# Patient Record
Sex: FEMALE | Race: WHITE | Employment: UNEMPLOYED
[De-identification: names, ages, dates, MRNs, and addresses within clinical notes are randomized per-mention and may not be internally consistent; named-entity substitution may affect disease eponyms.]

---

## 2017-01-11 ENCOUNTER — TELEPHONE (OUTPATIENT)
Dept: FAMILY MEDICINE CLINIC | Facility: CLINIC | Age: 57
End: 2017-01-11

## 2017-01-11 DIAGNOSIS — R74.01 ELEVATED TRANSAMINASE LEVEL: ICD-10-CM

## 2017-01-11 DIAGNOSIS — E11.22 CONTROLLED TYPE 2 DIABETES MELLITUS WITH STAGE 3 CHRONIC KIDNEY DISEASE, WITHOUT LONG-TERM CURRENT USE OF INSULIN (HCC): Primary | ICD-10-CM

## 2017-01-11 DIAGNOSIS — N18.30 CONTROLLED TYPE 2 DIABETES MELLITUS WITH STAGE 3 CHRONIC KIDNEY DISEASE, WITHOUT LONG-TERM CURRENT USE OF INSULIN (HCC): Primary | ICD-10-CM

## 2017-01-23 ENCOUNTER — OFFICE VISIT (OUTPATIENT)
Dept: FAMILY MEDICINE CLINIC | Facility: CLINIC | Age: 57
End: 2017-01-23

## 2017-01-23 VITALS
DIASTOLIC BLOOD PRESSURE: 82 MMHG | WEIGHT: 186 LBS | RESPIRATION RATE: 20 BRPM | BODY MASS INDEX: 32.96 KG/M2 | HEART RATE: 68 BPM | HEIGHT: 63 IN | SYSTOLIC BLOOD PRESSURE: 130 MMHG | TEMPERATURE: 97.6 F

## 2017-01-23 DIAGNOSIS — G62.9 PERIPHERAL POLYNEUROPATHY: ICD-10-CM

## 2017-01-23 DIAGNOSIS — I10 ESSENTIAL HYPERTENSION: ICD-10-CM

## 2017-01-23 DIAGNOSIS — E78.00 PURE HYPERCHOLESTEROLEMIA: ICD-10-CM

## 2017-01-23 DIAGNOSIS — E11.22 CONTROLLED TYPE 2 DIABETES MELLITUS WITH STAGE 3 CHRONIC KIDNEY DISEASE, WITHOUT LONG-TERM CURRENT USE OF INSULIN (HCC): Primary | ICD-10-CM

## 2017-01-23 DIAGNOSIS — F33.1 MODERATE EPISODE OF RECURRENT MAJOR DEPRESSIVE DISORDER (HCC): ICD-10-CM

## 2017-01-23 DIAGNOSIS — Z23 NEED FOR INFLUENZA VACCINATION: ICD-10-CM

## 2017-01-23 DIAGNOSIS — Z11.59 NEED FOR HEPATITIS C SCREENING TEST: ICD-10-CM

## 2017-01-23 DIAGNOSIS — N18.30 CKD (CHRONIC KIDNEY DISEASE) STAGE 3, GFR 30-59 ML/MIN (HCC): ICD-10-CM

## 2017-01-23 DIAGNOSIS — N18.30 CONTROLLED TYPE 2 DIABETES MELLITUS WITH STAGE 3 CHRONIC KIDNEY DISEASE, WITHOUT LONG-TERM CURRENT USE OF INSULIN (HCC): Primary | ICD-10-CM

## 2017-01-23 PROCEDURE — G8427 DOCREV CUR MEDS BY ELIG CLIN: HCPCS | Performed by: PEDIATRICS

## 2017-01-23 PROCEDURE — 3017F COLORECTAL CA SCREEN DOC REV: CPT | Performed by: PEDIATRICS

## 2017-01-23 PROCEDURE — 99214 OFFICE O/P EST MOD 30 MIN: CPT | Performed by: PEDIATRICS

## 2017-01-23 PROCEDURE — 90688 IIV4 VACCINE SPLT 0.5 ML IM: CPT | Performed by: PEDIATRICS

## 2017-01-23 PROCEDURE — 3014F SCREEN MAMMO DOC REV: CPT | Performed by: PEDIATRICS

## 2017-01-23 PROCEDURE — G8419 CALC BMI OUT NRM PARAM NOF/U: HCPCS | Performed by: PEDIATRICS

## 2017-01-23 PROCEDURE — 1036F TOBACCO NON-USER: CPT | Performed by: PEDIATRICS

## 2017-01-23 PROCEDURE — G8484 FLU IMMUNIZE NO ADMIN: HCPCS | Performed by: PEDIATRICS

## 2017-01-23 PROCEDURE — 90471 IMMUNIZATION ADMIN: CPT | Performed by: PEDIATRICS

## 2017-01-23 PROCEDURE — 3044F HG A1C LEVEL LT 7.0%: CPT | Performed by: PEDIATRICS

## 2017-01-23 ASSESSMENT — ENCOUNTER SYMPTOMS
EYES NEGATIVE: 1
BLURRED VISION: 0
VISUAL CHANGE: 0
CONSTIPATION: 0
BACK PAIN: 1
COUGH: 0
DIARRHEA: 0
WHEEZING: 0
SHORTNESS OF BREATH: 0
ORTHOPNEA: 0
BOWEL INCONTINENCE: 0

## 2017-03-08 ENCOUNTER — OFFICE VISIT (OUTPATIENT)
Dept: FAMILY MEDICINE CLINIC | Facility: CLINIC | Age: 57
End: 2017-03-08

## 2017-03-08 VITALS
TEMPERATURE: 100.9 F | HEART RATE: 74 BPM | DIASTOLIC BLOOD PRESSURE: 84 MMHG | RESPIRATION RATE: 17 BRPM | SYSTOLIC BLOOD PRESSURE: 110 MMHG | WEIGHT: 187.5 LBS | BODY MASS INDEX: 33.21 KG/M2

## 2017-03-08 DIAGNOSIS — R10.30 LOWER ABDOMINAL PAIN: Primary | ICD-10-CM

## 2017-03-08 DIAGNOSIS — R11.2 NON-INTRACTABLE VOMITING WITH NAUSEA, UNSPECIFIED VOMITING TYPE: ICD-10-CM

## 2017-03-08 DIAGNOSIS — R50.9 FEVER, UNSPECIFIED FEVER CAUSE: ICD-10-CM

## 2017-03-08 PROCEDURE — G8484 FLU IMMUNIZE NO ADMIN: HCPCS | Performed by: NURSE PRACTITIONER

## 2017-03-08 PROCEDURE — 3014F SCREEN MAMMO DOC REV: CPT | Performed by: NURSE PRACTITIONER

## 2017-03-08 PROCEDURE — 1036F TOBACCO NON-USER: CPT | Performed by: NURSE PRACTITIONER

## 2017-03-08 PROCEDURE — 99213 OFFICE O/P EST LOW 20 MIN: CPT | Performed by: NURSE PRACTITIONER

## 2017-03-08 PROCEDURE — 3017F COLORECTAL CA SCREEN DOC REV: CPT | Performed by: NURSE PRACTITIONER

## 2017-03-08 PROCEDURE — G8417 CALC BMI ABV UP PARAM F/U: HCPCS | Performed by: NURSE PRACTITIONER

## 2017-03-08 PROCEDURE — G8427 DOCREV CUR MEDS BY ELIG CLIN: HCPCS | Performed by: NURSE PRACTITIONER

## 2017-03-08 RX ORDER — ONDANSETRON 4 MG/1
4 TABLET, ORALLY DISINTEGRATING ORAL EVERY 8 HOURS PRN
Qty: 30 TABLET | Refills: 0 | Status: SHIPPED | OUTPATIENT
Start: 2017-03-08 | End: 2017-03-18

## 2017-03-08 ASSESSMENT — ENCOUNTER SYMPTOMS
SHORTNESS OF BREATH: 0
COUGH: 0
CONSTIPATION: 1
NAUSEA: 1
BELCHING: 0
DIARRHEA: 1
ABDOMINAL PAIN: 1
VOMITING: 1
RHINORRHEA: 0
SORE THROAT: 0

## 2017-03-09 ENCOUNTER — TELEPHONE (OUTPATIENT)
Dept: FAMILY MEDICINE CLINIC | Facility: CLINIC | Age: 57
End: 2017-03-09

## 2017-03-10 RX ORDER — CIPROFLOXACIN 500 MG/1
500 TABLET, FILM COATED ORAL 2 TIMES DAILY
Qty: 20 TABLET | Refills: 0 | Status: SHIPPED | OUTPATIENT
Start: 2017-03-10 | End: 2017-03-20

## 2017-03-14 RX ORDER — DULOXETIN HYDROCHLORIDE 30 MG/1
30 CAPSULE, DELAYED RELEASE ORAL DAILY
Qty: 90 CAPSULE | Refills: 0 | Status: SHIPPED | OUTPATIENT
Start: 2017-03-14 | End: 2017-06-14 | Stop reason: SDUPTHER

## 2017-04-13 RX ORDER — FENOFIBRATE 150 MG/1
150 CAPSULE ORAL DAILY
Qty: 90 CAPSULE | Refills: 1 | Status: SHIPPED | OUTPATIENT
Start: 2017-04-13 | End: 2017-09-25 | Stop reason: SDUPTHER

## 2017-04-25 ENCOUNTER — HOSPITAL ENCOUNTER (OUTPATIENT)
Age: 57
Setting detail: SPECIMEN
Discharge: HOME | End: 2017-04-25
Payer: COMMERCIAL

## 2017-04-25 DIAGNOSIS — Z11.59 NEED FOR HEPATITIS C SCREENING TEST: ICD-10-CM

## 2017-04-25 DIAGNOSIS — E11.22 CONTROLLED TYPE 2 DIABETES MELLITUS WITH STAGE 3 CHRONIC KIDNEY DISEASE, WITHOUT LONG-TERM CURRENT USE OF INSULIN (HCC): ICD-10-CM

## 2017-04-25 DIAGNOSIS — N18.30 CONTROLLED TYPE 2 DIABETES MELLITUS WITH STAGE 3 CHRONIC KIDNEY DISEASE, WITHOUT LONG-TERM CURRENT USE OF INSULIN (HCC): ICD-10-CM

## 2017-04-25 DIAGNOSIS — R74.01 ELEVATED TRANSAMINASE LEVEL: ICD-10-CM

## 2017-04-25 LAB
ALT SERPL-CCNC: 46 U/L (ref 5–33)
AST SERPL-CCNC: 23 U/L
ESTIMATED AVERAGE GLUCOSE: 137 MG/DL
HBA1C MFR BLD: 6.4 % (ref 4–6)
HEPATITIS C ANTIBODY: NONREACTIVE

## 2017-07-18 RX ORDER — LISINOPRIL 40 MG/1
40 TABLET ORAL DAILY
Qty: 90 TABLET | Refills: 0 | Status: SHIPPED | OUTPATIENT
Start: 2017-07-18 | End: 2017-10-17 | Stop reason: SDUPTHER

## 2017-07-24 ENCOUNTER — OFFICE VISIT (OUTPATIENT)
Dept: FAMILY MEDICINE CLINIC | Age: 57
End: 2017-07-24
Payer: COMMERCIAL

## 2017-07-24 ENCOUNTER — HOSPITAL ENCOUNTER (OUTPATIENT)
Age: 57
Setting detail: SPECIMEN
Discharge: HOME | End: 2017-07-24
Payer: COMMERCIAL

## 2017-07-24 VITALS
TEMPERATURE: 97.1 F | DIASTOLIC BLOOD PRESSURE: 86 MMHG | OXYGEN SATURATION: 95 % | HEART RATE: 70 BPM | SYSTOLIC BLOOD PRESSURE: 124 MMHG | RESPIRATION RATE: 20 BRPM | WEIGHT: 182 LBS | BODY MASS INDEX: 32.25 KG/M2 | HEIGHT: 63 IN

## 2017-07-24 DIAGNOSIS — E11.22 CONTROLLED TYPE 2 DIABETES MELLITUS WITH STAGE 3 CHRONIC KIDNEY DISEASE, WITHOUT LONG-TERM CURRENT USE OF INSULIN (HCC): Primary | ICD-10-CM

## 2017-07-24 DIAGNOSIS — Z13.29 SCREENING FOR THYROID DISORDER: ICD-10-CM

## 2017-07-24 DIAGNOSIS — M51.16 LUMBAR DISC HERNIATION WITH RADICULOPATHY: ICD-10-CM

## 2017-07-24 DIAGNOSIS — E78.00 PURE HYPERCHOLESTEROLEMIA: ICD-10-CM

## 2017-07-24 DIAGNOSIS — G47.33 OSA (OBSTRUCTIVE SLEEP APNEA): ICD-10-CM

## 2017-07-24 DIAGNOSIS — I10 ESSENTIAL HYPERTENSION: ICD-10-CM

## 2017-07-24 DIAGNOSIS — E11.22 CONTROLLED TYPE 2 DIABETES MELLITUS WITH STAGE 3 CHRONIC KIDNEY DISEASE, WITHOUT LONG-TERM CURRENT USE OF INSULIN (HCC): ICD-10-CM

## 2017-07-24 DIAGNOSIS — L23.9 ALLERGIC CONTACT DERMATITIS, UNSPECIFIED TRIGGER: ICD-10-CM

## 2017-07-24 DIAGNOSIS — F33.1 MODERATE EPISODE OF RECURRENT MAJOR DEPRESSIVE DISORDER (HCC): ICD-10-CM

## 2017-07-24 DIAGNOSIS — N18.30 CONTROLLED TYPE 2 DIABETES MELLITUS WITH STAGE 3 CHRONIC KIDNEY DISEASE, WITHOUT LONG-TERM CURRENT USE OF INSULIN (HCC): ICD-10-CM

## 2017-07-24 DIAGNOSIS — N18.30 CONTROLLED TYPE 2 DIABETES MELLITUS WITH STAGE 3 CHRONIC KIDNEY DISEASE, WITHOUT LONG-TERM CURRENT USE OF INSULIN (HCC): Primary | ICD-10-CM

## 2017-07-24 LAB
ABSOLUTE EOS #: 0.1 K/UL (ref 0–0.4)
ABSOLUTE LYMPH #: 2.5 K/UL (ref 1–4.8)
ABSOLUTE MONO #: 0.4 K/UL (ref 0.1–1.2)
ALBUMIN SERPL-MCNC: 4.6 G/DL (ref 3.5–5.2)
ALBUMIN/GLOBULIN RATIO: 2.1 (ref 1–2.5)
ALP BLD-CCNC: 94 U/L (ref 35–104)
ALT SERPL-CCNC: 35 U/L (ref 5–33)
ANION GAP SERPL CALCULATED.3IONS-SCNC: 19 MMOL/L (ref 9–17)
AST SERPL-CCNC: 23 U/L
BASOPHILS # BLD: 0 %
BASOPHILS ABSOLUTE: 0 K/UL (ref 0–0.2)
BILIRUB SERPL-MCNC: 0.52 MG/DL (ref 0.3–1.2)
BUN BLDV-MCNC: 22 MG/DL (ref 6–20)
BUN/CREAT BLD: ABNORMAL (ref 9–20)
CALCIUM SERPL-MCNC: 9.9 MG/DL (ref 8.6–10.4)
CHLORIDE BLD-SCNC: 104 MMOL/L (ref 98–107)
CHOLESTEROL/HDL RATIO: 4.3
CHOLESTEROL: 192 MG/DL
CO2: 26 MMOL/L (ref 20–31)
CREAT SERPL-MCNC: 0.89 MG/DL (ref 0.5–0.9)
DIFFERENTIAL TYPE: NORMAL
EOSINOPHILS RELATIVE PERCENT: 2 %
ESTIMATED AVERAGE GLUCOSE: 140 MG/DL
GFR AFRICAN AMERICAN: >60 ML/MIN
GFR NON-AFRICAN AMERICAN: >60 ML/MIN
GFR SERPL CREATININE-BSD FRML MDRD: ABNORMAL ML/MIN/{1.73_M2}
GFR SERPL CREATININE-BSD FRML MDRD: ABNORMAL ML/MIN/{1.73_M2}
GLUCOSE BLD-MCNC: 126 MG/DL (ref 70–99)
HBA1C MFR BLD: 6.5 % (ref 4–6)
HCT VFR BLD CALC: 41.2 % (ref 36–46)
HDLC SERPL-MCNC: 45 MG/DL
HEMOGLOBIN: 13.9 G/DL (ref 12–16)
LDL CHOLESTEROL: 106 MG/DL (ref 0–130)
LYMPHOCYTES # BLD: 31 %
MCH RBC QN AUTO: 29.1 PG (ref 26–34)
MCHC RBC AUTO-ENTMCNC: 33.8 G/DL (ref 31–37)
MCV RBC AUTO: 86.2 FL (ref 80–100)
MONOCYTES # BLD: 5 %
PDW BLD-RTO: 13.7 % (ref 12.5–15.4)
PLATELET # BLD: 284 K/UL (ref 140–450)
PLATELET ESTIMATE: NORMAL
PMV BLD AUTO: 9.6 FL (ref 6–12)
POTASSIUM SERPL-SCNC: 4.7 MMOL/L (ref 3.7–5.3)
RBC # BLD: 4.79 M/UL (ref 4–5.2)
RBC # BLD: NORMAL 10*6/UL
SEG NEUTROPHILS: 62 %
SEGMENTED NEUTROPHILS ABSOLUTE COUNT: 5 K/UL (ref 1.8–7.7)
SODIUM BLD-SCNC: 149 MMOL/L (ref 135–144)
TOTAL PROTEIN: 6.8 G/DL (ref 6.4–8.3)
TRIGL SERPL-MCNC: 203 MG/DL
TSH SERPL DL<=0.05 MIU/L-ACNC: 2.04 MIU/L (ref 0.3–5)
VLDLC SERPL CALC-MCNC: ABNORMAL MG/DL (ref 1–30)
WBC # BLD: 8.2 K/UL (ref 3.5–11)
WBC # BLD: NORMAL 10*3/UL

## 2017-07-24 PROCEDURE — 3046F HEMOGLOBIN A1C LEVEL >9.0%: CPT | Performed by: NURSE PRACTITIONER

## 2017-07-24 PROCEDURE — G8417 CALC BMI ABV UP PARAM F/U: HCPCS | Performed by: NURSE PRACTITIONER

## 2017-07-24 PROCEDURE — 99214 OFFICE O/P EST MOD 30 MIN: CPT | Performed by: NURSE PRACTITIONER

## 2017-07-24 PROCEDURE — 3017F COLORECTAL CA SCREEN DOC REV: CPT | Performed by: NURSE PRACTITIONER

## 2017-07-24 PROCEDURE — G8427 DOCREV CUR MEDS BY ELIG CLIN: HCPCS | Performed by: NURSE PRACTITIONER

## 2017-07-24 PROCEDURE — 1036F TOBACCO NON-USER: CPT | Performed by: NURSE PRACTITIONER

## 2017-07-24 PROCEDURE — 3014F SCREEN MAMMO DOC REV: CPT | Performed by: NURSE PRACTITIONER

## 2017-07-24 RX ORDER — LORATADINE 10 MG/1
10 CAPSULE, LIQUID FILLED ORAL DAILY
COMMUNITY
End: 2017-07-24

## 2017-07-24 RX ORDER — CETIRIZINE HYDROCHLORIDE 10 MG/1
10 TABLET ORAL DAILY
COMMUNITY
Start: 2017-07-24 | End: 2018-07-24

## 2017-07-24 ASSESSMENT — ENCOUNTER SYMPTOMS
SHORTNESS OF BREATH: 0
VOMITING: 0
NAUSEA: 1
ABDOMINAL PAIN: 0
RHINORRHEA: 0
BACK PAIN: 1
EYE ITCHING: 1
WHEEZING: 0
CONSTIPATION: 0
DIARRHEA: 0
EYE PAIN: 0
TROUBLE SWALLOWING: 0
COUGH: 0
SORE THROAT: 0
EYE DISCHARGE: 1

## 2017-07-26 ENCOUNTER — TELEPHONE (OUTPATIENT)
Dept: FAMILY MEDICINE CLINIC | Age: 57
End: 2017-07-26

## 2017-07-26 RX ORDER — PREDNISONE 20 MG/1
40 TABLET ORAL DAILY
Qty: 10 TABLET | Refills: 0 | Status: SHIPPED | OUTPATIENT
Start: 2017-07-26 | End: 2017-07-31

## 2017-09-13 RX ORDER — DULOXETIN HYDROCHLORIDE 30 MG/1
30 CAPSULE, DELAYED RELEASE ORAL DAILY
Qty: 90 CAPSULE | Refills: 1 | Status: SHIPPED | OUTPATIENT
Start: 2017-09-13 | End: 2018-03-12 | Stop reason: SDUPTHER

## 2017-09-25 RX ORDER — FENOFIBRATE 150 MG/1
150 CAPSULE ORAL DAILY
Qty: 90 CAPSULE | Refills: 1 | Status: SHIPPED | OUTPATIENT
Start: 2017-09-25 | End: 2018-04-26 | Stop reason: ALTCHOICE

## 2017-10-19 RX ORDER — LISINOPRIL 40 MG/1
40 TABLET ORAL DAILY
Qty: 90 TABLET | Refills: 1 | Status: SHIPPED | OUTPATIENT
Start: 2017-10-19 | End: 2018-04-19 | Stop reason: SDUPTHER

## 2017-11-07 RX ORDER — OMEPRAZOLE 20 MG/1
20 CAPSULE, DELAYED RELEASE ORAL DAILY
Qty: 90 CAPSULE | Refills: 2 | Status: SHIPPED | OUTPATIENT
Start: 2017-11-07 | End: 2018-08-06 | Stop reason: SDUPTHER

## 2018-01-08 DIAGNOSIS — E78.00 PURE HYPERCHOLESTEROLEMIA: ICD-10-CM

## 2018-01-09 RX ORDER — ATORVASTATIN CALCIUM 40 MG/1
40 TABLET, FILM COATED ORAL DAILY
Qty: 90 TABLET | Refills: 2 | Status: SHIPPED | OUTPATIENT
Start: 2018-01-09 | End: 2018-04-26 | Stop reason: SDUPTHER

## 2018-03-13 RX ORDER — DULOXETIN HYDROCHLORIDE 30 MG/1
30 CAPSULE, DELAYED RELEASE ORAL DAILY
Qty: 90 CAPSULE | Refills: 1 | Status: SHIPPED | OUTPATIENT
Start: 2018-03-13 | End: 2018-05-22 | Stop reason: DRUGHIGH

## 2018-04-20 RX ORDER — LISINOPRIL 40 MG/1
40 TABLET ORAL DAILY
Qty: 90 TABLET | Refills: 0 | Status: SHIPPED | OUTPATIENT
Start: 2018-04-20 | End: 2018-07-22 | Stop reason: SDUPTHER

## 2018-04-23 ENCOUNTER — OFFICE VISIT (OUTPATIENT)
Dept: FAMILY MEDICINE CLINIC | Age: 58
End: 2018-04-23
Payer: COMMERCIAL

## 2018-04-23 VITALS
HEART RATE: 68 BPM | BODY MASS INDEX: 32.85 KG/M2 | RESPIRATION RATE: 16 BRPM | SYSTOLIC BLOOD PRESSURE: 128 MMHG | OXYGEN SATURATION: 98 % | WEIGHT: 185.4 LBS | DIASTOLIC BLOOD PRESSURE: 78 MMHG | HEIGHT: 63 IN

## 2018-04-23 DIAGNOSIS — E11.22 CONTROLLED TYPE 2 DIABETES MELLITUS WITH STAGE 3 CHRONIC KIDNEY DISEASE, WITHOUT LONG-TERM CURRENT USE OF INSULIN (HCC): Primary | ICD-10-CM

## 2018-04-23 DIAGNOSIS — F33.1 MODERATE EPISODE OF RECURRENT MAJOR DEPRESSIVE DISORDER (HCC): ICD-10-CM

## 2018-04-23 DIAGNOSIS — I10 ESSENTIAL HYPERTENSION: ICD-10-CM

## 2018-04-23 DIAGNOSIS — N18.30 CONTROLLED TYPE 2 DIABETES MELLITUS WITH STAGE 3 CHRONIC KIDNEY DISEASE, WITHOUT LONG-TERM CURRENT USE OF INSULIN (HCC): Primary | ICD-10-CM

## 2018-04-23 DIAGNOSIS — Z13.31 POSITIVE DEPRESSION SCREENING: ICD-10-CM

## 2018-04-23 DIAGNOSIS — E78.00 PURE HYPERCHOLESTEROLEMIA: ICD-10-CM

## 2018-04-23 DIAGNOSIS — G62.9 PERIPHERAL POLYNEUROPATHY: ICD-10-CM

## 2018-04-23 DIAGNOSIS — M51.16 LUMBAR DISC HERNIATION WITH RADICULOPATHY: ICD-10-CM

## 2018-04-23 PROCEDURE — G8417 CALC BMI ABV UP PARAM F/U: HCPCS | Performed by: PEDIATRICS

## 2018-04-23 PROCEDURE — G0444 DEPRESSION SCREEN ANNUAL: HCPCS | Performed by: PEDIATRICS

## 2018-04-23 PROCEDURE — G8431 POS CLIN DEPRES SCRN F/U DOC: HCPCS | Performed by: PEDIATRICS

## 2018-04-23 PROCEDURE — 3017F COLORECTAL CA SCREEN DOC REV: CPT | Performed by: PEDIATRICS

## 2018-04-23 PROCEDURE — 3046F HEMOGLOBIN A1C LEVEL >9.0%: CPT | Performed by: PEDIATRICS

## 2018-04-23 PROCEDURE — 2022F DILAT RTA XM EVC RTNOPTHY: CPT | Performed by: PEDIATRICS

## 2018-04-23 PROCEDURE — 99214 OFFICE O/P EST MOD 30 MIN: CPT | Performed by: PEDIATRICS

## 2018-04-23 PROCEDURE — 3014F SCREEN MAMMO DOC REV: CPT | Performed by: PEDIATRICS

## 2018-04-23 PROCEDURE — G8427 DOCREV CUR MEDS BY ELIG CLIN: HCPCS | Performed by: PEDIATRICS

## 2018-04-23 PROCEDURE — 1036F TOBACCO NON-USER: CPT | Performed by: PEDIATRICS

## 2018-04-23 RX ORDER — NAPROXEN 500 MG/1
1 TABLET ORAL DAILY PRN
COMMUNITY

## 2018-04-23 ASSESSMENT — PATIENT HEALTH QUESTIONNAIRE - PHQ9
SUM OF ALL RESPONSES TO PHQ QUESTIONS 1-9: 12
7. TROUBLE CONCENTRATING ON THINGS, SUCH AS READING THE NEWSPAPER OR WATCHING TELEVISION: 0
1. LITTLE INTEREST OR PLEASURE IN DOING THINGS: 3
5. POOR APPETITE OR OVEREATING: 3
4. FEELING TIRED OR HAVING LITTLE ENERGY: 3
3. TROUBLE FALLING OR STAYING ASLEEP: 0
2. FEELING DOWN, DEPRESSED OR HOPELESS: 3
9. THOUGHTS THAT YOU WOULD BE BETTER OFF DEAD, OR OF HURTING YOURSELF: 0
10. IF YOU CHECKED OFF ANY PROBLEMS, HOW DIFFICULT HAVE THESE PROBLEMS MADE IT FOR YOU TO DO YOUR WORK, TAKE CARE OF THINGS AT HOME, OR GET ALONG WITH OTHER PEOPLE: 2
8. MOVING OR SPEAKING SO SLOWLY THAT OTHER PEOPLE COULD HAVE NOTICED. OR THE OPPOSITE, BEING SO FIGETY OR RESTLESS THAT YOU HAVE BEEN MOVING AROUND A LOT MORE THAN USUAL: 0
SUM OF ALL RESPONSES TO PHQ9 QUESTIONS 1 & 2: 6
6. FEELING BAD ABOUT YOURSELF - OR THAT YOU ARE A FAILURE OR HAVE LET YOURSELF OR YOUR FAMILY DOWN: 0

## 2018-04-23 ASSESSMENT — ENCOUNTER SYMPTOMS
GASTROINTESTINAL NEGATIVE: 1
BACK PAIN: 1
EYES NEGATIVE: 1
RESPIRATORY NEGATIVE: 1

## 2018-04-24 ENCOUNTER — HOSPITAL ENCOUNTER (OUTPATIENT)
Age: 58
Setting detail: SPECIMEN
Discharge: HOME | End: 2018-04-24
Payer: COMMERCIAL

## 2018-04-24 DIAGNOSIS — E78.00 PURE HYPERCHOLESTEROLEMIA: ICD-10-CM

## 2018-04-24 DIAGNOSIS — N18.30 CONTROLLED TYPE 2 DIABETES MELLITUS WITH STAGE 3 CHRONIC KIDNEY DISEASE, WITHOUT LONG-TERM CURRENT USE OF INSULIN (HCC): ICD-10-CM

## 2018-04-24 DIAGNOSIS — I10 ESSENTIAL HYPERTENSION: ICD-10-CM

## 2018-04-24 DIAGNOSIS — E11.22 CONTROLLED TYPE 2 DIABETES MELLITUS WITH STAGE 3 CHRONIC KIDNEY DISEASE, WITHOUT LONG-TERM CURRENT USE OF INSULIN (HCC): ICD-10-CM

## 2018-04-24 LAB
ALBUMIN SERPL-MCNC: 4.4 G/DL (ref 3.5–5.2)
ALBUMIN/GLOBULIN RATIO: 1.9 (ref 1–2.5)
ALP BLD-CCNC: 93 U/L (ref 35–104)
ALT SERPL-CCNC: 33 U/L (ref 5–33)
ANION GAP SERPL CALCULATED.3IONS-SCNC: 15 MMOL/L (ref 9–17)
AST SERPL-CCNC: 21 U/L
BILIRUB SERPL-MCNC: 0.5 MG/DL (ref 0.3–1.2)
BUN BLDV-MCNC: 14 MG/DL (ref 6–20)
BUN/CREAT BLD: ABNORMAL (ref 9–20)
CALCIUM SERPL-MCNC: 9.4 MG/DL (ref 8.6–10.4)
CHLORIDE BLD-SCNC: 103 MMOL/L (ref 98–107)
CHOLESTEROL/HDL RATIO: 3.5
CHOLESTEROL: 189 MG/DL
CO2: 24 MMOL/L (ref 20–31)
CREAT SERPL-MCNC: 0.89 MG/DL (ref 0.5–0.9)
CREATININE URINE: 276.7 MG/DL (ref 28–217)
GFR AFRICAN AMERICAN: >60 ML/MIN
GFR NON-AFRICAN AMERICAN: >60 ML/MIN
GFR SERPL CREATININE-BSD FRML MDRD: ABNORMAL ML/MIN/{1.73_M2}
GFR SERPL CREATININE-BSD FRML MDRD: ABNORMAL ML/MIN/{1.73_M2}
GLUCOSE BLD-MCNC: 127 MG/DL (ref 70–99)
HDLC SERPL-MCNC: 54 MG/DL
LDL CHOLESTEROL: 116 MG/DL (ref 0–130)
MICROALBUMIN/CREAT 24H UR: 15 MG/L
MICROALBUMIN/CREAT UR-RTO: 5 MCG/MG CREAT
POTASSIUM SERPL-SCNC: 4.1 MMOL/L (ref 3.7–5.3)
SODIUM BLD-SCNC: 142 MMOL/L (ref 135–144)
TOTAL PROTEIN: 6.7 G/DL (ref 6.4–8.3)
TRIGL SERPL-MCNC: 96 MG/DL
VLDLC SERPL CALC-MCNC: NORMAL MG/DL (ref 1–30)

## 2018-04-25 LAB
ESTIMATED AVERAGE GLUCOSE: 134 MG/DL
HBA1C MFR BLD: 6.3 % (ref 4–6)

## 2018-04-26 DIAGNOSIS — E78.00 PURE HYPERCHOLESTEROLEMIA: Primary | ICD-10-CM

## 2018-04-26 DIAGNOSIS — N18.30 CONTROLLED TYPE 2 DIABETES MELLITUS WITH STAGE 3 CHRONIC KIDNEY DISEASE, WITHOUT LONG-TERM CURRENT USE OF INSULIN (HCC): ICD-10-CM

## 2018-04-26 DIAGNOSIS — I10 ESSENTIAL HYPERTENSION: ICD-10-CM

## 2018-04-26 DIAGNOSIS — E78.00 PURE HYPERCHOLESTEROLEMIA: ICD-10-CM

## 2018-04-26 DIAGNOSIS — E11.22 CONTROLLED TYPE 2 DIABETES MELLITUS WITH STAGE 3 CHRONIC KIDNEY DISEASE, WITHOUT LONG-TERM CURRENT USE OF INSULIN (HCC): ICD-10-CM

## 2018-04-27 RX ORDER — ATORVASTATIN CALCIUM 80 MG/1
80 TABLET, FILM COATED ORAL DAILY
Qty: 90 TABLET | Refills: 1 | Status: SHIPPED | OUTPATIENT
Start: 2018-04-27 | End: 2020-01-08 | Stop reason: SDUPTHER

## 2018-05-03 ENCOUNTER — TELEPHONE (OUTPATIENT)
Dept: PSYCHOLOGY | Facility: CLINIC | Age: 58
End: 2018-05-03

## 2018-05-22 ENCOUNTER — TELEPHONE (OUTPATIENT)
Dept: FAMILY MEDICINE CLINIC | Age: 58
End: 2018-05-22

## 2018-05-22 RX ORDER — DULOXETIN HYDROCHLORIDE 30 MG/1
60 CAPSULE, DELAYED RELEASE ORAL DAILY
Qty: 180 CAPSULE | Refills: 1 | Status: CANCELLED | OUTPATIENT
Start: 2018-05-22 | End: 2019-05-22

## 2018-05-22 RX ORDER — DULOXETIN HYDROCHLORIDE 60 MG/1
60 CAPSULE, DELAYED RELEASE ORAL DAILY
Qty: 90 CAPSULE | Refills: 1 | Status: SHIPPED | OUTPATIENT
Start: 2018-05-22 | End: 2018-10-30 | Stop reason: SDUPTHER

## 2018-07-23 RX ORDER — LISINOPRIL 40 MG/1
40 TABLET ORAL DAILY
Qty: 90 TABLET | Refills: 1 | Status: SHIPPED | OUTPATIENT
Start: 2018-07-23 | End: 2019-01-19 | Stop reason: SDUPTHER

## 2018-07-23 NOTE — TELEPHONE ENCOUNTER
LR 4/20/18  LOV 4/23/18  RTO 6 months    Health Maintenance   Topic Date Due    HIV screen  06/25/1973    Shingles Vaccine (1 of 2 - 2 Dose Series) 06/25/2008    Flu vaccine (1) 09/01/2018    Diabetic retinal exam  04/09/2019    Diabetic foot exam  04/23/2019    A1C test (Diabetic or Prediabetic)  04/24/2019    Lipid screen  04/24/2019    Potassium monitoring  04/24/2019    Creatinine monitoring  04/24/2019    Breast cancer screen  07/10/2019    DTaP/Tdap/Td vaccine (2 - Td) 09/24/2020    Colon cancer screen colonoscopy  02/27/2023    Pneumococcal med risk  Completed    Hepatitis C screen  Completed             (applicable per patient's age: Cancer Screenings, Depression Screening, Fall Risk Screening, Immunizations)    Hemoglobin A1C (%)   Date Value   04/24/2018 6.3 (H)   07/24/2017 6.5 (H)   04/25/2017 6.4 (H)     Microalb/Crt.  Ratio (mcg/mg creat)   Date Value   04/24/2018 5     LDL Cholesterol (mg/dL)   Date Value   04/24/2018 116     AST (U/L)   Date Value   04/24/2018 21     ALT (U/L)   Date Value   04/24/2018 33     BUN (mg/dL)   Date Value   04/24/2018 14      (goal A1C is < 7)   (goal LDL is <100) need 30-50% reduction from baseline     BP Readings from Last 3 Encounters:   04/23/18 128/78   07/24/17 124/86   03/08/17 110/84    (goal /80)      All Future Testing planned in [facility]:  Lab Frequency Next Occurrence   Hemoglobin A1C Once 10/22/2018   Comprehensive Metabolic Panel Once 92/27/2635   Lipid Panel Once 10/22/2018       Next Visit Date:  Future Appointments  Date Time Provider Jazmin Chan   10/30/2018 8:40 AM [facility]            Patient Active Problem List:     Diabetes mellitus 2 with renal manifestations, controlled     Lumbar disc herniation with radiculopathy     Hyperlipidemia     Hypertension     Major depression     CKD (chronic kidney disease) stage 3, GFR 30-59 ml/min     Peripheral neuropathy (HCC)     ARNIE (obstructive sleep apnea)

## 2018-08-06 NOTE — TELEPHONE ENCOUNTER
LOV:4-23-18  HCV:77-96-00  YCD:70-3-18  Health Maintenance   Topic Date Due    HIV screen  06/25/1973    Shingles Vaccine (1 of 2 - 2 Dose Series) 06/25/2008    Flu vaccine (1) 09/01/2018    Diabetic retinal exam  04/09/2019    Diabetic foot exam  04/23/2019    A1C test (Diabetic or Prediabetic)  04/24/2019    Lipid screen  04/24/2019    Potassium monitoring  04/24/2019    Creatinine monitoring  04/24/2019    Breast cancer screen  07/10/2019    DTaP/Tdap/Td vaccine (2 - Td) 09/24/2020    Colon cancer screen colonoscopy  02/27/2023    Pneumococcal med risk  Completed    Hepatitis C screen  Completed             (applicable per patient's age: Cancer Screenings, Depression Screening, Fall Risk Screening, Immunizations)    Hemoglobin A1C (%)   Date Value   04/24/2018 6.3 (H)   07/24/2017 6.5 (H)   04/25/2017 6.4 (H)     Microalb/Crt.  Ratio (mcg/mg creat)   Date Value   04/24/2018 5     LDL Cholesterol (mg/dL)   Date Value   04/24/2018 116     AST (U/L)   Date Value   04/24/2018 21     ALT (U/L)   Date Value   04/24/2018 33     BUN (mg/dL)   Date Value   04/24/2018 14      (goal A1C is < 7)   (goal LDL is <100) need 30-50% reduction from baseline     BP Readings from Last 3 Encounters:   04/23/18 128/78   07/24/17 124/86   03/08/17 110/84    (goal /80)      All Future Testing planned in [facility]:  Lab Frequency Next Occurrence   Hemoglobin A1C Once 10/22/2018   Comprehensive Metabolic Panel Once 14/75/4202   Lipid Panel Once 10/22/2018       Next Visit Date:  Future Appointments  Date Time Provider Jazmin Chan   10/30/2018 8:40 AM MD Glenna Hamlin Vern [facility] [address] [facility]            Patient Active Problem List:     Diabetes mellitus 2 with renal manifestations, controlled     Lumbar disc herniation with radiculopathy     Hyperlipidemia     Hypertension     Major depression     CKD (chronic kidney disease) stage 3, GFR 30-59 ml/min     Peripheral neuropathy     ARNIE (obstructive sleep apnea)     TMJ

## 2018-08-07 RX ORDER — OMEPRAZOLE 20 MG/1
20 CAPSULE, DELAYED RELEASE ORAL DAILY
Qty: 90 CAPSULE | Refills: 1 | Status: SHIPPED | OUTPATIENT
Start: 2018-08-07 | End: 2019-02-03 | Stop reason: SDUPTHER

## 2018-08-13 LAB
BASOPHILS ABSOLUTE: 0.1 /ΜL
BASOPHILS RELATIVE PERCENT: 1 %
BILIRUBIN, URINE: NEGATIVE
BLOOD, URINE: NEGATIVE
CLARITY: CLEAR
COLOR: YELLOW
EOSINOPHILS ABSOLUTE: 0.1 /ΜL
EOSINOPHILS RELATIVE PERCENT: 1 %
GLUCOSE URINE: NEGATIVE
HCT VFR BLD CALC: 39.1 % (ref 36–46)
HEMOGLOBIN: 13.5 G/DL (ref 12–16)
KETONES, URINE: NEGATIVE
LEUKOCYTE ESTERASE, URINE: POSITIVE
LYMPHOCYTES ABSOLUTE: 3 /ΜL
LYMPHOCYTES RELATIVE PERCENT: 37 %
MCH RBC QN AUTO: 30 PG
MCHC RBC AUTO-ENTMCNC: 34.6 G/DL
MCV RBC AUTO: 87 FL
MONOCYTES ABSOLUTE: 0.5 /ΜL
MONOCYTES RELATIVE PERCENT: 6 %
NEUTROPHILS ABSOLUTE: 4.4 /ΜL
NEUTROPHILS RELATIVE PERCENT: 54 %
NITRITE, URINE: NEGATIVE
PH UA: 7.5 (ref 4.5–8)
PLATELET # BLD: 284 K/ΜL
PMV BLD AUTO: 8.8 FL
PROTEIN UA: POSITIVE
RBC # BLD: 4.51 10^6/ΜL
SPECIFIC GRAVITY, URINE: 1.01
UROBILINOGEN, URINE: NORMAL
WBC # BLD: 8.2 10^3/ML

## 2018-08-17 ENCOUNTER — OFFICE VISIT (OUTPATIENT)
Dept: FAMILY MEDICINE CLINIC | Age: 58
End: 2018-08-17
Payer: COMMERCIAL

## 2018-08-17 VITALS
BODY MASS INDEX: 32.5 KG/M2 | RESPIRATION RATE: 18 BRPM | HEART RATE: 72 BPM | DIASTOLIC BLOOD PRESSURE: 88 MMHG | WEIGHT: 183.4 LBS | TEMPERATURE: 96.2 F | SYSTOLIC BLOOD PRESSURE: 134 MMHG

## 2018-08-17 DIAGNOSIS — M54.42 CHRONIC LEFT-SIDED LOW BACK PAIN WITH LEFT-SIDED SCIATICA: ICD-10-CM

## 2018-08-17 DIAGNOSIS — N39.0 URINARY TRACT INFECTION WITHOUT HEMATURIA, SITE UNSPECIFIED: ICD-10-CM

## 2018-08-17 DIAGNOSIS — G89.29 CHRONIC LEFT-SIDED LOW BACK PAIN WITH LEFT-SIDED SCIATICA: ICD-10-CM

## 2018-08-17 DIAGNOSIS — M51.16 LUMBAR DISC HERNIATION WITH RADICULOPATHY: Primary | ICD-10-CM

## 2018-08-17 PROCEDURE — 3014F SCREEN MAMMO DOC REV: CPT | Performed by: NURSE PRACTITIONER

## 2018-08-17 PROCEDURE — 1036F TOBACCO NON-USER: CPT | Performed by: NURSE PRACTITIONER

## 2018-08-17 PROCEDURE — 99213 OFFICE O/P EST LOW 20 MIN: CPT | Performed by: NURSE PRACTITIONER

## 2018-08-17 PROCEDURE — 3017F COLORECTAL CA SCREEN DOC REV: CPT | Performed by: NURSE PRACTITIONER

## 2018-08-17 PROCEDURE — G8417 CALC BMI ABV UP PARAM F/U: HCPCS | Performed by: NURSE PRACTITIONER

## 2018-08-17 PROCEDURE — G8427 DOCREV CUR MEDS BY ELIG CLIN: HCPCS | Performed by: NURSE PRACTITIONER

## 2018-08-17 RX ORDER — METHYLPREDNISOLONE 4 MG/1
TABLET ORAL
COMMUNITY
Start: 2018-08-13 | End: 2018-08-24

## 2018-08-17 RX ORDER — CIPROFLOXACIN 500 MG/1
1 TABLET, FILM COATED ORAL 2 TIMES DAILY
COMMUNITY
Start: 2018-08-15 | End: 2018-08-24

## 2018-08-17 RX ORDER — OXYCODONE HYDROCHLORIDE AND ACETAMINOPHEN 5; 325 MG/1; MG/1
TABLET ORAL
COMMUNITY
Start: 2018-08-13 | End: 2018-08-31

## 2018-08-17 RX ORDER — CYCLOBENZAPRINE HCL 10 MG
1 TABLET ORAL DAILY
COMMUNITY
Start: 2018-08-13 | End: 2018-09-07

## 2018-08-17 ASSESSMENT — ENCOUNTER SYMPTOMS
DIARRHEA: 0
COUGH: 0
ABDOMINAL PAIN: 0
VOMITING: 0
SHORTNESS OF BREATH: 0
BACK PAIN: 1
NAUSEA: 1

## 2018-08-17 NOTE — PROGRESS NOTES
methylPREDNISolone (MEDROL DOSEPACK) 4 MG tablet       oxyCODONE-acetaminophen (PERCOCET) 5-325 MG per tablet       omeprazole (PRILOSEC) 20 MG delayed release capsule Take 1 capsule by mouth Daily 90 capsule 1    lisinopril (PRINIVIL;ZESTRIL) 40 MG tablet Take 1 tablet by mouth daily 90 tablet 1    metFORMIN (GLUCOPHAGE) 500 MG tablet Take 1 tablet by mouth daily (with breakfast) 90 tablet 1    DULoxetine (CYMBALTA) 60 MG extended release capsule Take 1 capsule by mouth daily 90 capsule 1    atorvastatin (LIPITOR) 80 MG tablet Take 1 tablet by mouth daily New dose as discussed 90 tablet 1    DOCUSATE CALCIUM PO Take 1 capsule by mouth daily as needed      naproxen (NAPROSYN) 500 MG tablet Take 1 tablet by mouth daily as needed      aspirin 81 MG EC tablet Take 81 mg by mouth daily. No current facility-administered medications for this visit. Patient ID: Saad Bush is a 59 y.o. female. Patient presents in office today for ER follow up. Went to [facility] ER on Monday due to extreme back pains. Tells me she has troubles with her back and usually on her left side. Can go down her left leg. Only thing that was helping her pain was laying down. Taking Aleve as needed for pain. Prescribed medrol dose pack and muscle relaxers to use as needed. Daughter finally convinced her to go to ER. Discharged home. Dr. Karyle Lin called her next day and started her on Cipro 500 mg twice daily x7 days. Review of Systems   Constitutional: Positive for appetite change. Negative for fever. Respiratory: Negative for cough and shortness of breath. Cardiovascular: Negative for chest pain. Gastrointestinal: Positive for nausea (Sunday and Monday). Negative for abdominal pain, diarrhea and vomiting. Genitourinary: Positive for decreased urine volume, difficulty urinating, dysuria and urgency. Negative for hematuria. Musculoskeletal: Positive for arthralgias (left leg), back pain and gait problem. Has follow up with Dr. Sean Owen 9/12   Skin: Negative for rash. Neurological: Positive for numbness (left leg only) and headaches. Psychiatric/Behavioral: Positive for sleep disturbance. Objective:     /88 (Site: Right Arm, Position: Sitting, Cuff Size: Large Adult)   Pulse 72   Temp 96.2 °F (35.7 °C) (Tympanic)   Resp 18   Wt 183 lb 6.4 oz (83.2 kg)   BMI 32.50 kg/m²      Physical Exam   Constitutional: She is oriented to person, place, and time. She appears well-developed and well-nourished. No distress. Cardiovascular: Normal rate and regular rhythm. Pulmonary/Chest: Effort normal and breath sounds normal. No respiratory distress. She has no wheezes. Abdominal: Soft. She exhibits no distension. There is no tenderness. There is no rebound. Musculoskeletal: She exhibits no edema. Lumbar back: She exhibits decreased range of motion, tenderness and pain. She exhibits no swelling and no edema. Neurological: She is alert and oriented to person, place, and time. She has normal strength. No sensory deficit. Coordination and gait normal.   Skin: Skin is warm and dry. No rash noted. Psychiatric: She has a normal mood and affect. Nursing note and vitals reviewed. Assessment:      Diagnosis Orders   1. Lumbar disc herniation with radiculopathy     2. Chronic left-sided low back pain with left-sided sciatica     3. Urinary tract infection without hematuria, site unspecified         Plan:     Obtain and review ER chart  Obtain and review labs completed. Obtained urine culture results after her appt which was normal. Called patient and advised her ok to stop Cipro as she does not have UTI.    Follow up with neurosurgeon as planned  Continue medications as prescribed  Monitor for worsening symptoms  Limit heavy lifting  Call office with concerns        Nubia received counseling on the following healthy behaviors: nutrition, exercise and medication adherence  Reviewed prior labs and health maintenance. Continue current medications, diet and exercise. Discussed use, benefit, and side effects of prescribed medications. Barriers to medication compliance addressed. Patient given educational materials - see patient instructions. All patient questions answered. Patient voiced understanding.            Electronically signed by JORDAN Tidwell CNP on 8/17/2018 at 5:20 PM

## 2018-10-25 ENCOUNTER — HOSPITAL ENCOUNTER (OUTPATIENT)
Age: 58
Setting detail: SPECIMEN
Discharge: HOME | End: 2018-10-25
Payer: COMMERCIAL

## 2018-10-25 DIAGNOSIS — E78.00 PURE HYPERCHOLESTEROLEMIA: ICD-10-CM

## 2018-10-25 DIAGNOSIS — N18.30 CONTROLLED TYPE 2 DIABETES MELLITUS WITH STAGE 3 CHRONIC KIDNEY DISEASE, WITHOUT LONG-TERM CURRENT USE OF INSULIN (HCC): ICD-10-CM

## 2018-10-25 DIAGNOSIS — I10 ESSENTIAL HYPERTENSION: ICD-10-CM

## 2018-10-25 DIAGNOSIS — E11.22 CONTROLLED TYPE 2 DIABETES MELLITUS WITH STAGE 3 CHRONIC KIDNEY DISEASE, WITHOUT LONG-TERM CURRENT USE OF INSULIN (HCC): ICD-10-CM

## 2018-10-25 LAB
ALBUMIN SERPL-MCNC: 4.3 G/DL (ref 3.5–5.2)
ALBUMIN/GLOBULIN RATIO: 2.2 (ref 1–2.5)
ALP BLD-CCNC: 82 U/L (ref 35–104)
ALT SERPL-CCNC: 36 U/L (ref 5–33)
ANION GAP SERPL CALCULATED.3IONS-SCNC: 15 MMOL/L (ref 9–17)
AST SERPL-CCNC: 26 U/L
BILIRUB SERPL-MCNC: 0.58 MG/DL (ref 0.3–1.2)
BUN BLDV-MCNC: 14 MG/DL (ref 8–23)
BUN/CREAT BLD: ABNORMAL (ref 9–20)
CALCIUM SERPL-MCNC: 9.8 MG/DL (ref 8.6–10.4)
CHLORIDE BLD-SCNC: 105 MMOL/L (ref 98–107)
CHOLESTEROL/HDL RATIO: 4.4
CHOLESTEROL: 201 MG/DL
CO2: 25 MMOL/L (ref 20–31)
CREAT SERPL-MCNC: 0.87 MG/DL (ref 0.5–0.9)
GFR AFRICAN AMERICAN: >60 ML/MIN
GFR NON-AFRICAN AMERICAN: >60 ML/MIN
GFR SERPL CREATININE-BSD FRML MDRD: ABNORMAL ML/MIN/{1.73_M2}
GFR SERPL CREATININE-BSD FRML MDRD: ABNORMAL ML/MIN/{1.73_M2}
GLUCOSE BLD-MCNC: 134 MG/DL (ref 70–99)
HDLC SERPL-MCNC: 46 MG/DL
LDL CHOLESTEROL: 133 MG/DL (ref 0–130)
POTASSIUM SERPL-SCNC: 4.1 MMOL/L (ref 3.7–5.3)
SODIUM BLD-SCNC: 145 MMOL/L (ref 135–144)
TOTAL PROTEIN: 6.3 G/DL (ref 6.4–8.3)
TRIGL SERPL-MCNC: 111 MG/DL
VLDLC SERPL CALC-MCNC: ABNORMAL MG/DL (ref 1–30)

## 2018-10-26 LAB
ESTIMATED AVERAGE GLUCOSE: 140 MG/DL
HBA1C MFR BLD: 6.5 % (ref 4–6)

## 2018-10-30 ENCOUNTER — OFFICE VISIT (OUTPATIENT)
Dept: FAMILY MEDICINE CLINIC | Age: 58
End: 2018-10-30
Payer: COMMERCIAL

## 2018-10-30 VITALS
RESPIRATION RATE: 14 BRPM | SYSTOLIC BLOOD PRESSURE: 134 MMHG | DIASTOLIC BLOOD PRESSURE: 88 MMHG | BODY MASS INDEX: 32.96 KG/M2 | HEART RATE: 64 BPM | TEMPERATURE: 97.3 F | WEIGHT: 186 LBS

## 2018-10-30 DIAGNOSIS — N18.30 CONTROLLED TYPE 2 DIABETES MELLITUS WITH STAGE 3 CHRONIC KIDNEY DISEASE, WITHOUT LONG-TERM CURRENT USE OF INSULIN (HCC): Primary | ICD-10-CM

## 2018-10-30 DIAGNOSIS — Z23 NEED FOR INFLUENZA VACCINATION: ICD-10-CM

## 2018-10-30 DIAGNOSIS — E78.00 PURE HYPERCHOLESTEROLEMIA: ICD-10-CM

## 2018-10-30 DIAGNOSIS — E11.22 CONTROLLED TYPE 2 DIABETES MELLITUS WITH STAGE 3 CHRONIC KIDNEY DISEASE, WITHOUT LONG-TERM CURRENT USE OF INSULIN (HCC): Primary | ICD-10-CM

## 2018-10-30 DIAGNOSIS — F33.1 MODERATE EPISODE OF RECURRENT MAJOR DEPRESSIVE DISORDER (HCC): ICD-10-CM

## 2018-10-30 DIAGNOSIS — M51.16 LUMBAR DISC HERNIATION WITH RADICULOPATHY: ICD-10-CM

## 2018-10-30 DIAGNOSIS — I10 ESSENTIAL HYPERTENSION: ICD-10-CM

## 2018-10-30 PROCEDURE — 3044F HG A1C LEVEL LT 7.0%: CPT | Performed by: PEDIATRICS

## 2018-10-30 PROCEDURE — 3017F COLORECTAL CA SCREEN DOC REV: CPT | Performed by: PEDIATRICS

## 2018-10-30 PROCEDURE — 90688 IIV4 VACCINE SPLT 0.5 ML IM: CPT | Performed by: PEDIATRICS

## 2018-10-30 PROCEDURE — G8427 DOCREV CUR MEDS BY ELIG CLIN: HCPCS | Performed by: PEDIATRICS

## 2018-10-30 PROCEDURE — 99214 OFFICE O/P EST MOD 30 MIN: CPT | Performed by: PEDIATRICS

## 2018-10-30 PROCEDURE — 90471 IMMUNIZATION ADMIN: CPT | Performed by: PEDIATRICS

## 2018-10-30 PROCEDURE — 2022F DILAT RTA XM EVC RTNOPTHY: CPT | Performed by: PEDIATRICS

## 2018-10-30 PROCEDURE — G8417 CALC BMI ABV UP PARAM F/U: HCPCS | Performed by: PEDIATRICS

## 2018-10-30 PROCEDURE — 1036F TOBACCO NON-USER: CPT | Performed by: PEDIATRICS

## 2018-10-30 PROCEDURE — G8482 FLU IMMUNIZE ORDER/ADMIN: HCPCS | Performed by: PEDIATRICS

## 2018-10-30 PROCEDURE — 3014F SCREEN MAMMO DOC REV: CPT | Performed by: PEDIATRICS

## 2018-10-30 RX ORDER — DULOXETIN HYDROCHLORIDE 60 MG/1
60 CAPSULE, DELAYED RELEASE ORAL DAILY
Qty: 90 CAPSULE | Refills: 1 | Status: SHIPPED | OUTPATIENT
Start: 2018-10-30 | End: 2019-04-25 | Stop reason: SDUPTHER

## 2018-10-30 RX ORDER — DULOXETIN HYDROCHLORIDE 60 MG/1
60 CAPSULE, DELAYED RELEASE ORAL DAILY
Qty: 14 CAPSULE | Refills: 0 | Status: SHIPPED | OUTPATIENT
Start: 2018-10-30 | End: 2019-05-02 | Stop reason: SDUPTHER

## 2018-10-30 ASSESSMENT — ENCOUNTER SYMPTOMS
BACK PAIN: 1
COUGH: 1
EYES NEGATIVE: 1
SHORTNESS OF BREATH: 0
WHEEZING: 0
CONSTIPATION: 0
DIARRHEA: 0
RHINORRHEA: 1

## 2018-10-30 NOTE — PROGRESS NOTES
Subjective:      Patient ID: Hailey Guzman is a 59 y.o. female. Visit Information    Have you changed or started any medications since your last visit including any over-the-counter medicines, vitamins, or herbal medicines? no   Have you stopped taking any of your medications? Is so, why? -  Cymbalta. Today is first day without, patient ran out. Are you having any side effects from any of your medications? - no    Have you seen any other physician or provider since your last visit? yes - Dr. Majo Garcia Neurology   Have you had any other diagnostic tests since your last visit? yes - MRI   Have you been seen in the emergency room and/or had an admission in a hospital since we last saw you?  yes - Emre Batter for back   Have you had your routine dental cleaning in the past 6 months?  yes -      Do you have an active MyChart account? If no, what is the barrier?   Yes    Patient Care Team:  Marielena Iverson MD as PCP - General (Internal Medicine/Pediatrics)  Amy Florez MD as Physician (Dermatology)  Darrin Aiken as Physician (Gynecology)  Sol Ferguson as Physician (Podiatry)    Medical History Review  Past Medical, Family, and Social History reviewed and does contribute to the patient presenting condition    Health Maintenance   Topic Date Due    HIV screen  06/25/1973    Shingles Vaccine (1 of 2 - 2 Dose Series) 06/25/2008    Flu vaccine (1) 09/01/2018    Diabetic retinal exam  04/09/2019    Diabetic foot exam  04/23/2019    Breast cancer screen  07/10/2019    A1C test (Diabetic or Prediabetic)  10/25/2019    Lipid screen  10/25/2019    Potassium monitoring  10/25/2019    Creatinine monitoring  10/25/2019    DTaP/Tdap/Td vaccine (2 - Td) 09/24/2020    Colon cancer screen colonoscopy  02/27/2023    Pneumococcal med risk  Completed    Hepatitis C screen  Completed         HPI     Chief Complaint   Patient presents with    Hypertension    Hyperlipidemia    Diabetes       Wt Readings from Last 3 (CYMBALTA) 60 MG extended release capsule Take 1 capsule by mouth daily 90 capsule 1     No current facility-administered medications for this visit. Patient Active Problem List   Diagnosis    Diabetes mellitus 2 with renal manifestations, controlled    Lumbar disc herniation with radiculopathy    Hyperlipidemia    Hypertension    Major depression    CKD (chronic kidney disease) stage 3, GFR 30-59 ml/min ([facility])    Peripheral neuropathy    ARNIE (obstructive sleep apnea)    TMJ arthralgia       Social History   Substance Use Topics    Smoking status: Never Smoker    Smokeless tobacco: Never Used    Alcohol use 0.0 oz/week         Review of Systems   Constitutional: Positive for fatigue. Negative for fever. HENT: Positive for congestion, postnasal drip and rhinorrhea. Eyes: Negative. Respiratory: Positive for cough. Negative for shortness of breath and wheezing. Cardiovascular: Negative for chest pain and leg swelling. Gastrointestinal: Negative for constipation and diarrhea. Endocrine: Negative for polydipsia. Genitourinary: Negative for dysuria and frequency. Musculoskeletal: Positive for arthralgias and back pain. Skin: Negative. Neurological: Positive for weakness (balance issues due to back). Negative for headaches. Psychiatric/Behavioral: Negative for dysphoric mood. The patient is not nervous/anxious. Objective:   Physical Exam   Constitutional: She is oriented to person, place, and time. She appears well-developed. No distress. Overweight    HENT:   Head: Normocephalic and atraumatic. Eyes: Conjunctivae are normal. Right eye exhibits no discharge. Left eye exhibits no discharge. Neck: No thyromegaly present. Cardiovascular: Normal rate and regular rhythm. Exam reveals no distant heart sounds. Pulses:       Carotid pulses are 2+ on the right side, and 2+ on the left side. Radial pulses are 2+ on the right side, and 2+ on the left side.

## 2018-10-30 NOTE — PATIENT INSTRUCTIONS
yourself at home? · Eat a variety of foods every day. Good choices include fruits, vegetables, whole grains (like oatmeal), dried beans and peas, nuts and seeds, soy products (like tofu), and fat-free or low-fat dairy products. · Replace butter, margarine, and hydrogenated or partially hydrogenated oils with olive and canola oils. (Canola oil margarine without trans fat is fine.)  · Replace red meat with fish, poultry, and soy protein (like tofu). · Limit processed and packaged foods like chips, crackers, and cookies. · Bake, broil, or steam foods. Don't phan them. · Be physically active. Get at least 30 minutes of exercise on most days of the week. Walking is a good choice. You also may want to do other activities, such as running, swimming, cycling, or playing tennis or team sports. · Stay at a healthy weight or lose weight by making the changes in eating and physical activity listed above. Losing just a small amount of weight, even 5 to 10 pounds, can reduce your risk for having a heart attack or stroke. · Do not smoke. When should you call for help? Watch closely for changes in your health, and be sure to contact your doctor if:    · You need help making lifestyle changes.     · You have questions about your medicine. Where can you learn more? Go to [email or web address] and sign in to your [employer] account. Enter R532 in the [facility] box to learn more about \"High Cholesterol: Care Instructions. \"     If you do not have an account, please click on the \"Sign Up Now\" link. Current as of: May 10, 2017  Content Version: 11.7  © 0018-3062 [employer]. Care instructions adapted under license by [facility] ([facility]). If you have questions about a medical condition or this instruction, always ask your healthcare professional. [address] any warranty or liability for your use of this information.

## 2019-01-19 DIAGNOSIS — I10 ESSENTIAL HYPERTENSION: Primary | ICD-10-CM

## 2019-01-19 RX ORDER — LISINOPRIL 40 MG/1
40 TABLET ORAL DAILY
Qty: 90 TABLET | Refills: 1 | Status: SHIPPED | OUTPATIENT
Start: 2019-01-19 | End: 2019-10-02 | Stop reason: SDUPTHER

## 2019-02-03 DIAGNOSIS — K21.9 GASTROESOPHAGEAL REFLUX DISEASE, ESOPHAGITIS PRESENCE NOT SPECIFIED: ICD-10-CM

## 2019-02-05 PROBLEM — K21.9 GERD (GASTROESOPHAGEAL REFLUX DISEASE): Status: ACTIVE | Noted: 2019-02-05

## 2019-02-05 RX ORDER — OMEPRAZOLE 20 MG/1
20 CAPSULE, DELAYED RELEASE ORAL DAILY
Qty: 90 CAPSULE | Refills: 1 | Status: SHIPPED | OUTPATIENT
Start: 2019-02-05 | End: 2020-01-08 | Stop reason: SDUPTHER

## 2019-03-12 ENCOUNTER — OFFICE VISIT (OUTPATIENT)
Dept: PSYCHOLOGY | Age: 59
End: 2019-03-12
Payer: COMMERCIAL

## 2019-03-12 DIAGNOSIS — F32.A DEPRESSIVE DISORDER: Primary | ICD-10-CM

## 2019-03-12 PROCEDURE — 90791 PSYCH DIAGNOSTIC EVALUATION: CPT | Performed by: PSYCHOLOGIST

## 2019-03-19 ENCOUNTER — OFFICE VISIT (OUTPATIENT)
Dept: PSYCHOLOGY | Age: 59
End: 2019-03-19
Payer: COMMERCIAL

## 2019-03-19 DIAGNOSIS — F32.A DEPRESSIVE DISORDER: Primary | ICD-10-CM

## 2019-03-19 PROCEDURE — 90832 PSYTX W PT 30 MINUTES: CPT | Performed by: PSYCHOLOGIST

## 2019-04-23 ENCOUNTER — OFFICE VISIT (OUTPATIENT)
Dept: PSYCHOLOGY | Age: 59
End: 2019-04-23
Payer: COMMERCIAL

## 2019-04-23 ENCOUNTER — HOSPITAL ENCOUNTER (OUTPATIENT)
Age: 59
Setting detail: SPECIMEN
Discharge: HOME | End: 2019-04-23
Payer: COMMERCIAL

## 2019-04-23 DIAGNOSIS — E78.00 PURE HYPERCHOLESTEROLEMIA: ICD-10-CM

## 2019-04-23 DIAGNOSIS — F32.A DEPRESSIVE DISORDER: Primary | ICD-10-CM

## 2019-04-23 DIAGNOSIS — I10 ESSENTIAL HYPERTENSION: ICD-10-CM

## 2019-04-23 DIAGNOSIS — E11.22 CONTROLLED TYPE 2 DIABETES MELLITUS WITH STAGE 3 CHRONIC KIDNEY DISEASE, WITHOUT LONG-TERM CURRENT USE OF INSULIN (HCC): ICD-10-CM

## 2019-04-23 DIAGNOSIS — N18.30 CONTROLLED TYPE 2 DIABETES MELLITUS WITH STAGE 3 CHRONIC KIDNEY DISEASE, WITHOUT LONG-TERM CURRENT USE OF INSULIN (HCC): ICD-10-CM

## 2019-04-23 LAB
ALBUMIN SERPL-MCNC: 4.4 G/DL (ref 3.5–5.2)
ALBUMIN/GLOBULIN RATIO: 1.7 (ref 1–2.5)
ALP BLD-CCNC: 85 U/L (ref 35–104)
ALT SERPL-CCNC: 33 U/L (ref 5–33)
ANION GAP SERPL CALCULATED.3IONS-SCNC: 16 MMOL/L (ref 9–17)
AST SERPL-CCNC: 24 U/L
BILIRUB SERPL-MCNC: 0.35 MG/DL (ref 0.3–1.2)
BUN BLDV-MCNC: 13 MG/DL (ref 8–23)
BUN/CREAT BLD: ABNORMAL (ref 9–20)
CALCIUM SERPL-MCNC: 9.5 MG/DL (ref 8.6–10.4)
CHLORIDE BLD-SCNC: 103 MMOL/L (ref 98–107)
CHOLESTEROL/HDL RATIO: 3.9
CHOLESTEROL: 193 MG/DL
CO2: 21 MMOL/L (ref 20–31)
CREAT SERPL-MCNC: 0.76 MG/DL (ref 0.5–0.9)
CREATININE URINE: 259 MG/DL (ref 28–217)
ESTIMATED AVERAGE GLUCOSE: 140 MG/DL
GFR AFRICAN AMERICAN: >60 ML/MIN
GFR NON-AFRICAN AMERICAN: >60 ML/MIN
GFR SERPL CREATININE-BSD FRML MDRD: ABNORMAL ML/MIN/{1.73_M2}
GFR SERPL CREATININE-BSD FRML MDRD: ABNORMAL ML/MIN/{1.73_M2}
GLUCOSE BLD-MCNC: 125 MG/DL (ref 70–99)
HBA1C MFR BLD: 6.5 % (ref 4–6)
HDLC SERPL-MCNC: 50 MG/DL
LDL CHOLESTEROL: 118 MG/DL (ref 0–130)
MICROALBUMIN/CREAT 24H UR: 14 MG/L
MICROALBUMIN/CREAT UR-RTO: 5 MCG/MG CREAT
POTASSIUM SERPL-SCNC: 4.1 MMOL/L (ref 3.7–5.3)
SODIUM BLD-SCNC: 140 MMOL/L (ref 135–144)
TOTAL PROTEIN: 7 G/DL (ref 6.4–8.3)
TRIGL SERPL-MCNC: 126 MG/DL
VLDLC SERPL CALC-MCNC: NORMAL MG/DL (ref 1–30)

## 2019-04-23 PROCEDURE — 90832 PSYTX W PT 30 MINUTES: CPT | Performed by: PSYCHOLOGIST

## 2019-04-23 NOTE — PROGRESS NOTES
Kiran Sams,  Ph.D.   Licensed Clinical Psychologist [phone])      Visit Date: 4/23/2019   Time of appointment:  12:40p-1:11p   Time spent with Patient: 31 minutes. This is patient's third appointment. Reason for Consult:  Depression     Referring Provider/PCP:    Rekha Poon MD      Pt provided informed consent for the behavioral health program. Discussed with patient model of service to include the limits of confidentiality (i.e. abuse reporting, suicide intervention, etc.) and short-term intervention focused approach. Pt indicated understanding. Meredith Gu is a 59 y.o. female who presents for follow up of depression. Nathalia Montes reported slight improvement in her mood despite her  continuing to drink excessively. She reported that she is now trying not to engage when he is drinking and not say anything in order to avoid conflict, which she feels is helping. Nubia and clinician discussed leaving the home (ie- going to visit daughter) when his drinking escalates in order to reduce her exposure. Nathalia Montes also reported that she joined an aqua noodle class (despite a fear of water over her head- she reported that she stays where she can touch the bottom). Previous Recommendations:   1)  Explore some ideas of ways to get out of the house (go for a walk, go down and see daughter tomorrow, increase time doing 4H, cooking and baking)  2)  Nathalia Montes will continue to take medication as prescribed and follow up with her PCP    MENTAL STATUS EXAM  Mood was stable with calm, but somewhat depressed affect. Suicidal ideation was denied. Homicidal ideation was denied. Hygiene was good .  Dress was appropriate. Behavior was Within Normal Limits with Sometimes self-report of difficulties ambulating. Attitude was Cooperative, Tiny Frater, Friendly and Help-seeking. Eye-contact was good.   Speech: rate- WNL, rhythm-  WNL, volume- WNL  Verbalizations were  goal-directed and coherent. Thought processes were intact and goal-oriented without evidence of delusions, hallucinations, obsessions, or kimmie; with little cognitive distortions. Associations were characterized by intact, circumstantial, perseverative and psychotic cognitive processes. Pt was orientated oriented to person, place, time, and general circumstances;  recent:  good and remote:  good. Insight and judgment were estimated to be good, AEB, a good  understanding of cyclical maladaptive patterns, and the ability to use insight to inform behavior change. ASSESSMENT  Nubia reported a somewhat improved mood this week and indicated that she is following recommendations discussed in therapy. She reported that she is actively taking steps to engage more in self-care and that has been helpful. Juhi Medina will continue to benefit from consultation and a focus on improving self-care and identifying ways to improve mood through boundary setting and increasing pleasant activities. Nubia was in agreement with recommendations made. PHQ Scores 4/23/2018 6/6/2016   PHQ2 Score 6 4   PHQ9 Score 12 16     Interpretation of Total Score Depression Severity: 1-4 = Minimal depression, 5-9 = Mild depression, 10-14 = Moderate depression, 15-19 = Moderately severe depression, 20-27 = Severe depression    How often pt has had thoughts of death or hurting self (if PHQ positive for depression):       No flowsheet data found. Interpretation of JHONY-7 score: 5-9 = mild anxiety, 10-14 = moderate anxiety, 15+ = severe anxiety. Recommend referral to behavioral health for scores 10 or greater. DIAGNOSIS  Nubia was seen today for depression.     Diagnoses and all orders for this visit:    Depressive disorder      INTERVENTION  Discussed self-care (sleep, nutrition, rewarding activities, social support, exercise), Established rapport, [place]-setting to identify pt's primary goals for [facility] [place] visit / overall health, Supportive

## 2019-04-25 DIAGNOSIS — F33.1 MODERATE EPISODE OF RECURRENT MAJOR DEPRESSIVE DISORDER (HCC): Primary | ICD-10-CM

## 2019-04-25 NOTE — TELEPHONE ENCOUNTER
LRF 10-30-18 # 90 RF 1  LOV 10-30-18  RTO 6 mo, scheduled    Health Maintenance   Topic Date Due    HIV screen  06/25/1973    Shingles Vaccine (1 of 2) 06/25/2008    Diabetic foot exam  04/23/2019    Diabetic retinal exam  04/09/2019    Breast cancer screen  07/10/2019    A1C test (Diabetic or Prediabetic)  04/23/2020    Lipid screen  04/23/2020    Potassium monitoring  04/23/2020    Creatinine monitoring  04/23/2020    Colon cancer screen colonoscopy  02/27/2023    DTaP/Tdap/Td vaccine (3 - Td) 09/13/2028    Flu vaccine  Completed    Pneumococcal 0-64 years Vaccine  Completed    Hepatitis C screen  Completed             (applicable per patient's age: Cancer Screenings, Depression Screening, Fall Risk Screening, Immunizations)    Hemoglobin A1C (%)   Date Value   04/23/2019 6.5 (H)   10/25/2018 6.5 (H)   04/24/2018 6.3 (H)     Microalb/Crt.  Ratio (mcg/mg creat)   Date Value   04/23/2019 5     LDL Cholesterol (mg/dL)   Date Value   04/23/2019 118     AST (U/L)   Date Value   04/23/2019 24     ALT (U/L)   Date Value   04/23/2019 33     BUN (mg/dL)   Date Value   04/23/2019 13      (goal A1C is < 7)   (goal LDL is <100) need 30-50% reduction from baseline     BP Readings from Last 3 Encounters:   10/30/18 134/88   08/17/18 134/88   04/23/18 128/78    (goal /80)      All Future Testing planned in [facility]:      Next Visit Date:  Future Appointments   Date Time Provider Jazmin Chan   5/2/2019  9:20 AM JORDAN Jackson - [facility]   5/20/2019 11:00 AM Juve Barboza, PhD  [facility]            Patient Active Problem List:     Diabetes mellitus 2 with renal manifestations, controlled     Lumbar disc herniation with radiculopathy     Hyperlipidemia     Hypertension     Major depression     CKD (chronic kidney disease) stage 3, GFR 30-59 ml/min ([facility])     Peripheral neuropathy     ARNIE (obstructive sleep apnea)     TMJ arthralgia     GERD (gastroesophageal reflux disease)

## 2019-04-26 RX ORDER — DULOXETIN HYDROCHLORIDE 60 MG/1
60 CAPSULE, DELAYED RELEASE ORAL DAILY
Qty: 90 CAPSULE | Refills: 1 | Status: SHIPPED | OUTPATIENT
Start: 2019-04-26 | End: 2019-10-23 | Stop reason: SDUPTHER

## 2019-04-29 DIAGNOSIS — N18.30 CONTROLLED TYPE 2 DIABETES MELLITUS WITH STAGE 3 CHRONIC KIDNEY DISEASE, WITHOUT LONG-TERM CURRENT USE OF INSULIN (HCC): Primary | ICD-10-CM

## 2019-04-29 DIAGNOSIS — E11.22 CONTROLLED TYPE 2 DIABETES MELLITUS WITH STAGE 3 CHRONIC KIDNEY DISEASE, WITHOUT LONG-TERM CURRENT USE OF INSULIN (HCC): Primary | ICD-10-CM

## 2019-05-02 ENCOUNTER — OFFICE VISIT (OUTPATIENT)
Dept: FAMILY MEDICINE CLINIC | Age: 59
End: 2019-05-02
Payer: COMMERCIAL

## 2019-05-02 VITALS
BODY MASS INDEX: 31.76 KG/M2 | HEART RATE: 84 BPM | TEMPERATURE: 98.4 F | HEIGHT: 64 IN | WEIGHT: 186 LBS | DIASTOLIC BLOOD PRESSURE: 74 MMHG | SYSTOLIC BLOOD PRESSURE: 124 MMHG | RESPIRATION RATE: 14 BRPM

## 2019-05-02 DIAGNOSIS — E11.22 CONTROLLED TYPE 2 DIABETES MELLITUS WITH STAGE 3 CHRONIC KIDNEY DISEASE, WITHOUT LONG-TERM CURRENT USE OF INSULIN (HCC): Primary | ICD-10-CM

## 2019-05-02 DIAGNOSIS — I10 ESSENTIAL HYPERTENSION: ICD-10-CM

## 2019-05-02 DIAGNOSIS — K21.9 GASTROESOPHAGEAL REFLUX DISEASE, ESOPHAGITIS PRESENCE NOT SPECIFIED: ICD-10-CM

## 2019-05-02 DIAGNOSIS — E78.00 PURE HYPERCHOLESTEROLEMIA: ICD-10-CM

## 2019-05-02 DIAGNOSIS — N18.30 CONTROLLED TYPE 2 DIABETES MELLITUS WITH STAGE 3 CHRONIC KIDNEY DISEASE, WITHOUT LONG-TERM CURRENT USE OF INSULIN (HCC): Primary | ICD-10-CM

## 2019-05-02 DIAGNOSIS — F33.1 MODERATE EPISODE OF RECURRENT MAJOR DEPRESSIVE DISORDER (HCC): ICD-10-CM

## 2019-05-02 PROCEDURE — 2022F DILAT RTA XM EVC RTNOPTHY: CPT | Performed by: NURSE PRACTITIONER

## 2019-05-02 PROCEDURE — G8417 CALC BMI ABV UP PARAM F/U: HCPCS | Performed by: NURSE PRACTITIONER

## 2019-05-02 PROCEDURE — G8427 DOCREV CUR MEDS BY ELIG CLIN: HCPCS | Performed by: NURSE PRACTITIONER

## 2019-05-02 PROCEDURE — 3017F COLORECTAL CA SCREEN DOC REV: CPT | Performed by: NURSE PRACTITIONER

## 2019-05-02 PROCEDURE — 99214 OFFICE O/P EST MOD 30 MIN: CPT | Performed by: NURSE PRACTITIONER

## 2019-05-02 PROCEDURE — 3044F HG A1C LEVEL LT 7.0%: CPT | Performed by: NURSE PRACTITIONER

## 2019-05-02 PROCEDURE — 1036F TOBACCO NON-USER: CPT | Performed by: NURSE PRACTITIONER

## 2019-05-02 RX ORDER — ACETAMINOPHEN 160 MG
1 TABLET,DISINTEGRATING ORAL DAILY
Qty: 30 CAPSULE | Refills: 0 | COMMUNITY
Start: 2019-05-02 | End: 2023-03-23

## 2019-05-02 RX ORDER — FENOFIBRATE 150 MG/1
150 CAPSULE ORAL DAILY
COMMUNITY
End: 2020-04-23 | Stop reason: ALTCHOICE

## 2019-05-02 SDOH — HEALTH STABILITY: MENTAL HEALTH: HOW MANY STANDARD DRINKS CONTAINING ALCOHOL DO YOU HAVE ON A TYPICAL DAY?: 1 OR 2

## 2019-05-02 SDOH — HEALTH STABILITY: MENTAL HEALTH: HOW OFTEN DO YOU HAVE A DRINK CONTAINING ALCOHOL?: 2-3 TIMES A WEEK

## 2019-05-02 ASSESSMENT — ENCOUNTER SYMPTOMS
EYE REDNESS: 0
ABDOMINAL DISTENTION: 0
EYE DISCHARGE: 0
CHEST TIGHTNESS: 0
COUGH: 0
BLOOD IN STOOL: 0
SHORTNESS OF BREATH: 0
SINUS PRESSURE: 0
RHINORRHEA: 0
WHEEZING: 0
SORE THROAT: 0
VOMITING: 0
ABDOMINAL PAIN: 0
BACK PAIN: 1
EYE PAIN: 0
DIARRHEA: 1
NAUSEA: 0

## 2019-05-02 NOTE — PROGRESS NOTES
Subjective:      Patient ID: Aileen Funes is a 59 y.o. female. Visit Information    Have you changed or started any medications since your last visit including any over-the-counter medicines, vitamins, or herbal medicines? no   Are you having any side effects from any of your medications? -  no  Have you stopped taking any of your medications? Is so, why? -  no    Have you seen any other physician or provider since your last visit? Yes - Records Obtainedsecond series of pain injections comprehensive pain management   Have you had any other diagnostic tests since your last visit? No  Have you been seen in the emergency room and/or had an admission to a hospital since we last saw you? No  Have you had your routine dental cleaning in the past 6 months? yes - routine    Have you activated your [employer] account? If not, what are your barriers?  Yes     Patient Care Team:  JORDAN Kerns - CNP as PCP - General (Certified Nurse Practitioner)  Max Odom MD as Physician (Dermatology)  Catherine Rivas as Physician (Gynecology)  Sonam Kohler as Physician (Podiatry)    Medical History Review  Past Medical, Family, and Social History reviewed and does contribute to the patient presenting condition    Health Maintenance   Topic Date Due    HIV screen  06/25/1973    Shingles Vaccine (1 of 2) 06/25/2008    A1C test (Diabetic or Prediabetic)  04/23/2020    Lipid screen  04/23/2020    Potassium monitoring  04/23/2020    Creatinine monitoring  04/23/2020    Diabetic retinal exam  04/29/2020    Diabetic foot exam  05/02/2020    Breast cancer screen  07/26/2020    Colon cancer screen colonoscopy  02/27/2023    DTaP/Tdap/Td vaccine (3 - Td) 09/13/2028    Flu vaccine  Completed    Pneumococcal 0-64 years Vaccine  Completed    Hepatitis C screen  Completed       PHQ Scores 4/23/2018 6/6/2016   PHQ2 Score 6 4   PHQ9 Score 12 16     Interpretation of Total Score DepressionSeverity: 1-4 = Minimal depression, 5-9 = Mild depression, 10-14 = Moderate depression, 15-19 = Moderately severe depression, 20-27 = Severe depression    Current Outpatient Medications   Medication Sig Dispense Refill    Cholecalciferol (VITAMIN D3) 2000 units CAPS Take 1 capsule by mouth daily 30 capsule 0    metFORMIN (GLUCOPHAGE) 500 MG tablet Take 1 tablet by mouth daily (with breakfast) 90 tablet 1    DULoxetine (CYMBALTA) 60 MG extended release capsule Take 1 capsule by mouth daily 90 capsule 1    omeprazole (PRILOSEC) 20 MG delayed release capsule Take 1 capsule by mouth Daily 90 capsule 1    lisinopril (PRINIVIL;ZESTRIL) 40 MG tablet Take 1 tablet by mouth daily 90 tablet 1    atorvastatin (LIPITOR) 80 MG tablet Take 1 tablet by mouth daily New dose as discussed 90 tablet 1    DOCUSATE CALCIUM PO Take 1 capsule by mouth daily as needed      naproxen (NAPROSYN) 500 MG tablet Take 1 tablet by mouth daily as needed      aspirin 81 MG EC tablet Take 81 mg by mouth daily.  fenofibrate (LIPOFEN) 150 MG CAPS capsule Take 150 mg by mouth daily       No current facility-administered medications for this visit. Hypertension   This is a chronic problem. The current episode started more than 1 year ago. The problem is unchanged. The problem is controlled. Pertinent negatives include no chest pain, headaches, orthopnea, palpitations, peripheral edema or shortness of breath. Agents associated with hypertension include NSAIDs. Risk factors for coronary artery disease include obesity, post-menopausal state, diabetes mellitus, family history, dyslipidemia and sedentary lifestyle. Past treatments include ACE inhibitors. The current treatment provides moderate improvement. There are no compliance problems. There is no history of chronic renal disease. Hyperlipidemia   This is a chronic problem. The current episode started more than 1 year ago. The problem is controlled. Exacerbating diseases include diabetes and obesity. lids are normal. Right eye exhibits no discharge. Left eye exhibits no discharge. Neck: Trachea normal and normal range of motion. Neck supple. Cardiovascular: Normal rate, regular rhythm, S1 normal, S2 normal and normal heart sounds. No murmur heard. Pulses:       Radial pulses are 2+ on the right side, and 2+ on the left side. Posterior tibial pulses are 2+ on the right side, and 2+ on the left side. Pulmonary/Chest: Effort normal and breath sounds normal. No respiratory distress. She has no wheezes. She has no rhonchi. Abdominal: Soft. Bowel sounds are normal. She exhibits no distension. There is no hepatosplenomegaly. There is no tenderness. There is no rebound and no CVA tenderness. Musculoskeletal: She exhibits no edema or tenderness. Some DJD   Lymphadenopathy:     She has no cervical adenopathy. Neurological: She is alert and oriented to person, place, and time. She has normal strength. She exhibits normal muscle tone. Coordination normal.   Skin: Skin is warm, dry and intact. No rash noted. No erythema. Visual inspection:  Deformity/amputation: absent  Skin lesions/pre-ulcerative calluses: absent  Edema: right- negative, left- negative    Sensory exam:  Monofilament sensation: normal  (minimum of 5 random plantar locations tested, avoiding callused areas - > 1 area with absence of sensation is + for neuropathy)    Plus at least one of the following:  Pulses: normal,   Pinprick: N/A  Proprioception: Intact  Vibration (128 Hz): N/A     Psychiatric: She has a normal mood and affect. Her speech is normal and behavior is normal. Thought content normal.   Nursing note and vitals reviewed. Assessment:      Diagnosis Orders   1. Controlled type 2 diabetes mellitus with stage 3 chronic kidney disease, without long-term current use of insulin ([facility])   DIABETES FOOT EXAM    Lipid Panel    Comprehensive Metabolic Panel    Hemoglobin A1C    CBC   2.  Pure hypercholesterolemia  Lipid Panel Comprehensive Metabolic Panel    Hemoglobin A1C    CBC   3. Gastroesophageal reflux disease, esophagitis presence not specified     4. Essential hypertension  Lipid Panel    Comprehensive Metabolic Panel    Hemoglobin A1C    CBC   5.  Moderate episode of recurrent major depressive disorder (HCC)  Cholecalciferol (VITAMIN D3) 2000 units CAPS       Lab Results   Component Value Date    WBC 8.2 08/13/2018    HGB 13.5 08/13/2018    HCT 39.1 08/13/2018    MCV 87 08/13/2018     08/13/2018       Lab Results   Component Value Date     04/23/2019    K 4.1 04/23/2019     04/23/2019    CO2 21 04/23/2019    BUN 13 04/23/2019    CREATININE 0.76 04/23/2019    GLUCOSE 125 (H) 04/23/2019    CALCIUM 9.5 04/23/2019    PROT 7.0 04/23/2019    LABALBU 4.4 04/23/2019    BILITOT 0.35 04/23/2019    ALKPHOS 85 04/23/2019    AST 24 04/23/2019    ALT 33 04/23/2019    LABGLOM >60 04/23/2019    GFRAA >60 04/23/2019       Lab Results   Component Value Date    CHOL 193 04/23/2019    CHOL 201 (H) 10/25/2018    CHOL 189 04/24/2018     Lab Results   Component Value Date    TRIG 126 04/23/2019    TRIG 111 10/25/2018    TRIG 96 04/24/2018     Lab Results   Component Value Date    HDL 50 04/23/2019    HDL 46 10/25/2018    HDL 54 04/24/2018     Lab Results   Component Value Date    LDLCHOLESTEROL 118 04/23/2019    LDLCHOLESTEROL 133 (H) 10/25/2018    LDLCHOLESTEROL 116 04/24/2018     Lab Results   Component Value Date    VLDL NOT REPORTED 04/23/2019    VLDL NOT REPORTED 10/25/2018    VLDL NOT REPORTED 04/24/2018     Lab Results   Component Value Date    CHOLHDLRATIO 3.9 04/23/2019    CHOLHDLRATIO 4.4 10/25/2018    CHOLHDLRATIO 3.5 04/24/2018       Lab Results   Component Value Date    LABA1C 6.5 (H) 04/23/2019     Lab Results   Component Value Date     04/23/2019       Plan:     Encouraged to obtain shingrix at pharmacy   Diabetic foot exam today  Reviewed recent labs in detail today  Recommend continue current medications, 5/2/2019 at 10:09 AM

## 2019-05-10 ASSESSMENT — ENCOUNTER SYMPTOMS: ORTHOPNEA: 0

## 2019-05-20 ENCOUNTER — OFFICE VISIT (OUTPATIENT)
Dept: PSYCHOLOGY | Age: 59
End: 2019-05-20
Payer: COMMERCIAL

## 2019-05-20 DIAGNOSIS — F32.A DEPRESSIVE DISORDER: Primary | ICD-10-CM

## 2019-05-20 PROCEDURE — 90834 PSYTX W PT 45 MINUTES: CPT | Performed by: PSYCHOLOGIST

## 2019-05-20 NOTE — PROGRESS NOTES
Aviva Quinn,  Ph.D.   Licensed Clinical Psychologist [phone])      Visit Date: 5/20/2019   Time of appointment:  11:10a-11:50a   Time spent with Patient: 40 minutes. This is patient's fourth appointment. Reason for Consult:  Depression     Referring Provider/PCP:    JORDAN Barron CNP      Pt provided informed consent for the behavioral health program. Discussed with patient model of service to include the limits of confidentiality (i.e. abuse reporting, suicide intervention, etc.) and short-term intervention focused approach. Pt indicated understanding. Alex Valiente is a 59 y.o. female who presents for follow up of depression. Darcie Steward reported that she went to the horse show she was looking forward to and despite some drama, she had a good time. She reported that she stood up for younger girl getting bullied, which was difficult. Nubia and clinician discussed setting limits with  with drinking (not getting in the car or driving with him when he is intoxicated) and reviewed self-care for next few weeks. Darcie Steward agreed to go to water aerobics class and get her nails done. Previous Recommendations:  1)  Nubia will continue to find ways to engage in self-care and be outside of the house  2)  Nubia will continue to take medications as prescribed and follow up with her PCP  3)  Follow up appointment scheduled for 5/20/19    MENTAL STATUS EXAM  Mood was stable with calm, but somewhat depressed affect. Suicidal ideation was denied. Homicidal ideation was denied. Hygiene was good .  Dress was appropriate. Behavior was Within Normal Limits with Sometimes self-report of difficulties ambulating. Attitude was Cooperative, [place], Friendly and Help-seeking. Eye-contact was good. Speech: rate- WNL, rhythm-  WNL, volume- WNL  Verbalizations were  goal-directed and coherent.   Thought processes were intact and goal-oriented without evidence of delusions, hallucinations, obsessions, or kimmie; with little cognitive distortions. Associations were characterized by intact, circumstantial, perseverative and psychotic cognitive processes. Pt was orientated oriented to person, place, time, and general circumstances;  recent:  good and remote:  good. Insight and judgment were estimated to be good, AEB, a good  understanding of cyclical maladaptive patterns, and the ability to use insight to inform behavior change.      ASSESSMENT  Nubia reported improved mood this week, indicated that she is following recommendations discussed in therapy and discussed feeling better overall related to being gone for the last two weeks.  She continues to shift her focuse to health behaviors and self-care, which she reports is helping alleviate stress and some of her depressive symptoms. Juhi Medina will continue to benefit from consultation and a focus on improving self-care and identifying ways to improve mood through boundary setting and increasing pleasant activities. Lilli Carrel was in agreement with continuing to see clinician.        PHQ Scores 4/23/2018 6/6/2016   PHQ2 Score 6 4   PHQ9 Score 12 16      Interpretation of Total Score Depression Severity: 1-4 = Minimal depression, 5-9 = Mild depression, 10-14 = Moderate depression, 15-19 = Moderately severe depression, 20-27 = Severe depression     How often pt has had thoughts of death or hurting self (if PHQ positive for depression):     No flowsheet data found. Interpretation of JHONY-7 score: 5-9 = mild anxiety, 10-14 = moderate anxiety, 15+ = severe anxiety.  Recommend referral to behavioral health for scores 10 or greater.        DIAGNOSIS  Nubia was seen today for depression.     Diagnoses and all orders for this visit:     Depressive disorder        INTERVENTION  Discussed self-care (sleep, nutrition, rewarding activities, social support, exercise), Established rapport, [place]-setting to identify pt's primary goals for [facility] visit / overall health, Supportive techniques, Emphasized self-care as important for managing overall health, Identified maladaptive thoughts and Problem-solving re: ways to increase pleasant activities, increase time spent out of the home      PLAN  1)  Garima Bynum will continue to set boundaries with  around drinking  2)  Garima Bynum will continue to engage in Self-care:  Get nails done and do water aerobics  3)  Nubia will continue to take medications as prescribed and follow up with her PCP  4)  Follow up appointment scheduled for 6/4/19    Electronically signed by Andi Shirley, PhD on 5/20/19 at 11:12 AM

## 2019-06-04 ENCOUNTER — OFFICE VISIT (OUTPATIENT)
Dept: PSYCHOLOGY | Age: 59
End: 2019-06-04
Payer: COMMERCIAL

## 2019-06-04 DIAGNOSIS — F32.A DEPRESSIVE DISORDER: Primary | ICD-10-CM

## 2019-06-04 PROCEDURE — 90832 PSYTX W PT 30 MINUTES: CPT | Performed by: PSYCHOLOGIST

## 2019-06-04 NOTE — PROGRESS NOTES
Sally Bauer,  Ph.D.   Licensed Clinical Psychologist [phone])      Visit Date: 6/4/2019   Time of appointment:  1:10p-1:40p   Time spent with Patient: 30 minutes. This is patient's fifth appointment. Reason for Consult:  Depression     Referring Provider/PCP:    JORDAN Fiore CNP      Pt provided informed consent for the behavioral health program. Discussed with patient model of service to include the limits of confidentiality (i.e. abuse reporting, suicide intervention, etc.) and short-term intervention focused approach. Pt indicated understanding. Corrine Hallman is a 59 y.o. female who presents for follow up of depression. Mira Harley reported that she has done well with setting boundaries with her  around drinking and he has responded well to her limits. She also reported that her children have done also been setting limits over time and she believes this has contributed to some positive changes. She indicated that she continues to engage in self-care. The majority of the session focused on how to maintain boundaries set and her focus on self-care. Previous Recommendations:  1)  Nuiba will continue to set boundaries with  around drinking  2)  Mira Harley will continue to engage in Self-care:  Get nails done and do water aerobics  3)  Nubia will continue to take medications as prescribed and follow up with her PCP  4)  Follow up appointment scheduled for 6/4/19    MENTAL STATUS EXAM  Mood was stable with calm, but somewhat depressed affect. Suicidal ideation was denied. Homicidal ideation was denied. Hygiene was good .  Dress was appropriate. Behavior was Within Normal Limits with Sometimes self-report of difficulties ambulating. Attitude was Cooperative, Abebe Val, Friendly and Help-seeking. Eye-contact was good. Speech: rate- WNL, rhythm-  WNL, volume- WNL  Verbalizations were  goal-directed and coherent.   Thought processes were intact and goal-oriented without evidence of delusions, hallucinations, obsessions, or kimmie; with little cognitive distortions. Associations were characterized by intact, circumstantial, perseverative and psychotic cognitive processes. Pt was orientated oriented to person, place, time, and general circumstances;  recent:  good and remote:  good. Insight and judgment were estimated to be good, AEB, a good  understanding of cyclical maladaptive patterns, and the ability to use insight to inform behavior change.      ASSESSMENT  Nubia reported improved mood this week and reported that she is following recommendations discussed in therapy with some success.  She continues to focus on health behaviors and self-care, which she reports is helping alleviate stress and some of her depressive symptoms.  Nubia will continue to benefit from consultation and a focus on improving self-care and identifying ways to improve mood through boundary setting and increasing pleasant activities. Johny Michaels was in agreement with continuing to see clinician.        PHQ Scores 4/23/2018 6/6/2016   PHQ2 Score 6 4   PHQ9 Score 12 16      Interpretation of Total Score Depression Severity: 1-4 = Minimal depression, 5-9 = Mild depression, 10-14 = Moderate depression, 15-19 = Moderately severe depression, 20-27 = Severe depression     How often pt has had thoughts of death or hurting self (if PHQ positive for depression):     No flowsheet data found. Interpretation of JHONY-7 score: 5-9 = mild anxiety, 10-14 = moderate anxiety, 15+ = severe anxiety.  Recommend referral to behavioral health for scores 10 or greater.        DIAGNOSIS  Nubia was seen today for depression.     Diagnoses and all orders for this visit:     Depressive disorder        INTERVENTION  Discussed self-care (sleep, nutrition, rewarding activities, social support, exercise), Established rapport, [place]-setting to identify pt's primary goals for [facility] visit / overall health, Supportive techniques, Emphasized self-care as important for managing overall health, Identified maladaptive thoughts and Problem-solving re: ways to increase pleasant activities, increase time spent out of the home, setting boundaries with family members     PLAN  1)  Vicenta Arvizu will continue to set boundaries with  around drinking  2)  Vicenta Arvizu will continue to engage in Self-care  3)  Vicenta Arvizu will continue to take medications as prescribed and follow up with her PCP  4)  Follow up appointment scheduled 6/25/19      Electronically signed by Crystal Bryant PhD on 6/4/19 at 1:12 PM

## 2019-06-25 ENCOUNTER — OFFICE VISIT (OUTPATIENT)
Dept: PSYCHOLOGY | Age: 59
End: 2019-06-25
Payer: COMMERCIAL

## 2019-06-25 DIAGNOSIS — F32.A DEPRESSIVE DISORDER: Primary | ICD-10-CM

## 2019-06-25 PROCEDURE — 90832 PSYTX W PT 30 MINUTES: CPT | Performed by: PSYCHOLOGIST

## 2019-06-25 NOTE — PROGRESS NOTES
Elizabeth Whitehead,  Ph.D.   Licensed Clinical Psychologist [phone])      Visit Date: 6/25/2019   Time of appointment:  92:47V-59:17N   Time spent with Patient: 30 minutes. This is patient's sixth appointment. Reason for Consult:  Depression     Referring Provider/PCP:    Ilia Ivy, JORDAN - CNP      Pt provided informed consent for the behavioral health program. Discussed with patient model of service to include the limits of confidentiality (i.e. abuse reporting, suicide intervention, etc.) and short-term intervention focused approach. Pt indicated understanding. Estella Brown is a 62 y.o. female who presents for follow up of depression. Siomara Marie reported that her 's drinking has slowed down a bit and clinician and Siomara Marie discussed how setting boundaries with him has affected his behavior and assisted in decreasing some of her symptoms. 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 She reported that she went to the park with her sons and grandsons for self-care. She also reported that her grandson is showing some signs of autism, which has increased her overall worry. Previous Recommendations:   1)  Siomara Marie will continue to set boundaries with  around drinking  2)  Siomara Marie will continue to engage in Self-care  3)  Siomara Marie will continue to take medications as prescribed and follow up with her PCP  4)  Follow up appointment scheduled 6/25/19    MENTAL STATUS EXAM  Mood was stable with calm, but somewhat depressed affect; improved from previous session. Suicidal ideation was denied. Homicidal ideation was denied. Hygiene was good .  Dress was appropriate. Behavior was Within Normal Limits with Sometimes self-report of difficulties ambulating.    Attitude was Cooperative, Engageable, Friendly and Help-seeking. Eye-contact was good. Speech: rate- WNL, rhythm-  WNL, volume- WNL  Verbalizations were  goal-directed and coherent. Thought processes were intact and goal-oriented without evidence of delusions, hallucinations, obsessions, or kimmie; with little cognitive distortions. Associations were characterized by intact, circumstantial, perseverative and psychotic cognitive processes. Pt was orientated oriented to person, place, time, and general circumstances;  recent:  good and remote:  good. Insight and judgment were estimated to be good, AEB, a good  understanding of cyclical maladaptive patterns, and the ability to use insight to inform behavior change.        ASSESSMENT  Eduardo Ching presented to the appointment today for evaluation and treatment of symptoms of depression. She is currently deemed no risk to herself or others. Tiera Lawler will continue to benefit from interventions focused on self-care, setting boundaries with family members, and problem solving ways to continue to decrease stressors in her life. Nubia was in agreement with recommendations. PHQ Scores 4/23/2018 6/6/2016   PHQ2 Score 6 4   PHQ9 Score 12 16     Interpretation of Total Score Depression Severity: 1-4 = Minimal depression, 5-9 = Mild depression, 10-14 = Moderate depression, 15-19 = Moderately severe depression, 20-27 = Severe depression    How often pt has had thoughts of death or hurting self (if PHQ positive for depression):       No flowsheet data found. Interpretation of JHONY-7 score: 5-9 = mild anxiety, 10-14 = moderate anxiety, 15+ = severe anxiety. Recommend referral to behavioral health for scores 10 or greater. DIAGNOSIS  Nubia was seen today for depression.     Diagnoses and all orders for this visit:    Depressive disorder      INTERVENTION  Discussed self-care (sleep, nutrition, rewarding activities, social support, exercise), Established rapport, [place]-setting to identify pt's primary goals for [facility] visit / overall health, Supportive techniques, Emphasized self-care as important for managing overall health, Identified maladaptive thoughts and Problem-solving re: ways to increase pleasant activities, increase time spent out of the home, setting boundaries with family members    PLAN  1)  Kimber Savage will continue to set boundaries with  around drinking  2)  Kimber Savage will continue to engage in self-care and explore ways she can continue to work with 4H and decrease the stress  3)  Kimber Savage will continue to take medications as prescribed and follow up with her PCP  4)  Follow up appointment scheduled for 8/6/19      INTERACTIVE COMPLEXITY  Is interactive complexity present?   No  Reason:  N/A  Additional Supporting Information:  N/A       Electronically signed by Jose Cartagena, PhD on 6/25/19 at 11:09 AM

## 2019-08-06 ENCOUNTER — OFFICE VISIT (OUTPATIENT)
Dept: PSYCHOLOGY | Age: 59
End: 2019-08-06
Payer: COMMERCIAL

## 2019-08-06 DIAGNOSIS — F32.A DEPRESSIVE DISORDER: Primary | ICD-10-CM

## 2019-08-06 PROCEDURE — 90832 PSYTX W PT 30 MINUTES: CPT | Performed by: PSYCHOLOGIST

## 2019-08-27 ENCOUNTER — OFFICE VISIT (OUTPATIENT)
Dept: PSYCHOLOGY | Age: 59
End: 2019-08-27
Payer: COMMERCIAL

## 2019-08-27 DIAGNOSIS — F32.A DEPRESSIVE DISORDER: Primary | ICD-10-CM

## 2019-08-27 PROCEDURE — 90832 PSYTX W PT 30 MINUTES: CPT | Performed by: PSYCHOLOGIST

## 2019-09-17 ENCOUNTER — OFFICE VISIT (OUTPATIENT)
Dept: PSYCHOLOGY | Age: 59
End: 2019-09-17
Payer: COMMERCIAL

## 2019-09-17 DIAGNOSIS — F32.A DEPRESSIVE DISORDER: Primary | ICD-10-CM

## 2019-09-17 PROCEDURE — 90832 PSYTX W PT 30 MINUTES: CPT | Performed by: PSYCHOLOGIST

## 2019-09-17 NOTE — PROGRESS NOTES
boundary setting      PLAN  1)  Yemi Pearson will look for ways to \"replace\" horses/fair, etc in volunteering for horse shows  2)  Yemi Pearson will continue to engage in self-care activities  3)  Yemi Pearson will continue to take medications as prescribed and follow up with her PCP  4)  Follow up appointment scheduled for 10/15/19      INTERACTIVE COMPLEXITY  Is interactive complexity present?   No  Reason:  N/A  Additional Supporting Information:  N/A       Electronically signed by Gisela Kendrick PhD on 9/17/19 at 11:09 AM

## 2019-10-15 ENCOUNTER — OFFICE VISIT (OUTPATIENT)
Dept: PSYCHOLOGY | Age: 59
End: 2019-10-15
Payer: COMMERCIAL

## 2019-10-15 DIAGNOSIS — F32.A DEPRESSIVE DISORDER: Primary | ICD-10-CM

## 2019-10-15 PROCEDURE — 90832 PSYTX W PT 30 MINUTES: CPT | Performed by: PSYCHOLOGIST

## 2019-10-26 DIAGNOSIS — E11.22 CONTROLLED TYPE 2 DIABETES MELLITUS WITH STAGE 3 CHRONIC KIDNEY DISEASE, WITHOUT LONG-TERM CURRENT USE OF INSULIN (HCC): ICD-10-CM

## 2019-10-26 DIAGNOSIS — N18.30 CONTROLLED TYPE 2 DIABETES MELLITUS WITH STAGE 3 CHRONIC KIDNEY DISEASE, WITHOUT LONG-TERM CURRENT USE OF INSULIN (HCC): ICD-10-CM

## 2019-11-04 ENCOUNTER — HOSPITAL ENCOUNTER (OUTPATIENT)
Age: 59
Setting detail: SPECIMEN
Discharge: HOME | End: 2019-11-04
Payer: COMMERCIAL

## 2019-11-04 DIAGNOSIS — E11.22 CONTROLLED TYPE 2 DIABETES MELLITUS WITH STAGE 3 CHRONIC KIDNEY DISEASE, WITHOUT LONG-TERM CURRENT USE OF INSULIN (HCC): ICD-10-CM

## 2019-11-04 DIAGNOSIS — E78.00 PURE HYPERCHOLESTEROLEMIA: ICD-10-CM

## 2019-11-04 DIAGNOSIS — N18.30 CONTROLLED TYPE 2 DIABETES MELLITUS WITH STAGE 3 CHRONIC KIDNEY DISEASE, WITHOUT LONG-TERM CURRENT USE OF INSULIN (HCC): ICD-10-CM

## 2019-11-04 DIAGNOSIS — I10 ESSENTIAL HYPERTENSION: ICD-10-CM

## 2019-11-04 LAB
ALBUMIN SERPL-MCNC: 4.3 G/DL (ref 3.5–5.2)
ALBUMIN/GLOBULIN RATIO: 1.7 (ref 1–2.5)
ALP BLD-CCNC: 105 U/L (ref 35–104)
ALT SERPL-CCNC: 34 U/L (ref 5–33)
ANION GAP SERPL CALCULATED.3IONS-SCNC: 15 MMOL/L (ref 9–17)
AST SERPL-CCNC: 26 U/L
BILIRUB SERPL-MCNC: 0.72 MG/DL (ref 0.3–1.2)
BUN BLDV-MCNC: 12 MG/DL (ref 8–23)
BUN/CREAT BLD: ABNORMAL (ref 9–20)
CALCIUM SERPL-MCNC: 9.8 MG/DL (ref 8.6–10.4)
CHLORIDE BLD-SCNC: 105 MMOL/L (ref 98–107)
CHOLESTEROL/HDL RATIO: 4
CHOLESTEROL: 182 MG/DL
CO2: 23 MMOL/L (ref 20–31)
CREAT SERPL-MCNC: 0.71 MG/DL (ref 0.5–0.9)
ESTIMATED AVERAGE GLUCOSE: 146 MG/DL
GFR AFRICAN AMERICAN: >60 ML/MIN
GFR NON-AFRICAN AMERICAN: >60 ML/MIN
GFR SERPL CREATININE-BSD FRML MDRD: ABNORMAL ML/MIN/{1.73_M2}
GFR SERPL CREATININE-BSD FRML MDRD: ABNORMAL ML/MIN/{1.73_M2}
GLUCOSE BLD-MCNC: 141 MG/DL (ref 70–99)
HBA1C MFR BLD: 6.7 % (ref 4–6)
HCT VFR BLD CALC: 44.5 % (ref 36.3–47.1)
HDLC SERPL-MCNC: 46 MG/DL
HEMOGLOBIN: 14.3 G/DL (ref 11.9–15.1)
LDL CHOLESTEROL: 107 MG/DL (ref 0–130)
MCH RBC QN AUTO: 28.9 PG (ref 25.2–33.5)
MCHC RBC AUTO-ENTMCNC: 32.1 G/DL (ref 28.4–34.8)
MCV RBC AUTO: 89.9 FL (ref 82.6–102.9)
NRBC AUTOMATED: 0 PER 100 WBC
PDW BLD-RTO: 12.8 % (ref 11.8–14.4)
PLATELET # BLD: 300 K/UL (ref 138–453)
PMV BLD AUTO: 11 FL (ref 8.1–13.5)
POTASSIUM SERPL-SCNC: 3.9 MMOL/L (ref 3.7–5.3)
RBC # BLD: 4.95 M/UL (ref 3.95–5.11)
SODIUM BLD-SCNC: 143 MMOL/L (ref 135–144)
TOTAL PROTEIN: 6.9 G/DL (ref 6.4–8.3)
TRIGL SERPL-MCNC: 145 MG/DL
VLDLC SERPL CALC-MCNC: NORMAL MG/DL (ref 1–30)
WBC # BLD: 9.2 K/UL (ref 3.5–11.3)

## 2019-11-26 ENCOUNTER — OFFICE VISIT (OUTPATIENT)
Dept: PSYCHOLOGY | Age: 59
End: 2019-11-26
Payer: COMMERCIAL

## 2019-11-26 DIAGNOSIS — F32.A DEPRESSIVE DISORDER: Primary | ICD-10-CM

## 2019-11-26 PROCEDURE — 90832 PSYTX W PT 30 MINUTES: CPT | Performed by: PSYCHOLOGIST

## 2019-11-27 ENCOUNTER — OFFICE VISIT (OUTPATIENT)
Dept: FAMILY MEDICINE CLINIC | Age: 59
End: 2019-11-27
Payer: COMMERCIAL

## 2019-11-27 VITALS
SYSTOLIC BLOOD PRESSURE: 138 MMHG | RESPIRATION RATE: 16 BRPM | HEART RATE: 72 BPM | DIASTOLIC BLOOD PRESSURE: 86 MMHG | WEIGHT: 180 LBS | TEMPERATURE: 97 F | BODY MASS INDEX: 30.9 KG/M2

## 2019-11-27 DIAGNOSIS — I10 ESSENTIAL HYPERTENSION: ICD-10-CM

## 2019-11-27 DIAGNOSIS — G89.29 CHRONIC PAIN OF LEFT KNEE: ICD-10-CM

## 2019-11-27 DIAGNOSIS — N18.30 CONTROLLED TYPE 2 DIABETES MELLITUS WITH STAGE 3 CHRONIC KIDNEY DISEASE, WITHOUT LONG-TERM CURRENT USE OF INSULIN (HCC): Primary | ICD-10-CM

## 2019-11-27 DIAGNOSIS — F33.1 MODERATE EPISODE OF RECURRENT MAJOR DEPRESSIVE DISORDER (HCC): ICD-10-CM

## 2019-11-27 DIAGNOSIS — E11.22 CONTROLLED TYPE 2 DIABETES MELLITUS WITH STAGE 3 CHRONIC KIDNEY DISEASE, WITHOUT LONG-TERM CURRENT USE OF INSULIN (HCC): Primary | ICD-10-CM

## 2019-11-27 DIAGNOSIS — M25.562 CHRONIC PAIN OF LEFT KNEE: ICD-10-CM

## 2019-11-27 DIAGNOSIS — E78.00 PURE HYPERCHOLESTEROLEMIA: ICD-10-CM

## 2019-11-27 PROCEDURE — 3017F COLORECTAL CA SCREEN DOC REV: CPT | Performed by: NURSE PRACTITIONER

## 2019-11-27 PROCEDURE — 99214 OFFICE O/P EST MOD 30 MIN: CPT | Performed by: NURSE PRACTITIONER

## 2019-11-27 PROCEDURE — 3044F HG A1C LEVEL LT 7.0%: CPT | Performed by: NURSE PRACTITIONER

## 2019-11-27 PROCEDURE — 1036F TOBACCO NON-USER: CPT | Performed by: NURSE PRACTITIONER

## 2019-11-27 PROCEDURE — G8417 CALC BMI ABV UP PARAM F/U: HCPCS | Performed by: NURSE PRACTITIONER

## 2019-11-27 PROCEDURE — G8482 FLU IMMUNIZE ORDER/ADMIN: HCPCS | Performed by: NURSE PRACTITIONER

## 2019-11-27 PROCEDURE — G8427 DOCREV CUR MEDS BY ELIG CLIN: HCPCS | Performed by: NURSE PRACTITIONER

## 2019-11-27 PROCEDURE — 2022F DILAT RTA XM EVC RTNOPTHY: CPT | Performed by: NURSE PRACTITIONER

## 2019-11-27 ASSESSMENT — ENCOUNTER SYMPTOMS
ABDOMINAL PAIN: 0
BLOOD IN STOOL: 0
VOMITING: 0
BACK PAIN: 1
EYE PAIN: 0
NAUSEA: 0
RHINORRHEA: 0
EYE REDNESS: 0
SINUS PRESSURE: 0
ABDOMINAL DISTENTION: 0
CHEST TIGHTNESS: 0
WHEEZING: 0
DIARRHEA: 1
SORE THROAT: 0
SHORTNESS OF BREATH: 0
EYE DISCHARGE: 0
COUGH: 0

## 2019-12-16 DIAGNOSIS — I10 ESSENTIAL HYPERTENSION: ICD-10-CM

## 2019-12-16 RX ORDER — LISINOPRIL 40 MG/1
40 TABLET ORAL DAILY
Qty: 90 TABLET | Refills: 1 | Status: CANCELLED | OUTPATIENT
Start: 2019-12-16 | End: 2020-12-15

## 2019-12-16 RX ORDER — LISINOPRIL 40 MG/1
40 TABLET ORAL DAILY
Qty: 90 TABLET | Refills: 1 | OUTPATIENT
Start: 2019-12-16 | End: 2020-12-15

## 2020-01-08 ENCOUNTER — OFFICE VISIT (OUTPATIENT)
Dept: PSYCHOLOGY | Age: 60
End: 2020-01-08
Payer: COMMERCIAL

## 2020-01-08 PROCEDURE — 90832 PSYTX W PT 30 MINUTES: CPT | Performed by: PSYCHOLOGIST

## 2020-01-08 PROCEDURE — 1036F TOBACCO NON-USER: CPT | Performed by: PSYCHOLOGIST

## 2020-01-08 RX ORDER — ATORVASTATIN CALCIUM 80 MG/1
80 TABLET, FILM COATED ORAL DAILY
Qty: 90 TABLET | Refills: 1 | Status: SHIPPED | OUTPATIENT
Start: 2020-01-08 | End: 2020-08-27 | Stop reason: SDUPTHER

## 2020-01-08 RX ORDER — OMEPRAZOLE 20 MG/1
20 CAPSULE, DELAYED RELEASE ORAL DAILY
Qty: 90 CAPSULE | Refills: 1 | Status: SHIPPED | OUTPATIENT
Start: 2020-01-08 | End: 2020-07-06

## 2020-01-08 NOTE — TELEPHONE ENCOUNTER
LOV 11-27-19  LRF Prilosec 2-5-19, Lipitor 4-27-18  RTO in 6 mos. Health Maintenance   Topic Date Due    HIV screen  06/25/1973    Shingles Vaccine (1 of 2) 06/25/2008    Diabetic retinal exam  04/29/2020    Diabetic foot exam  05/02/2020    Breast cancer screen  07/26/2020    A1C test (Diabetic or Prediabetic)  11/04/2020    Lipid screen  11/04/2020    Potassium monitoring  11/04/2020    Creatinine monitoring  11/04/2020    Colon cancer screen colonoscopy  02/27/2023    DTaP/Tdap/Td vaccine (3 - Td) 09/13/2028    Flu vaccine  Completed    Pneumococcal 0-64 years Vaccine  Completed    Hepatitis C screen  Completed             (applicable per patient's age: Cancer Screenings, Depression Screening, Fall Risk Screening, Immunizations)    Hemoglobin A1C (%)   Date Value   11/04/2019 6.7 (H)   04/23/2019 6.5 (H)   10/25/2018 6.5 (H)     Microalb/Crt.  Ratio (mcg/mg creat)   Date Value   04/23/2019 5     LDL Cholesterol (mg/dL)   Date Value   11/04/2019 107     AST (U/L)   Date Value   11/04/2019 26     ALT (U/L)   Date Value   11/04/2019 34 (H)     BUN (mg/dL)   Date Value   11/04/2019 12      (goal A1C is < 7)   (goal LDL is <100) need 30-50% reduction from baseline     BP Readings from Last 3 Encounters:   11/27/19 138/86   05/02/19 124/74   10/30/18 134/88    (goal /80)      All Future Testing planned in [facility]:      Next Visit Date:  Future Appointments   Date Time Provider Jazmin Chan   4/23/2020  9:20 AM Pham Hein APRN - NP [facility]            Patient Active Problem List:     Diabetes mellitus 2 with renal manifestations, controlled     Lumbar disc herniation with radiculopathy     Hyperlipidemia     Hypertension     Major depression     CKD (chronic kidney disease) stage 3, GFR 30-59 ml/min ([facility])     Peripheral neuropathy     ARNIE (obstructive sleep apnea)     TMJ arthralgia     GERD (gastroesophageal reflux disease)

## 2020-04-23 ENCOUNTER — TELEMEDICINE (OUTPATIENT)
Dept: FAMILY MEDICINE CLINIC | Age: 60
End: 2020-04-23
Payer: COMMERCIAL

## 2020-04-23 VITALS — WEIGHT: 181 LBS | BODY MASS INDEX: 31.07 KG/M2

## 2020-04-23 PROCEDURE — 99214 OFFICE O/P EST MOD 30 MIN: CPT | Performed by: NURSE PRACTITIONER

## 2020-04-23 RX ORDER — LISINOPRIL 40 MG/1
40 TABLET ORAL DAILY
Qty: 90 TABLET | Refills: 1 | Status: SHIPPED | OUTPATIENT
Start: 2020-04-23 | End: 2020-10-20

## 2020-04-23 RX ORDER — MULTIVIT WITH MINERALS/LUTEIN
1000 TABLET ORAL DAILY
COMMUNITY

## 2020-04-23 SDOH — ECONOMIC STABILITY: INCOME INSECURITY: HOW HARD IS IT FOR YOU TO PAY FOR THE VERY BASICS LIKE FOOD, HOUSING, MEDICAL CARE, AND HEATING?: NOT HARD AT ALL

## 2020-04-23 SDOH — ECONOMIC STABILITY: FOOD INSECURITY: WITHIN THE PAST 12 MONTHS, THE FOOD YOU BOUGHT JUST DIDN'T LAST AND YOU DIDN'T HAVE MONEY TO GET MORE.: NEVER TRUE

## 2020-04-23 SDOH — ECONOMIC STABILITY: FOOD INSECURITY: WITHIN THE PAST 12 MONTHS, YOU WORRIED THAT YOUR FOOD WOULD RUN OUT BEFORE YOU GOT MONEY TO BUY MORE.: NEVER TRUE

## 2020-04-23 ASSESSMENT — ENCOUNTER SYMPTOMS
SHORTNESS OF BREATH: 0
GASTROINTESTINAL NEGATIVE: 1
COUGH: 0
BACK PAIN: 1

## 2020-04-23 NOTE — PROGRESS NOTES
has stopped since physical therapy started using a hand-held massager. Patient currently has 2 rods in 6-8 screws in place. Patient previously was following with Dr. Oxana Bustillo, psychology, for depression. Patient has not followed with her since prior to her back surgery in January. Patient states she is doing well overall with minimal feelings of depression, and slight anxiety only due to recent pandemic. Patient is retired and [place]. 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 Diabetes   She presents for her follow-up diabetic visit. She has type 2 diabetes mellitus. Her disease course has been stable. Hypoglycemia symptoms include headaches (mild, tylenol relieves ), nervousness/anxiousness (dt recent pandemic ) and tremors (w/ low BS). Pertinent negatives for hypoglycemia include no dizziness or hunger. Pertinent negatives for diabetes include no chest pain, no fatigue and no weakness. There are no hypoglycemic complications. Symptoms are stable. Pertinent negatives for diabetic complications include no peripheral neuropathy or retinopathy. Risk factors for coronary artery disease include obesity, hypertension, diabetes mellitus and dyslipidemia. Current diabetic treatment includes oral agent (monotherapy). She is compliant with treatment most of the time. She is following a generally healthy diet. When asked about meal planning, she reported none. There is no change in her home blood glucose trend. Her overall blood glucose range is 110-130 mg/dl. An ACE inhibitor/angiotensin II receptor blocker is being taken. She does not see a podiatrist.Eye exam is current. Hypertension   This is a chronic problem. The current episode started more than 1 year ago. The problem is unchanged. The problem is controlled. Associated symptoms include headaches (mild, tylenol relieves ). Pertinent negatives include no chest pain, palpitations or shortness of breath.  Agents associated with hypertension include needed      aspirin 81 MG EC tablet Take 81 mg by mouth daily. No current facility-administered medications for this visit. Allergies   Allergen Reactions    Adhesive Tape Other (See Comments)     Blistering and skin lesions     Demerol [Meperidine Hcl] Other (See Comments)     Hallucination     Penicillin G Potassium Nausea Only       Subjective:  Review of Systems   Constitutional: Negative for activity change, appetite change, chills, fatigue and fever. HENT: Negative. Respiratory: Negative for cough and shortness of breath. Cardiovascular: Negative for chest pain, palpitations and leg swelling. Gastrointestinal: Negative. Genitourinary: Negative. Musculoskeletal: Positive for arthralgias, back pain and myalgias. Negative for gait problem. Patient had a total of 3 back surgeries from L3-S1 in total.  Patient had a most recent January 2020. Patient continues follow-up physical therapy. Skin: Negative. Neurological: Positive for tremors (w/ low BS) and headaches (mild, tylenol relieves ). Negative for dizziness, syncope, weakness, light-headedness and numbness. Psychiatric/Behavioral: Negative for agitation and sleep disturbance. The patient is nervous/anxious (dt recent pandemic ). Patient no longer follows with psychology for her depression. Objective:  Physical Exam  Constitutional:       Appearance: Normal appearance. She is well-developed and well-groomed. HENT:      Right Ear: Hearing normal.      Left Ear: Hearing normal.      Mouth/Throat:      Lips: Pink. Pulmonary:      Effort: Pulmonary effort is normal. No respiratory distress. Skin:     Coloration: Skin is not cyanotic, jaundiced or pale. Neurological:      Mental Status: She is alert and oriented to person, place, and time.    Psychiatric:         Attention and Perception: Attention and perception normal.         Mood and Affect: Mood and affect normal.         Speech: Speech normal.         Behavior: Behavior is cooperative. Thought Content: Thought content normal.         Cognition and Memory: Cognition and memory normal.         Judgment: Judgment normal.         Due to this being a TeleHealth encounter, evaluation of the following organ systems is limited: Vitals/Constitutional/EENT/Resp/CV/GI//MS/Neuro/Skin/Heme-Lymph-Imm. Assessment:  1. Essential hypertension  -Stable, medicated, monitered   - CBC; Future  - TSH with Reflex; Future  - lisinopril (PRINIVIL;ZESTRIL) 40 MG tablet; Take 1 tablet by mouth daily  Dispense: 90 tablet; Refill: 1    2. Controlled type 2 diabetes mellitus with stage 3 chronic kidney disease, without long-term current use of insulin (HCC)  -Stable, medicated, monitered   - Comprehensive Metabolic Panel, Fasting; Future  - Hemoglobin A1C; Future  Patient to continue taking Glucophage as previously prescribed. 3. Gastroesophageal reflux disease, esophagitis presence not specified  -Stable, medicated, monitered   Patient to continue taking Prilosec as previously prescribed. 4. Pure hypercholesterolemia  -Stable, medicated, monitered   - Lipid Panel; Future  Patient continue taking her Lipitor as previously prescribed    5. Moderate episode of recurrent major depressive disorder (HCC)  -Stable, medicated, monitered   Patient is to continue taking her Cymbalta as previously prescribed. If patient would like a new referral to psychology she is to let me know. Plan:  Return in about 6 months (around 10/23/2020). An  electronic signature was used to authenticate this note. Communication:  Items for Patient/Writer/Staff to Accomplish  Please remind to complete ordered lab work prior to scheduled appointment. Please call patient for follow up appointment to be scheduled as discussed in VV for 6 month.     --Chely Guerrero, APRN - CNP on 4/23/2020 at 10:07 AM19}    This is a telehealth visit that was performed with the originating site at

## 2020-05-26 RX ORDER — DULOXETIN HYDROCHLORIDE 60 MG/1
60 CAPSULE, DELAYED RELEASE ORAL DAILY
Qty: 90 CAPSULE | Refills: 1 | Status: SHIPPED | OUTPATIENT
Start: 2020-05-26 | End: 2020-11-23

## 2020-07-07 RX ORDER — OMEPRAZOLE 20 MG/1
20 CAPSULE, DELAYED RELEASE ORAL DAILY
Qty: 90 CAPSULE | Refills: 1 | Status: SHIPPED | OUTPATIENT
Start: 2020-07-07 | End: 2021-01-04

## 2020-07-07 NOTE — TELEPHONE ENCOUNTER
(chronic kidney disease) stage 3, GFR 30-59 ml/min ([facility])     Peripheral neuropathy     ARNIE (obstructive sleep apnea)     TMJ arthralgia     GERD (gastroesophageal reflux disease)

## 2020-08-27 RX ORDER — ATORVASTATIN CALCIUM 80 MG/1
TABLET, FILM COATED ORAL
Qty: 90 TABLET | Refills: 3 | OUTPATIENT
Start: 2020-08-27 | End: 2021-08-27

## 2020-08-27 RX ORDER — ATORVASTATIN CALCIUM 80 MG/1
80 TABLET, FILM COATED ORAL DAILY
Qty: 90 TABLET | Refills: 1 | Status: SHIPPED | OUTPATIENT
Start: 2020-08-27 | End: 2021-01-07 | Stop reason: SDUPTHER

## 2020-08-27 NOTE — TELEPHONE ENCOUNTER
LOV 4/23/20  RTO 6 months; F/U scheduled  LRF 1/8/20    Health Maintenance   Topic Date Due    HIV screen  06/25/1973    Shingles Vaccine (1 of 2) 06/25/2008    Diabetic foot exam  05/02/2020    Breast cancer screen  07/26/2020    Flu vaccine (1) 09/01/2020    A1C test (Diabetic or Prediabetic)  11/04/2020    Lipid screen  11/04/2020    Potassium monitoring  11/04/2020    Creatinine monitoring  11/04/2020    Diabetic retinal exam  05/07/2021    Colon cancer screen colonoscopy  02/27/2023    DTaP/Tdap/Td vaccine (3 - Td) 09/13/2028    Pneumococcal 0-64 years Vaccine  Completed    Hepatitis C screen  Completed    Hepatitis A vaccine  Aged Out    Hib vaccine  Aged Out    Meningococcal (ACWY) vaccine  Aged Out             (applicable per patient's age: Cancer Screenings, Depression Screening, Fall Risk Screening, Immunizations)    Hemoglobin A1C (%)   Date Value   11/04/2019 6.7 (H)   04/23/2019 6.5 (H)   10/25/2018 6.5 (H)     Microalb/Crt.  Ratio (mcg/mg creat)   Date Value   04/23/2019 5     LDL Cholesterol (mg/dL)   Date Value   11/04/2019 107     AST (U/L)   Date Value   11/04/2019 26     ALT (U/L)   Date Value   11/04/2019 34 (H)     BUN (mg/dL)   Date Value   11/04/2019 12      (goal A1C is < 7)   (goal LDL is <100) need 30-50% reduction from baseline     BP Readings from Last 3 Encounters:   11/27/19 138/86   05/02/19 124/74   10/30/18 134/88    (goal /80)      All Future Testing planned in [facility]:  Lab Frequency Next Occurrence   CBC Once 04/23/2020   Comprehensive Metabolic Panel, Fasting Once 04/23/2020   Hemoglobin A1C Once 04/23/2020   Lipid Panel Once 04/23/2020   TSH with Reflex Once 04/23/2020       Next Visit Date:  Future Appointments   Date Time Provider Jazmin Chan   10/29/2020  9:00 AM Pham Zacarias, APRN - CNP [facility]            Patient Active Problem List:     Diabetes mellitus 2 with renal manifestations, controlled     Lumbar disc herniation with

## 2020-08-27 NOTE — TELEPHONE ENCOUNTER
Lov: 4/23/20  Lrf: 1/8/2020  Na: 10/29/20  Patient called and states she has been out of this medication for 3 weeks. Health Maintenance   Topic Date Due    HIV screen  06/25/1973    Shingles Vaccine (1 of 2) 06/25/2008    Diabetic foot exam  05/02/2020    Breast cancer screen  07/26/2020    Flu vaccine (1) 09/01/2020    A1C test (Diabetic or Prediabetic)  11/04/2020    Lipid screen  11/04/2020    Potassium monitoring  11/04/2020    Creatinine monitoring  11/04/2020    Diabetic retinal exam  05/07/2021    Colon cancer screen colonoscopy  02/27/2023    DTaP/Tdap/Td vaccine (3 - Td) 09/13/2028    Pneumococcal 0-64 years Vaccine  Completed    Hepatitis C screen  Completed    Hepatitis A vaccine  Aged Out    Hib vaccine  Aged Out    Meningococcal (ACWY) vaccine  Aged Out             (applicable per patient's age: Cancer Screenings, Depression Screening, Fall Risk Screening, Immunizations)    Hemoglobin A1C (%)   Date Value   11/04/2019 6.7 (H)   04/23/2019 6.5 (H)   10/25/2018 6.5 (H)     Microalb/Crt.  Ratio (mcg/mg creat)   Date Value   04/23/2019 5     LDL Cholesterol (mg/dL)   Date Value   11/04/2019 107     AST (U/L)   Date Value   11/04/2019 26     ALT (U/L)   Date Value   11/04/2019 34 (H)     BUN (mg/dL)   Date Value   11/04/2019 12      (goal A1C is < 7)   (goal LDL is <100) need 30-50% reduction from baseline     BP Readings from Last 3 Encounters:   11/27/19 138/86   05/02/19 124/74   10/30/18 134/88    (goal /80)      All Future Testing planned in [facility]:  Lab Frequency Next Occurrence   CBC Once 04/23/2020   Comprehensive Metabolic Panel, Fasting Once 04/23/2020   Hemoglobin A1C Once 04/23/2020   Lipid Panel Once 04/23/2020   TSH with Reflex Once 04/23/2020       Next Visit Date:  Future Appointments   Date Time Provider Jazmin Chan   10/29/2020  9:00 AM Pham Colvin, APRN - CNP [id number]            Patient Active Problem List:     Diabetes mellitus 2 with renal manifestations, controlled     Lumbar disc herniation with radiculopathy     Hyperlipidemia     Hypertension     Major depression     CKD (chronic kidney disease) stage 3, GFR 30-59 ml/min ([facility])     Peripheral neuropathy     ARNIE (obstructive sleep apnea)     TMJ arthralgia     GERD (gastroesophageal reflux disease)

## 2020-08-31 ENCOUNTER — HOSPITAL ENCOUNTER (OUTPATIENT)
Age: 60
Setting detail: SPECIMEN
Discharge: HOME | End: 2020-08-31
Payer: COMMERCIAL

## 2020-08-31 LAB
ALBUMIN SERPL-MCNC: 4.3 G/DL (ref 3.5–5.2)
ALBUMIN/GLOBULIN RATIO: 1.7 (ref 1–2.5)
ALP BLD-CCNC: 134 U/L (ref 35–104)
ALT SERPL-CCNC: 32 U/L (ref 5–33)
ANION GAP SERPL CALCULATED.3IONS-SCNC: 12 MMOL/L (ref 9–17)
AST SERPL-CCNC: 26 U/L
BILIRUB SERPL-MCNC: 0.25 MG/DL (ref 0.3–1.2)
BUN BLDV-MCNC: 13 MG/DL (ref 8–23)
BUN/CREAT BLD: ABNORMAL (ref 9–20)
CALCIUM SERPL-MCNC: 9.9 MG/DL (ref 8.6–10.4)
CHLORIDE BLD-SCNC: 101 MMOL/L (ref 98–107)
CHOLESTEROL/HDL RATIO: 6.1
CHOLESTEROL: 264 MG/DL
CO2: 25 MMOL/L (ref 20–31)
CREAT SERPL-MCNC: 0.87 MG/DL (ref 0.5–0.9)
GFR AFRICAN AMERICAN: >60 ML/MIN
GFR NON-AFRICAN AMERICAN: >60 ML/MIN
GFR SERPL CREATININE-BSD FRML MDRD: ABNORMAL ML/MIN/{1.73_M2}
GFR SERPL CREATININE-BSD FRML MDRD: ABNORMAL ML/MIN/{1.73_M2}
GLUCOSE FASTING: 137 MG/DL (ref 70–99)
HCT VFR BLD CALC: 44.6 % (ref 36.3–47.1)
HDLC SERPL-MCNC: 43 MG/DL
HEMOGLOBIN: 14 G/DL (ref 11.9–15.1)
LDL CHOLESTEROL: 179 MG/DL (ref 0–130)
MCH RBC QN AUTO: 28.2 PG (ref 25.2–33.5)
MCHC RBC AUTO-ENTMCNC: 31.4 G/DL (ref 28.4–34.8)
MCV RBC AUTO: 89.7 FL (ref 82.6–102.9)
NRBC AUTOMATED: 0 PER 100 WBC
PDW BLD-RTO: 13.1 % (ref 11.8–14.4)
PLATELET # BLD: 326 K/UL (ref 138–453)
PMV BLD AUTO: 10.9 FL (ref 8.1–13.5)
POTASSIUM SERPL-SCNC: 4.9 MMOL/L (ref 3.7–5.3)
RBC # BLD: 4.97 M/UL (ref 3.95–5.11)
SODIUM BLD-SCNC: 138 MMOL/L (ref 135–144)
TOTAL PROTEIN: 6.8 G/DL (ref 6.4–8.3)
TRIGL SERPL-MCNC: 208 MG/DL
TSH SERPL DL<=0.05 MIU/L-ACNC: 1.9 MIU/L (ref 0.3–5)
VLDLC SERPL CALC-MCNC: ABNORMAL MG/DL (ref 1–30)
WBC # BLD: 8.4 K/UL (ref 3.5–11.3)

## 2020-09-01 LAB
ESTIMATED AVERAGE GLUCOSE: 163 MG/DL
HBA1C MFR BLD: 7.3 % (ref 4–6)

## 2020-09-02 ENCOUNTER — TELEPHONE (OUTPATIENT)
Dept: FAMILY MEDICINE CLINIC | Age: 60
End: 2020-09-02

## 2020-09-02 RX ORDER — ATORVASTATIN CALCIUM 80 MG/1
80 TABLET, FILM COATED ORAL DAILY
Qty: 90 TABLET | Refills: 1 | Status: CANCELLED | OUTPATIENT
Start: 2020-09-02 | End: 2021-03-01

## 2020-10-20 RX ORDER — LISINOPRIL 40 MG/1
TABLET ORAL
Qty: 90 TABLET | Refills: 3 | Status: SHIPPED | OUTPATIENT
Start: 2020-10-20 | End: 2021-09-23 | Stop reason: SDUPTHER

## 2020-10-20 NOTE — TELEPHONE ENCOUNTER
LV 4/23/20  LRF 4/23/20 90 tablets 1 RF  RTO 10/29/20    Health Maintenance   Topic Date Due    HIV screen  06/25/1973    Shingles Vaccine (1 of 2) 06/25/2008    Diabetic foot exam  05/02/2020    Breast cancer screen  07/26/2020    Flu vaccine (1) 09/01/2020    Diabetic retinal exam  05/07/2021    A1C test (Diabetic or Prediabetic)  08/31/2021    Lipid screen  08/31/2021    Potassium monitoring  08/31/2021    Creatinine monitoring  08/31/2021    Colon cancer screen colonoscopy  02/27/2023    DTaP/Tdap/Td vaccine (3 - Td) 09/13/2028    Pneumococcal 0-64 years Vaccine  Completed    Hepatitis C screen  Completed    Hepatitis A vaccine  Aged Out    Hib vaccine  Aged Out    Meningococcal (ACWY) vaccine  Aged Out             (applicable per patient's age: Cancer Screenings, Depression Screening, Fall Risk Screening, Immunizations)    Hemoglobin A1C (%)   Date Value   08/31/2020 7.3 (H)   11/04/2019 6.7 (H)   04/23/2019 6.5 (H)     Microalb/Crt.  Ratio (mcg/mg creat)   Date Value   04/23/2019 5     LDL Cholesterol (mg/dL)   Date Value   08/31/2020 179 (H)     AST (U/L)   Date Value   08/31/2020 26     ALT (U/L)   Date Value   08/31/2020 32     BUN (mg/dL)   Date Value   08/31/2020 13      (goal A1C is < 7)   (goal LDL is <100) need 30-50% reduction from baseline     BP Readings from Last 3 Encounters:   11/27/19 138/86   05/02/19 124/74   10/30/18 134/88    (goal /80)      All Future Testing planned in [facility]:      Next Visit Date:  Future Appointments   Date Time Provider Jazmin Chan   10/29/2020  9:00 AM JORDAN Vallejo - LARISSA [facility]            Patient Active Problem List:     Diabetes mellitus 2 with renal manifestations, controlled     Lumbar disc herniation with radiculopathy     Hyperlipidemia     Hypertension     Major depression     CKD (chronic kidney disease) stage 3, GFR 30-59 ml/min     Peripheral neuropathy     ARNIE (obstructive sleep apnea)     TMJ arthralgia GERD (gastroesophageal reflux disease)

## 2020-10-29 ENCOUNTER — OFFICE VISIT (OUTPATIENT)
Dept: FAMILY MEDICINE CLINIC | Age: 60
End: 2020-10-29
Payer: COMMERCIAL

## 2020-10-29 VITALS
SYSTOLIC BLOOD PRESSURE: 126 MMHG | WEIGHT: 187 LBS | OXYGEN SATURATION: 98 % | TEMPERATURE: 98 F | DIASTOLIC BLOOD PRESSURE: 88 MMHG | HEART RATE: 72 BPM | RESPIRATION RATE: 20 BRPM | BODY MASS INDEX: 32.1 KG/M2

## 2020-10-29 PROCEDURE — 2022F DILAT RTA XM EVC RTNOPTHY: CPT | Performed by: NURSE PRACTITIONER

## 2020-10-29 PROCEDURE — 90686 IIV4 VACC NO PRSV 0.5 ML IM: CPT | Performed by: NURSE PRACTITIONER

## 2020-10-29 PROCEDURE — G8427 DOCREV CUR MEDS BY ELIG CLIN: HCPCS | Performed by: NURSE PRACTITIONER

## 2020-10-29 PROCEDURE — 81003 URINALYSIS AUTO W/O SCOPE: CPT | Performed by: NURSE PRACTITIONER

## 2020-10-29 PROCEDURE — 99214 OFFICE O/P EST MOD 30 MIN: CPT | Performed by: NURSE PRACTITIONER

## 2020-10-29 PROCEDURE — 3051F HG A1C>EQUAL 7.0%<8.0%: CPT | Performed by: NURSE PRACTITIONER

## 2020-10-29 PROCEDURE — 3017F COLORECTAL CA SCREEN DOC REV: CPT | Performed by: NURSE PRACTITIONER

## 2020-10-29 PROCEDURE — G8417 CALC BMI ABV UP PARAM F/U: HCPCS | Performed by: NURSE PRACTITIONER

## 2020-10-29 PROCEDURE — G8482 FLU IMMUNIZE ORDER/ADMIN: HCPCS | Performed by: NURSE PRACTITIONER

## 2020-10-29 PROCEDURE — 90471 IMMUNIZATION ADMIN: CPT | Performed by: NURSE PRACTITIONER

## 2020-10-29 PROCEDURE — 1036F TOBACCO NON-USER: CPT | Performed by: NURSE PRACTITIONER

## 2020-10-29 ASSESSMENT — ENCOUNTER SYMPTOMS
SHORTNESS OF BREATH: 0
CHEST TIGHTNESS: 0
DIARRHEA: 0
COUGH: 0
EYES NEGATIVE: 1
SORE THROAT: 0
ABDOMINAL PAIN: 0
CONSTIPATION: 0
TROUBLE SWALLOWING: 0
SINUS PRESSURE: 0
WHEEZING: 0
NAUSEA: 0
RHINORRHEA: 0
BLOOD IN STOOL: 0
BACK PAIN: 1

## 2020-10-29 ASSESSMENT — VISUAL ACUITY: OU: 1

## 2020-10-29 NOTE — PROGRESS NOTES
[address] [facility]   [address] [address]  [phone]    10/31/2020    Visit Information    Have you changed or started any medications since your last visit including any over-the-counter medicines, vitamins, or herbal medicines? no   Are you having any side effects from any of your medications? -  no  Have you stopped taking any of your medications? Is so, why? -  no    Have you seen any other physician or provider since your last visit? Yes - Records Obtained OGBYN, Ortho  Have you had any other diagnostic tests since your last visit? Yes - Records Obtained Blood work, XR Lumbar   Have you been seen in the emergency room and/or had an admission to a hospital since we last saw you? No  Have you had your routine dental cleaning in the past 6 months? yes -      Have you activated your [employer] account? If not, what are your barriers? Yes     Patient Care Team:  JORDAN Lozano CNP as PCP - General (Nurse Practitioner Family)  [facility] Guard, APRN - CNP as PCP - [facility] Empaneled Provider  Myla Limon MD as Physician (Dermatology)  Nita Villar as Physician (Gynecology)  Chris Carr as Physician (Podiatry)      William Ag is a 56 y.o. female who presents today for her  medical conditions/complaints as noted below. William Ag is c/o of Diabetes; Hypertension; and Fall (Left hip pain radiating to left leg. Onset august. Horse pushed her down. XR Lumbar. )  . HPI:     Patient is a pleasant 49-year-old  female. She presents the office today to follow-up and also to discuss ongoing pain to her left hip. Patient comments that she was pushed/and then fell due to her horse August 2020. Due to patient recently having surgical intervention to her back by Dr. Jalen Zee in February, she went to his office and x-rays were completed. It was noted that the screws were good in place. They had no rationale on why she has such significant pain.   It was noted that She sees a podiatrist.Eye exam is current. Hip Pain    The incident occurred more than 1 week ago. The incident occurred at home. The injury mechanism was a direct blow and a fall. The pain is present in the left hip. The pain is moderate. The pain has been intermittent since onset. Pertinent negatives include no inability to bear weight, loss of sensation, muscle weakness, numbness or tingling. She reports no foreign bodies present. The symptoms are aggravated by weight bearing and movement. Hypertension   This is a chronic problem. The current episode started more than 1 year ago. The problem is controlled. Associated symptoms include malaise/fatigue. Pertinent negatives include no blurred vision, chest pain, headaches, peripheral edema or shortness of breath. Past treatments include ACE inhibitors. There are no compliance problems.         Vitals:    10/29/20 0907   BP: 126/88   Site: Right Upper Arm   Position: Sitting   Cuff Size: Medium Adult   Pulse: 72   Resp: 20   Temp: 98 °F (36.7 °C)   TempSrc: Tympanic   SpO2: 98%   Weight: 187 lb (84.8 kg)      Past Medical History:   Diagnosis Date    Benign paroxysmal vertigo     CKD (chronic kidney disease) stage 3, GFR 30-59 ml/min 2015    Diabetes mellitus, type 2 ([address]     Diverticulosis of colon     DVT of lower extremity (deep venous thrombosis) ([facility] [address] 2011    after lumbar surgery    Esophageal reflux     Hyperlipidemia     Hypertension     Lumbar disc herniation with radiculopathy     Rotator cuff tear     right      Past Surgical History:   Procedure Laterality Date     SECTION  ,    Dossie Anahi GALLBLADDER SURGERY      LUMBAR DISCECTOMY  2011         LUMBAR FUSION      ROTATOR CUFF REPAIR      Right    SPINAL FUSION      THOMPSON AND BSO  2006     Family History   Problem Relation Age of Onset    Other Mother         heart aneurysm    Heart Attack Father     Kidney Cancer Brother         passed away at 59    Stroke Maternal Grandmother     Heart Attack Maternal Grandfather     Asthma Maternal Grandfather     Heart Attack Paternal Grandfather      Social History     Tobacco Use    Smoking status: Never Smoker    Smokeless tobacco: Never Used   Substance Use Topics    Alcohol use: Yes     Alcohol/week: 0.0 standard drinks     Frequency: 2-3 times a week     Drinks per session: 1 or 2     Binge frequency: Never      Current Outpatient Medications   Medication Sig Dispense Refill    lisinopril (PRINIVIL;ZESTRIL) 40 MG tablet TAKE 1 TABLET DAILY 90 tablet 3    metFORMIN (GLUCOPHAGE) 500 MG tablet Take 1 tablet by mouth Daily with supper 90 tablet 1    metFORMIN (GLUCOPHAGE) 1000 MG tablet Take 1 tablet by mouth daily (with breakfast) 90 tablet 1    atorvastatin (LIPITOR) 80 MG tablet Take 1 tablet by mouth daily New dose as discussed 90 tablet 1    omeprazole (PRILOSEC) 20 MG delayed release capsule Take 1 capsule by mouth Daily 90 capsule 1    DULoxetine (CYMBALTA) 60 MG extended release capsule Take 1 capsule by mouth daily 90 capsule 1    Ascorbic Acid (VITAMIN C) 1000 MG tablet Take 1,000 mg by mouth daily      Cholecalciferol (VITAMIN D3) 2000 units CAPS Take 1 capsule by mouth daily 30 capsule 0    DOCUSATE CALCIUM PO Take 1 capsule by mouth daily as needed      naproxen (NAPROSYN) 500 MG tablet Take 1 tablet by mouth daily as needed      aspirin 81 MG EC tablet Take 81 mg by mouth daily. No current facility-administered medications for this visit.       Allergies   Allergen Reactions    Adhesive Tape Other (See Comments)     Blistering and skin lesions     Demerol [Meperidine Hcl] Other (See Comments)     Hallucination     Penicillin G Potassium Nausea Only       Health Maintenance   Topic Date Due    HIV screen  06/25/1973    Shingles Vaccine (1 of 2) 06/25/2008    Diabetic retinal exam  05/07/2021    A1C test (Diabetic or Prediabetic)  08/31/2021    Lipid screen  08/31/2021    Potassium monitoring  08/31/2021    Creatinine monitoring  08/31/2021    Diabetic foot exam  10/29/2021    Breast cancer screen  07/16/2022    Colon cancer screen colonoscopy  02/27/2023    DTaP/Tdap/Td vaccine (3 - Td) 09/13/2028    Flu vaccine  Completed    Pneumococcal 0-64 years Vaccine  Completed    Hepatitis C screen  Completed    Hepatitis A vaccine  Aged Out    Hib vaccine  Aged Out    Meningococcal (ACWY) vaccine  Aged Out       Subjective:      Review of Systems   Constitutional: Positive for activity change and malaise/fatigue. Negative for appetite change, chills, fatigue and fever. HENT: Negative for congestion, ear pain, postnasal drip, rhinorrhea, sinus pressure, sneezing, sore throat and trouble swallowing. Eyes: Negative. Negative for blurred vision. Respiratory: Negative for cough, chest tightness, shortness of breath and wheezing. Cardiovascular: Negative for chest pain and leg swelling. Gastrointestinal: Negative for abdominal pain, blood in stool, constipation, diarrhea and nausea. Endocrine: Negative. Genitourinary: Negative for difficulty urinating, dysuria, frequency, hematuria and urgency. Musculoskeletal: Positive for arthralgias (left hip ) and back pain (chronic ). Negative for gait problem, joint swelling and myalgias. Skin: Negative for rash and wound. Allergic/Immunologic: Negative for environmental allergies and food allergies. Neurological: Negative for dizziness, tingling, syncope, light-headedness, numbness and headaches. Hematological: Negative. Psychiatric/Behavioral: Positive for sleep disturbance. Negative for agitation, decreased concentration, self-injury and suicidal ideas. The patient is not nervous/anxious. Objective:     Physical Exam  Vitals signs and nursing note reviewed. Constitutional:       General: She is not in acute distress. Appearance: Normal appearance. She is well-developed and well-groomed.  She is not General: There is no distension. Palpations: Abdomen is soft. Tenderness: There is no abdominal tenderness. Musculoskeletal:      Left hip: She exhibits decreased range of motion, decreased strength and tenderness. She exhibits no swelling, no crepitus and no deformity. Lumbar back: She exhibits decreased range of motion. She exhibits no tenderness. Right lower leg: No edema. Left lower leg: No edema. Comments: Limited active and passive range of motion. Feet:      Right foot:      Protective Sensation: 10 sites tested. 8 sites sensed. Skin integrity: Callus present. Toenail Condition: Right toenails are normal.      Left foot:      Protective Sensation: 10 sites tested. 8 sites sensed. Skin integrity: Callus present. Toenail Condition: Left toenails are normal.   Lymphadenopathy:      Cervical: No cervical adenopathy. Skin:     General: Skin is warm and dry. Neurological:      General: No focal deficit present. Mental Status: She is alert and oriented to person, place, and time. Motor: Motor function is intact. Coordination: Coordination is intact. Gait: Gait is intact. Psychiatric:         Attention and Perception: Attention and perception normal.         Mood and Affect: Mood and affect normal.         Speech: Speech normal.         Behavior: Behavior normal. Behavior is cooperative. Thought Content: Thought content normal.         Cognition and Memory: Cognition and memory normal.         Judgment: Judgment normal.          Assessment:      1. Acute pain of left hip  -Worsening   - MRI HIP LEFT WO CONTRAST; Future    2. Essential hypertension  -Stable, medicated, monitered   - CBC; Future  - Lipid Panel; Future  - Vitamin D 25 Hydroxy; Future  - Vitamin B12 & Folate; Future  Continue lisinopril    3.  Controlled type 2 diabetes mellitus with stage 3 chronic kidney disease, without long-term current use of insulin ([facility] [address]  -Stable, medicated, monitered   - Comprehensive Metabolic Panel, Fasting; Future  - Hemoglobin A1C; Future  - Urinalysis; Future  - HM DIABETES FOOT EXAM  Continue Metformin    4. ARNIE (obstructive sleep apnea)  -Stable, monitered  Continue her mouthguard and evenings    5. Need for influenza vaccination  - INFLUENZA, QUADV, 3 YRS AND OLDER, IM PF, PREFILL SYR OR SDV, 0.5ML (AFLURIA QUADV, PF)       Plan:      Return in about 6 months (around 4/29/2021). Orders Placed This Encounter   Procedures    MRI HIP LEFT WO CONTRAST     Standing Status:   Future     Standing Expiration Date:   10/29/2021     Order Specific Question:   Reason for exam:     Answer:   recent fall, injury to horse    INFLUENZA, QUADV, 3 YRS AND OLDER, IM PF, PREFILL SYR OR SDV, 0.5ML (AFLURIA QUADV, PF)    CBC     Standing Status:   Future     Standing Expiration Date:   10/29/2021    Comprehensive Metabolic Panel, Fasting     Standing Status:   Future     Standing Expiration Date:   10/29/2021    Hemoglobin A1C     Standing Status:   Future     Standing Expiration Date:   10/29/2021    Lipid Panel     Standing Status:   Future     Standing Expiration Date:   10/29/2021     Order Specific Question:   Is Patient Fasting?/# of Hours     Answer:   yes    Urinalysis     Standing Status:   Future     Standing Expiration Date:   10/29/2021    Vitamin D 25 Hydroxy     Standing Status:   Future     Standing Expiration Date:   10/29/2021    Vitamin B12 & Folate     Standing Status:   Future     Standing Expiration Date:   10/29/2021    HM DIABETES FOOT EXAM     No orders of the defined types were placed in this encounter. Reviewed health maintenance, prior labs and imaging. Patient given educational materials - see patient instructions. Discussed use, benefit, and side effects of prescribed medications. Barriers to medication compliance addressed. All patient questions answered. Pt voiced understanding to plan of care.

## 2020-10-31 PROBLEM — M25.552 ACUTE PAIN OF LEFT HIP: Status: ACTIVE | Noted: 2020-10-31

## 2020-10-31 ASSESSMENT — ENCOUNTER SYMPTOMS
BLURRED VISION: 0
VISUAL CHANGE: 0

## 2020-11-23 RX ORDER — DULOXETIN HYDROCHLORIDE 60 MG/1
60 CAPSULE, DELAYED RELEASE ORAL DAILY
Qty: 90 CAPSULE | Refills: 1 | Status: SHIPPED | OUTPATIENT
Start: 2020-11-23 | End: 2021-05-24

## 2020-11-23 NOTE — TELEPHONE ENCOUNTER
Last visit: 10/29/20  Last Med refill: 5/26/20  Does patient have enough medication for 72 hours: Yes    Next Visit Date:  Future Appointments   Date Time Provider Jazmin Chan   4/28/2021  9:30 AM JORDAN Hartley - CNP [facility] [address] Maintenance   Topic Date Due    HIV screen  06/25/1973    Shingles Vaccine (1 of 2) 06/25/2008    Diabetic retinal exam  05/07/2021    A1C test (Diabetic or Prediabetic)  08/31/2021    Lipid screen  08/31/2021    Potassium monitoring  08/31/2021    Creatinine monitoring  08/31/2021    Diabetic foot exam  10/29/2021    Breast cancer screen  07/16/2022    Colon cancer screen colonoscopy  02/27/2023    DTaP/Tdap/Td vaccine (3 - Td) 09/13/2028    Flu vaccine  Completed    Hepatitis C screen  Completed    Hepatitis A vaccine  Aged Out    Hib vaccine  Aged Out    Meningococcal (ACWY) vaccine  Aged Out    Pneumococcal 0-64 years Vaccine  Aged Out       Hemoglobin A1C (%)   Date Value   08/31/2020 7.3 (H)   11/04/2019 6.7 (H)   04/23/2019 6.5 (H)             ( goal A1C is < 7)   Microalb/Crt.  Ratio (mcg/mg creat)   Date Value   04/23/2019 5     LDL Cholesterol (mg/dL)   Date Value   08/31/2020 179 (H)   11/04/2019 107       (goal LDL is <100)   AST (U/L)   Date Value   08/31/2020 26     ALT (U/L)   Date Value   08/31/2020 32     BUN (mg/dL)   Date Value   08/31/2020 13     BP Readings from Last 3 Encounters:   10/29/20 126/88   11/27/19 138/86   05/02/19 124/74          (goal 120/80)    All Future Testing planned in [facility]  Lab Frequency Next Occurrence   MRI HIP LEFT WO CONTRAST Once 10/29/2020   CBC Once 10/29/2020   Comprehensive Metabolic Panel, Fasting Once 10/29/2020   Hemoglobin A1C Once 10/29/2020   Lipid Panel Once 10/29/2020   Urinalysis Once 10/29/2020   Vitamin D 25 Hydroxy Once 10/29/2020   Vitamin B12 & Folate Once 10/29/2020               Patient Active Problem List:     Diabetes mellitus 2 with renal manifestations, controlled Lumbar disc herniation with radiculopathy     Hyperlipidemia     Major depression     Peripheral neuropathy     ARNIE (obstructive sleep apnea)     TMJ arthralgia     GERD (gastroesophageal reflux disease)     Acute pain of left hip

## 2021-01-03 DIAGNOSIS — K21.9 GASTROESOPHAGEAL REFLUX DISEASE: ICD-10-CM

## 2021-01-04 RX ORDER — OMEPRAZOLE 20 MG/1
CAPSULE, DELAYED RELEASE ORAL
Qty: 90 CAPSULE | Refills: 3 | Status: SHIPPED | OUTPATIENT
Start: 2021-01-04 | End: 2021-12-29

## 2021-01-04 NOTE — TELEPHONE ENCOUNTER
Last visit: 10/29/20  Last Med refill: 7/7/20  Does patient have enough medication for 72 hours: Yes    Next Visit Date:  Future Appointments   Date Time Provider Jazmin Chan   4/28/2021  9:30 AM JORDAN Campos - CNP [facility] [address] Maintenance   Topic Date Due    HIV screen  06/25/1973    Shingles Vaccine (1 of 2) 06/25/2008    Diabetic retinal exam  05/07/2021    A1C test (Diabetic or Prediabetic)  08/31/2021    Lipid screen  08/31/2021    Potassium monitoring  08/31/2021    Creatinine monitoring  08/31/2021    Diabetic foot exam  10/29/2021    Breast cancer screen  07/16/2022    Colon cancer screen colonoscopy  02/27/2023    DTaP/Tdap/Td vaccine (3 - Td) 09/13/2028    Flu vaccine  Completed    Pneumococcal 0-64 years Vaccine  Completed    Hepatitis C screen  Completed    Hepatitis A vaccine  Aged Out    Hib vaccine  Aged Out    Meningococcal (ACWY) vaccine  Aged Out       Hemoglobin A1C (%)   Date Value   08/31/2020 7.3 (H)   11/04/2019 6.7 (H)   04/23/2019 6.5 (H)             ( goal A1C is < 7)   Microalb/Crt.  Ratio (mcg/mg creat)   Date Value   04/23/2019 5     LDL Cholesterol (mg/dL)   Date Value   08/31/2020 179 (H)   11/04/2019 107       (goal LDL is <100)   AST (U/L)   Date Value   08/31/2020 26     ALT (U/L)   Date Value   08/31/2020 32     BUN (mg/dL)   Date Value   08/31/2020 13     BP Readings from Last 3 Encounters:   10/29/20 126/88   11/27/19 138/86   05/02/19 124/74          (goal 120/80)    All Future Testing planned in [facility]  Lab Frequency Next Occurrence   MRI HIP LEFT WO CONTRAST Once 10/29/2020   CBC Once 10/29/2020   Comprehensive Metabolic Panel, Fasting Once 10/29/2020   Hemoglobin A1C Once 10/29/2020   Lipid Panel Once 10/29/2020   Urinalysis Once 10/29/2020   Vitamin D 25 Hydroxy Once 10/29/2020   Vitamin B12 & Folate Once 10/29/2020               Patient Active Problem List:     Diabetes mellitus 2 with renal manifestations, controlled Lumbar disc herniation with radiculopathy     Hyperlipidemia     Major depression     Peripheral neuropathy     ARNIE (obstructive sleep apnea)     TMJ arthralgia     GERD (gastroesophageal reflux disease)     Acute pain of left hip

## 2021-01-07 DIAGNOSIS — E78.00 PURE HYPERCHOLESTEROLEMIA: ICD-10-CM

## 2021-01-07 RX ORDER — ATORVASTATIN CALCIUM 80 MG/1
80 TABLET, FILM COATED ORAL DAILY
Qty: 90 TABLET | Refills: 1 | Status: SHIPPED | OUTPATIENT
Start: 2021-01-07 | End: 2021-08-30 | Stop reason: SDUPTHER

## 2021-01-07 NOTE — TELEPHONE ENCOUNTER
LOV 10/29/20  RTO 6 months; F/U scheduled  LRF 8/27/20    Health Maintenance   Topic Date Due    HIV screen  06/25/1973    Shingles Vaccine (1 of 2) 06/25/2008    Diabetic retinal exam  05/07/2021    A1C test (Diabetic or Prediabetic)  08/31/2021    Lipid screen  08/31/2021    Potassium monitoring  08/31/2021    Creatinine monitoring  08/31/2021    Diabetic foot exam  10/29/2021    Breast cancer screen  07/16/2022    Colon cancer screen colonoscopy  02/27/2023    DTaP/Tdap/Td vaccine (3 - Td) 09/13/2028    Flu vaccine  Completed    Pneumococcal 0-64 years Vaccine  Completed    Hepatitis C screen  Completed    Hepatitis A vaccine  Aged Out    Hib vaccine  Aged Out    Meningococcal (ACWY) vaccine  Aged Out             (applicable per patient's age: Cancer Screenings, Depression Screening, Fall Risk Screening, Immunizations)    Hemoglobin A1C (%)   Date Value   08/31/2020 7.3 (H)   11/04/2019 6.7 (H)   04/23/2019 6.5 (H)     Microalb/Crt.  Ratio (mcg/mg creat)   Date Value   04/23/2019 5     LDL Cholesterol (mg/dL)   Date Value   08/31/2020 179 (H)     AST (U/L)   Date Value   08/31/2020 26     ALT (U/L)   Date Value   08/31/2020 32     BUN (mg/dL)   Date Value   08/31/2020 13      (goal A1C is < 7)   (goal LDL is <100) need 30-50% reduction from baseline     BP Readings from Last 3 Encounters:   10/29/20 126/88   11/27/19 138/86   05/02/19 124/74    (goal /80)      All Future Testing planned in [facility]:  Lab Frequency Next Occurrence   MRI HIP LEFT WO CONTRAST Once 10/29/2020   CBC Once 10/29/2020   Comprehensive Metabolic Panel, Fasting Once 10/29/2020   Hemoglobin A1C Once 10/29/2020   Lipid Panel Once 10/29/2020   Urinalysis Once 10/29/2020   Vitamin D 25 Hydroxy Once 10/29/2020   Vitamin B12 & Folate Once 10/29/2020       Next Visit Date:  Future Appointments   Date Time Provider Jazmin Chan   4/28/2021  9:30 AM Pham Jarquin Candido, APRN - [facility]            Patient Active Problem List:     Diabetes mellitus 2 with renal manifestations, controlled     Lumbar disc herniation with radiculopathy     Hyperlipidemia     Major depression     Peripheral neuropathy     ARNIE (obstructive sleep apnea)     TMJ arthralgia     GERD (gastroesophageal reflux disease)     Acute pain of left hip

## 2021-02-11 DIAGNOSIS — E11.22 CONTROLLED TYPE 2 DIABETES MELLITUS WITH STAGE 3 CHRONIC KIDNEY DISEASE, WITHOUT LONG-TERM CURRENT USE OF INSULIN (HCC): ICD-10-CM

## 2021-02-11 DIAGNOSIS — N18.30 CONTROLLED TYPE 2 DIABETES MELLITUS WITH STAGE 3 CHRONIC KIDNEY DISEASE, WITHOUT LONG-TERM CURRENT USE OF INSULIN (HCC): ICD-10-CM

## 2021-02-11 NOTE — TELEPHONE ENCOUNTER
LOV 10-29-20  LRF 9-2-20    Health Maintenance   Topic Date Due    HIV screen  06/25/1973    Shingles Vaccine (1 of 2) 06/25/2008    Diabetic retinal exam  05/07/2021    A1C test (Diabetic or Prediabetic)  08/31/2021    Lipid screen  08/31/2021    Potassium monitoring  08/31/2021    Creatinine monitoring  08/31/2021    Diabetic foot exam  10/29/2021    Breast cancer screen  07/16/2022    Colon cancer screen colonoscopy  02/27/2023    DTaP/Tdap/Td vaccine (3 - Td) 09/13/2028    Flu vaccine  Completed    Pneumococcal 0-64 years Vaccine  Completed    Hepatitis C screen  Completed    Hepatitis A vaccine  Aged Out    Hib vaccine  Aged Out    Meningococcal (ACWY) vaccine  Aged Out             (applicable per patient's age: Cancer Screenings, Depression Screening, Fall Risk Screening, Immunizations)    Hemoglobin A1C (%)   Date Value   08/31/2020 7.3 (H)   11/04/2019 6.7 (H)   04/23/2019 6.5 (H)     Microalb/Crt.  Ratio (mcg/mg creat)   Date Value   04/23/2019 5     LDL Cholesterol (mg/dL)   Date Value   08/31/2020 179 (H)     AST (U/L)   Date Value   08/31/2020 26     ALT (U/L)   Date Value   08/31/2020 32     BUN (mg/dL)   Date Value   08/31/2020 13      (goal A1C is < 7)   (goal LDL is <100) need 30-50% reduction from baseline     BP Readings from Last 3 Encounters:   10/29/20 126/88   11/27/19 138/86   05/02/19 124/74    (goal /80)      All Future Testing planned in [facility]:  Lab Frequency Next Occurrence   MRI HIP LEFT WO CONTRAST Once 10/29/2020   CBC Once 10/29/2020   Comprehensive Metabolic Panel, Fasting Once 10/29/2020   Hemoglobin A1C Once 10/29/2020   Lipid Panel Once 10/29/2020   Urinalysis Once 10/29/2020   Vitamin D 25 Hydroxy Once 10/29/2020   Vitamin B12 & Folate Once 10/29/2020       Next Visit Date:  Future Appointments   Date Time Provider Jazmin Chan   4/28/2021  9:30 AM Pham Clayton APRN - [facility]            Patient Active Problem List:     Diabetes mellitus 2 with renal manifestations, controlled     Lumbar disc herniation with radiculopathy     Hyperlipidemia     Major depression     Peripheral neuropathy     ARNIE (obstructive sleep apnea)     TMJ arthralgia     GERD (gastroesophageal reflux disease)     Acute pain of left hip

## 2021-03-17 DIAGNOSIS — N18.30 CONTROLLED TYPE 2 DIABETES MELLITUS WITH STAGE 3 CHRONIC KIDNEY DISEASE, WITHOUT LONG-TERM CURRENT USE OF INSULIN (HCC): ICD-10-CM

## 2021-03-17 DIAGNOSIS — E11.22 CONTROLLED TYPE 2 DIABETES MELLITUS WITH STAGE 3 CHRONIC KIDNEY DISEASE, WITHOUT LONG-TERM CURRENT USE OF INSULIN (HCC): ICD-10-CM

## 2021-03-17 NOTE — TELEPHONE ENCOUNTER
Last visit: 10/29/20  Last Med refill: 2/12/21    Next Visit Date:  Future Appointments   Date Time Provider Jazmin Chan   4/28/2021  9:30 AM Pham Courtney [place], APRN - CNP [facility]       Health Maintenance   Topic Date Due    HIV screen  Never done    COVID-19 Vaccine (1) Never done    Shingles Vaccine (1 of 2) Never done    Diabetic retinal exam  05/07/2021    A1C test (Diabetic or Prediabetic)  08/31/2021    Lipid screen  08/31/2021    Potassium monitoring  08/31/2021    Creatinine monitoring  08/31/2021    Diabetic foot exam  10/29/2021    Breast cancer screen  07/16/2022    Colon cancer screen colonoscopy  02/27/2023    DTaP/Tdap/Td vaccine (3 - Td) 09/13/2028    Flu vaccine  Completed    Pneumococcal 0-64 years Vaccine  Completed    Hepatitis C screen  Completed    Hepatitis A vaccine  Aged Out    Hib vaccine  Aged Out    Meningococcal (ACWY) vaccine  Aged Out       Hemoglobin A1C (%)   Date Value   08/31/2020 7.3 (H)   11/04/2019 6.7 (H)   04/23/2019 6.5 (H)             ( goal A1C is < 7)   Microalb/Crt.  Ratio (mcg/mg creat)   Date Value   04/23/2019 5     LDL Cholesterol (mg/dL)   Date Value   08/31/2020 179 (H)   11/04/2019 107       (goal LDL is <100)   AST (U/L)   Date Value   08/31/2020 26     ALT (U/L)   Date Value   08/31/2020 32     BUN (mg/dL)   Date Value   08/31/2020 13     BP Readings from Last 3 Encounters:   10/29/20 126/88   11/27/19 138/86   05/02/19 124/74          (goal 120/80)    All Future Testing planned in [facility]  Lab Frequency Next Occurrence   MRI HIP LEFT WO CONTRAST Once 10/29/2020   CBC Once 10/29/2020   Comprehensive Metabolic Panel, Fasting Once 10/29/2020   Hemoglobin A1C Once 10/29/2020   Lipid Panel Once 10/29/2020   Urinalysis Once 10/29/2020   Vitamin D 25 Hydroxy Once 10/29/2020   Vitamin B12 & Folate Once 10/29/2020               Patient Active Problem List:     Diabetes mellitus 2 with renal manifestations, controlled     Lumbar disc herniation with radiculopathy     Hyperlipidemia     Major depression     Peripheral neuropathy     ARNIE (obstructive sleep apnea)     TMJ arthralgia     GERD (gastroesophageal reflux disease)     Acute pain of left hip

## 2021-04-26 ENCOUNTER — HOSPITAL ENCOUNTER (OUTPATIENT)
Age: 61
Setting detail: SPECIMEN
Discharge: HOME | End: 2021-04-26
Payer: COMMERCIAL

## 2021-04-26 DIAGNOSIS — N18.30 CONTROLLED TYPE 2 DIABETES MELLITUS WITH STAGE 3 CHRONIC KIDNEY DISEASE, WITHOUT LONG-TERM CURRENT USE OF INSULIN (HCC): ICD-10-CM

## 2021-04-26 DIAGNOSIS — E11.22 CONTROLLED TYPE 2 DIABETES MELLITUS WITH STAGE 3 CHRONIC KIDNEY DISEASE, WITHOUT LONG-TERM CURRENT USE OF INSULIN (HCC): ICD-10-CM

## 2021-04-26 DIAGNOSIS — I10 ESSENTIAL HYPERTENSION: ICD-10-CM

## 2021-04-26 LAB
ALBUMIN SERPL-MCNC: 4.3 G/DL (ref 3.5–5.2)
ALBUMIN/GLOBULIN RATIO: 1.6 (ref 1–2.5)
ALP BLD-CCNC: 110 U/L (ref 35–104)
ALT SERPL-CCNC: 17 U/L (ref 5–33)
ANION GAP SERPL CALCULATED.3IONS-SCNC: 9 MMOL/L (ref 9–17)
AST SERPL-CCNC: 17 U/L
BILIRUB SERPL-MCNC: 0.45 MG/DL (ref 0.3–1.2)
BILIRUBIN URINE: NEGATIVE
BUN BLDV-MCNC: 14 MG/DL (ref 8–23)
BUN/CREAT BLD: ABNORMAL (ref 9–20)
CALCIUM SERPL-MCNC: 9.8 MG/DL (ref 8.6–10.4)
CHLORIDE BLD-SCNC: 104 MMOL/L (ref 98–107)
CHOLESTEROL/HDL RATIO: 3.6
CHOLESTEROL: 182 MG/DL
CO2: 26 MMOL/L (ref 20–31)
COLOR: YELLOW
COMMENT UA: NORMAL
CREAT SERPL-MCNC: 0.74 MG/DL (ref 0.5–0.9)
ESTIMATED AVERAGE GLUCOSE: 131 MG/DL
FOLATE: 12.3 NG/ML
GFR AFRICAN AMERICAN: >60 ML/MIN
GFR NON-AFRICAN AMERICAN: >60 ML/MIN
GFR SERPL CREATININE-BSD FRML MDRD: ABNORMAL ML/MIN/{1.73_M2}
GFR SERPL CREATININE-BSD FRML MDRD: ABNORMAL ML/MIN/{1.73_M2}
GLUCOSE FASTING: 128 MG/DL (ref 70–99)
GLUCOSE URINE: NEGATIVE
HBA1C MFR BLD: 6.2 % (ref 4–6)
HCT VFR BLD CALC: 40.8 % (ref 36.3–47.1)
HDLC SERPL-MCNC: 51 MG/DL
HEMOGLOBIN: 13.3 G/DL (ref 11.9–15.1)
KETONES, URINE: NEGATIVE
LDL CHOLESTEROL: 106 MG/DL (ref 0–130)
LEUKOCYTE ESTERASE, URINE: NEGATIVE
MCH RBC QN AUTO: 29 PG (ref 25.2–33.5)
MCHC RBC AUTO-ENTMCNC: 32.6 G/DL (ref 28.4–34.8)
MCV RBC AUTO: 89.1 FL (ref 82.6–102.9)
NITRITE, URINE: NEGATIVE
NRBC AUTOMATED: 0 PER 100 WBC
PDW BLD-RTO: 12.7 % (ref 11.8–14.4)
PH UA: 6.5 (ref 5–8)
PLATELET # BLD: 304 K/UL (ref 138–453)
PMV BLD AUTO: 10.6 FL (ref 8.1–13.5)
POTASSIUM SERPL-SCNC: 4.2 MMOL/L (ref 3.7–5.3)
PROTEIN UA: NEGATIVE
RBC # BLD: 4.58 M/UL (ref 3.95–5.11)
SODIUM BLD-SCNC: 139 MMOL/L (ref 135–144)
SPECIFIC GRAVITY UA: 1.02 (ref 1–1.03)
TOTAL PROTEIN: 7 G/DL (ref 6.4–8.3)
TRIGL SERPL-MCNC: 127 MG/DL
TURBIDITY: CLEAR
URINE HGB: NEGATIVE
UROBILINOGEN, URINE: NORMAL
VITAMIN B-12: 460 PG/ML (ref 232–1245)
VITAMIN D 25-HYDROXY: 28.8 NG/ML (ref 30–100)
VLDLC SERPL CALC-MCNC: NORMAL MG/DL (ref 1–30)
WBC # BLD: 7.6 K/UL (ref 3.5–11.3)

## 2021-04-28 ENCOUNTER — INITIAL CONSULT (OUTPATIENT)
Dept: FAMILY MEDICINE CLINIC | Age: 61
End: 2021-04-28
Payer: COMMERCIAL

## 2021-04-28 VITALS
DIASTOLIC BLOOD PRESSURE: 84 MMHG | RESPIRATION RATE: 18 BRPM | OXYGEN SATURATION: 98 % | BODY MASS INDEX: 29.28 KG/M2 | WEIGHT: 170.6 LBS | TEMPERATURE: 98 F | HEART RATE: 72 BPM | SYSTOLIC BLOOD PRESSURE: 118 MMHG

## 2021-04-28 DIAGNOSIS — F41.9 ANXIETY: ICD-10-CM

## 2021-04-28 DIAGNOSIS — F51.04 PSYCHOPHYSIOLOGICAL INSOMNIA: ICD-10-CM

## 2021-04-28 DIAGNOSIS — M25.552 ACUTE PAIN OF LEFT HIP: Primary | ICD-10-CM

## 2021-04-28 PROCEDURE — 1036F TOBACCO NON-USER: CPT | Performed by: NURSE PRACTITIONER

## 2021-04-28 PROCEDURE — G8417 CALC BMI ABV UP PARAM F/U: HCPCS | Performed by: NURSE PRACTITIONER

## 2021-04-28 PROCEDURE — 99214 OFFICE O/P EST MOD 30 MIN: CPT | Performed by: NURSE PRACTITIONER

## 2021-04-28 PROCEDURE — 3017F COLORECTAL CA SCREEN DOC REV: CPT | Performed by: NURSE PRACTITIONER

## 2021-04-28 PROCEDURE — G8427 DOCREV CUR MEDS BY ELIG CLIN: HCPCS | Performed by: NURSE PRACTITIONER

## 2021-04-28 RX ORDER — HYDROXYZINE HYDROCHLORIDE 10 MG/1
10 TABLET, FILM COATED ORAL NIGHTLY PRN
Qty: 30 TABLET | Refills: 0 | Status: SHIPPED | OUTPATIENT
Start: 2021-04-28 | End: 2021-05-28

## 2021-04-28 ASSESSMENT — ENCOUNTER SYMPTOMS
SINUS PRESSURE: 0
SORE THROAT: 0
BACK PAIN: 1
ABDOMINAL PAIN: 0
COUGH: 0
NAUSEA: 0
DIARRHEA: 0
WHEEZING: 0
CHEST TIGHTNESS: 0
CONSTIPATION: 0
RHINORRHEA: 0
BLOOD IN STOOL: 0
SHORTNESS OF BREATH: 0
TROUBLE SWALLOWING: 0

## 2021-04-28 NOTE — PATIENT INSTRUCTIONS
Patient Education        hydroxyzine  Pronunciation:  william DROX ee zeen  Brand:  Vistaril  What is the most important information I should know about hydroxyzine? You should not use hydroxyzine if you are pregnant, especially during the first or second trimester. Hydroxyzine can cause a serious heart problem, especially if you use certain medicines at the same time. Tell your doctor about all your current medicines and any you start or stop using. What is hydroxyzine? Hydroxyzine reduces activity in the central nervous system. It also acts as an antihistamine that reduces the effects of natural chemical histamine in the body. Histamine can produce symptoms of itching, or hives on the skin. Hydroxyzine is used as a sedative to treat anxiety and tension. It is also used together with other medications given during and after general anesthesia. Hydroxyzine is also used to treat allergic skin reactions such as hives or contact dermatitis. Hydroxyzine may also be used for purposes not listed in this medication guide. What should I discuss with my healthcare provider before taking hydroxyzine? You should not use hydroxyzine if you are allergic to it, or if:  · you have long QT syndrome;  · you are allergic to cetirizine (Zyrtec) or levocetirizine (Xyzal); or  · you are in the first trimester of pregnancy. You should not use hydroxyzine if you are pregnant, especially during the first or second trimester. Hydroxyzine could harm the unborn baby or cause birth defects. Use effective birth control to prevent pregnancy while you are using this medicine.   To make sure hydroxyzine is safe for you, tell your doctor if you have:  · blockage in your digestive tract (stomach or intestines);  · bladder obstruction or other urination problems;  · glaucoma;  · heart disease, slow heartbeats;  · personal or family history of long QT syndrome;  · an electrolyte imbalance (such as high or low levels of potassium in your blood);  · if you have recently had a heart attack. It is not known whether hydroxyzine passes into breast milk or if it could harm a nursing baby. You should not breast-feed while using this medicine. Do not give this medicine to a child without medical advice. How should I take hydroxyzine? Follow all directions on your prescription label. Your doctor may occasionally change your dose. Do not use this medicine in larger or smaller amounts or for longer than recommended. Shake the oral suspension (liquid) well just before you measure a dose. Measure liquid medicine with the dosing syringe provided, or with a special dose-measuring spoon or medicine cup. If you do not have a dose-measuring device, ask your pharmacist for one. Hydroxyzine is for short-term use only. You should not take this medicine for longer than 4 months. Call your doctor if your anxiety symptoms do not improve, or if they get worse. Store at room temperature away from moisture and heat. What happens if I miss a dose? Take the missed dose as soon as you remember. Skip the missed dose if it is almost time for your next scheduled dose. Do not take extra medicine to make up the missed dose. What happens if I overdose? Seek emergency medical attention or call the Poison Help line at 1-187.409.3659. Overdose symptoms may include severe drowsiness, nausea, vomiting, uncontrolled muscle movements, or seizure (convulsions). What should I avoid while taking hydroxyzine? This medicine may impair your thinking or reactions. Be careful if you drive or do anything that requires you to be alert. Drinking alcohol with this medicine can cause side effects. What are the possible side effects of hydroxyzine? Get emergency medical help if you have signs of an allergic reaction:  hives; difficult breathing; swelling of your face, lips, tongue, or throat. In rare cases, hydroxyzine may cause a severe skin reaction.  Stop taking this medicine and call information provided by [address] is accurate, up-to-date, and complete, but no guarantee is made to that effect. Drug information contained herein may be time sensitive. [facility] information has been compiled for use by healthcare practitioners and consumers in the [place] and therefore [facility] does not warrant that uses outside of the [place] are appropriate, unless specifically indicated otherwise. [facility]'s drug information does not endorse drugs, diagnose patients or recommend therapy. [facility]'s drug information is an informational resource designed to assist licensed healthcare practitioners in caring for their patients and/or to serve consumers viewing this service as a supplement to, and not a substitute for, the expertise, skill, knowledge and judgment of healthcare practitioners. The absence of a warning for a given drug or drug combination in no way should be construed to indicate that the drug or drug combination is safe, effective or appropriate for any given patient. [facility] does not assume any responsibility for any aspect of healthcare administered with the aid of information [facility] provides. The information contained herein is not intended to cover all possible uses, directions, precautions, warnings, drug interactions, allergic reactions, or adverse effects. If you have questions about the drugs you are taking, check with your doctor, nurse or pharmacist.  Copyright 7549-5365 [address]. Version: 8.01. Revision date: 3/15/2017. Care instructions adapted under license by [facility] ([facility]). If you have questions about a medical condition or this instruction, always ask your healthcare professional. [place] [address] any warranty or liability for your use of this information.

## 2021-04-28 NOTE — PROGRESS NOTES
well as insomnia. Patient strongly encouraged to make follow-up appointment with her established psychologist.      Review of Systems  Review of Systems   Constitutional: Positive for activity change (increased, new job ) and fatigue. Negative for appetite change, chills and fever. HENT: Negative for congestion, ear pain, postnasal drip, rhinorrhea, sinus pressure, sneezing, sore throat and trouble swallowing. Respiratory: Negative for cough, chest tightness, shortness of breath and wheezing. ARNIE, no CPAP, wears mouth guard    Cardiovascular: Negative for chest pain, palpitations and leg swelling. Gastrointestinal: Negative for abdominal pain, blood in stool, constipation, diarrhea (loose ) and nausea. Hx Diverticulosis. Upper and lower scopes in past     Genitourinary: Negative for difficulty urinating, dysuria, frequency, hematuria and urgency. Musculoskeletal: Positive for arthralgias (left hip, left hip ) and back pain (chronic ). Negative for gait problem, joint swelling and myalgias. Wears knee brace, OTC    Skin: Negative for rash and wound. Allergic/Immunologic: Positive for environmental allergies. Negative for food allergies. Neurological: Negative for dizziness, syncope, light-headedness, numbness and headaches. Psychiatric/Behavioral: Positive for agitation and sleep disturbance. Negative for decreased concentration, self-injury and suicidal ideas. The patient is nervous/anxious. Physical exam   Physical Exam  Vitals signs and nursing note reviewed. Constitutional:       General: She is not in acute distress. Appearance: Normal appearance. She is well-developed, well-groomed and overweight. She is not ill-appearing or toxic-appearing. Cardiovascular:      Rate and Rhythm: Normal rate and regular rhythm. No extrasystoles are present. Pulses:           Radial pulses are 2+ on the right side and 2+ on the left side.       Heart sounds: Normal heart

## 2021-05-03 ENCOUNTER — HOSPITAL ENCOUNTER (OUTPATIENT)
Dept: GENERAL RADIOLOGY | Facility: CLINIC | Age: 61
Discharge: HOME | End: 2021-05-05
Payer: COMMERCIAL

## 2021-05-03 ENCOUNTER — HOSPITAL ENCOUNTER (OUTPATIENT)
Facility: CLINIC | Age: 61
Discharge: HOME | End: 2021-05-05
Payer: COMMERCIAL

## 2021-05-03 DIAGNOSIS — M25.552 ACUTE PAIN OF LEFT HIP: ICD-10-CM

## 2021-05-03 PROCEDURE — 73562 X-RAY EXAM OF KNEE 3: CPT

## 2021-05-03 PROCEDURE — 73502 X-RAY EXAM HIP UNI 2-3 VIEWS: CPT

## 2021-05-05 ENCOUNTER — TELEPHONE (OUTPATIENT)
Dept: FAMILY MEDICINE CLINIC | Age: 61
End: 2021-05-05

## 2021-05-05 DIAGNOSIS — M25.552 ACUTE PAIN OF LEFT HIP: Primary | ICD-10-CM

## 2021-05-22 DIAGNOSIS — F33.1 MODERATE EPISODE OF RECURRENT MAJOR DEPRESSIVE DISORDER (HCC): ICD-10-CM

## 2021-05-24 RX ORDER — DULOXETIN HYDROCHLORIDE 60 MG/1
CAPSULE, DELAYED RELEASE ORAL
Qty: 90 CAPSULE | Refills: 1 | Status: SHIPPED | OUTPATIENT
Start: 2021-05-24 | End: 2021-09-23 | Stop reason: SDUPTHER

## 2021-05-24 NOTE — TELEPHONE ENCOUNTER
Lov: 4/28/21  Lrf: 11/23/20  Na: 8/30/21  Health Maintenance   Topic Date Due    COVID-19 Vaccine (1) Never done    HIV screen  Never done    Shingles Vaccine (1 of 2) Never done    Diabetic retinal exam  05/07/2021    Diabetic foot exam  10/29/2021    A1C test (Diabetic or Prediabetic)  04/26/2022    Lipid screen  04/26/2022    Potassium monitoring  04/26/2022    Creatinine monitoring  04/26/2022    Breast cancer screen  07/16/2022    Colon cancer screen colonoscopy  02/27/2023    Pneumococcal 0-64 years Vaccine (2 of 2) 06/25/2023    DTaP/Tdap/Td vaccine (3 - Td) 09/13/2028    Flu vaccine  Completed    Hepatitis C screen  Completed    Hepatitis A vaccine  Aged Out    Hib vaccine  Aged Out    Meningococcal (ACWY) vaccine  Aged Out             (applicable per patient's age: Cancer Screenings, Depression Screening, Fall Risk Screening, Immunizations)    Hemoglobin A1C (%)   Date Value   04/26/2021 6.2 (H)   08/31/2020 7.3 (H)   11/04/2019 6.7 (H)     Microalb/Crt.  Ratio (mcg/mg creat)   Date Value   04/23/2019 5     LDL Cholesterol (mg/dL)   Date Value   04/26/2021 106     AST (U/L)   Date Value   04/26/2021 17     ALT (U/L)   Date Value   04/26/2021 17     BUN (mg/dL)   Date Value   04/26/2021 14      (goal A1C is < 7)   (goal LDL is <100) need 30-50% reduction from baseline     BP Readings from Last 3 Encounters:   04/28/21 118/84   10/29/20 126/88   11/27/19 138/86    (goal /80)      All Future Testing planned in [facility]:  Lab Frequency Next Occurrence   MRI HIP LEFT WO CONTRAST Once 10/29/2021       Next Visit Date:  Future Appointments   Date Time Provider Jazmin Chan   8/30/2021  9:00 AM Pham Gomes APRN - CNP [facility]            Patient Active Problem List:     Diabetes mellitus 2 with renal manifestations, controlled     Lumbar disc herniation with radiculopathy     Hyperlipidemia     Major depression     Peripheral neuropathy     ARNIE (obstructive sleep apnea) TMJ arthralgia     GERD (gastroesophageal reflux disease)     Acute pain of left hip

## 2021-06-03 ENCOUNTER — HOSPITAL ENCOUNTER (OUTPATIENT)
Dept: PHYSICAL THERAPY | Facility: CLINIC | Age: 61
Setting detail: THERAPIES SERIES
Discharge: HOME | End: 2021-06-03
Payer: COMMERCIAL

## 2021-06-03 PROCEDURE — 97161 PT EVAL LOW COMPLEX 20 MIN: CPT

## 2021-06-03 PROCEDURE — 97140 MANUAL THERAPY 1/> REGIONS: CPT

## 2021-06-03 NOTE — CONSULTS
[] [facility]) - [facility] &  Therapy  [address]  [phone]  F: [phone] [] [address]  [facility]   [address]  P: [phone]  F: [phone] [] [address] &  Therapy  [address]  P: [phone]  F: [phone] [x] [address]  P: [phone]  F: [phone] [] [address]  [facility]   [address]   [place]: [phone]  F: [phone]      Physical Therapy Lower Extremity Evaluation    Date:  6/3/2021  Patient: Lindsey Gomes    : 1960  MRN: [id number]  Physician: Neri ORTEGA CNP  Insurance: Medical [place]  Medical Diagnosis: Acute L hip pain  Rehab Codes: M25.552, M25.652  Onset date: 21 (order written)   Next Dr's appt.:     Subjective:   CC: chronic L hip and knee pain  HPI: L hip pain which is getting worse. XR were neg to hip and referred to PT for the hip. L knee was irritated by squatting down to  an item and felt a snap in the knee. As for the DM, she can tell if she spikes (irritable and pain in jaw) or drops (really tired and dizzy). The last time she was low it was 67. ariel does not carry anything. 2 years ago, before her 3rd surgery, her whole L side had significantly more pain.       PMHx: [] Unremarkable [x] Diabetes 1000mg metformin and 500mg at night [x] HTN-controlled  [] Pacemaker   [] MI/Heart Problems [] Cancer [] Arthritis  [x] Other: L3-S1 fusion by Dr. Shyam French, R RTC repair 2007, TMJ              [x] Refer to full medical chart  In EPIC       Comorbidities:   [x] Obesity [] Dialysis  [] N/A   [] Asthma/COPD [] Dementia [] Other:   [] Stroke [] Sleep apnea [] Other:   [] Vascular disease [] Rheumatic disease [] Other:     Tests: [x] X-Ray: [] MRI:  [] Other: Medications: [x] Refer to full medical record [] None [] Other:  Allergies:      [x] Refer to full medical record [] None [] Other:    Function:  Hand Dominance  [] Right  [] Left  Patient lives with: In what type of home []  One story   [] Two story   [] Split level   Number of stairs to enter    With handrail on the []  Right to enter   [] Left to enter   Bathroom has a []  Tub only  [] Tub/shower combo   [] Walk in shower    []  Grab bars   Washing machine is on []  Main level   [] Second level   [] Basement   Employer    Job Status []  Normal duty   [] Light duty   [] Off due to condition    [x]  Retired   [] Not employed   [] Disability  [] Other:  []  Return to work: 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, vacuum, drives a [place],   Works part time, hunting, classic cars, [address]       ADL/IADL Previous level of function Current level of function Who currently assists the patient with task   Bathing  [] Independent  [] Assist [x] Independent  [] Assist    Dress/grooming [] Independent  [] Assist [x] Independent  [] Assist    Transfer/mobility [] Independent  [] Assist [x] Independent  [] Assist    Feeding [] Independent  [] Assist [x] Independent  [] Assist    Toileting [] Independent  [] Assist [x] Independent  [] Assist    Driving [] Independent  [] Assist [x] Independent  [] Assist    Housekeeping [] Independent  [] Assist [x] Independent  [] Assist    Grocery shop/meal prep [] Independent  [] Assist [x] Independent  [] Assist      Gait Prior level of function Current level of function    [] Independent  [] Assist [x] Independent  [] Assist   Device: [] Independent [x] Independent    [] Straight Cane [] Quad cane [] Straight Cane [] Quad cane    [] Standard walker [] Rolling walker   [] 4 wheeled walker [] Standard walker [] Rolling walker   [] 4 wheeled walker    [] Wheelchair [] Wheelchair     Pain:  [x] Yes  [] No Location: L hip Pain Rating: (0-10 scale) 6-9/10 constant pain  Pain altered Tx:  [] Yes  [x] No  Action:    Symptoms:  [] Improving [x] Worsening as the duration is now constant. [] Same  Better:  [] AM    [] PM    [] Sit    [] Rise/Sit    []Stand    [] Walk    [] Lying    [] Other:  Worse: [] AM    [] PM    [] Sit    [] Rise/Sit    []Stand    [] Walk    [] Lying    [] Bend                      [] Valsalva    [] Other:  Sleep: [] OK    [x] Disturbed as she finds it very painful to sleep on her L side. Tries a pillow between the knees, but is not comfortable again. Objective:    ROM  ° A/P STRENGTH TESTS (+/-) Left Right Not Tested    Left Right Left Right Ant.  Drawer   []   Hip Flex wnl    Post. Drawer   []   Ext 3  2-  Lachmans   []   ER wnl  4  Valgus Stress   []   IR wnl    Varus Stress   []   ABD 15  2+  Reyess   []   ADD     Apleys Comp.   []   Knee Flex   5  Apleys Dist.   []   Ext   5  Hip Scouring   []   Ankle DF     SHADYs   []   PF     Piriformis   []   INV     Ramanas   []   EVER     Talor Tilt   []        Pat-Fem Grind   []       OBSERVATION No Deficit Deficit Not Tested Comments   Posture       Forward Head [] [] []    Rounded Shoulders [] [] []    Kyphosis [] [] []    Lordosis [] [] []    Lateral Shift [] [] []    Scoliosis [] [] []    Iliac Crest [] [] []    PSIS [] [] []    ASIS [] [] []    Genu Valgus [] [] []    Genu Varus [] [] []    Genu Recurvatum [] [] []    Pronation [] [] []    Supination [] [] []    Leg Length Discrp [] [] []    Slumped Sitting [] [] []    Palpation [] [x] [] L hip flexor, piriformis, gluts   Sensation [] [] []    Edema [] [] []    Neurological [] [] []    Patellar Mobility [] [] []    Patellar Orientation [] [] []    Gait [] [] [] Analysis:         FUNCTION Normal Difficult Unable   Sitting [] [] []   Standing [] [] []   Ambulation [] [] []   Groom/Dress [] [] []   Lift/Carry [] [] []   Stairs [] [] []   Bending [] [] []   Squat [] [] []   Kneel [] [] []     BALANCE/PROPRIOCEPTION              [x] Not tested   Single leg stance       R                     L                                PAIN   Eyes open                             Sec. Sec                  . []    Eyes closed                          Sec. Sec                  . []        FUNCTIONAL TESTS PAIN NO PAIN COMMENTS   Step Test 4 [] []    6 [] []    8 [] []    Squat [] []      Functional Test: Modified Oswestry Score: 49% functionally impaired     Comments:    Assessment:  Patient would benefit from skilled physical therapy services in order to: improve L glut function, decrease spasm of L hip flexor, piriformis, and adductor, and increase strength of L glut med and max, TVA    Problems:    [x] ? Pain:  [x] ? ROM:  [x] ? Strength:  [x] ? Function:  [] Other:       STG: (to be met in 10 treatments)  1. ? Pain:<3/10 in L hip   2. ? ROM: increase to 15 deg of hip ext, 30 deg of hip abd  3. ? Strength: of glut max, glut med and TVA function  4. ? Function:Modified Oswestry  <30% interference  5. Patient to be independent with home exercise program as demonstrated by performance with correct form without cues. LTG: (to be met in 20 treatments)  1. Pain <2/10  2. Modified Oswestry <20% interference  3. At least 4/5 with glut med and glut max function                   Patient goals: \"feel better, know of ways to eliminate pain\"    Rehab Potential:  [] Good  [x] Fair  [] Poor   Suggested Professional Referral:  [x] No  [] Yes:  Barriers to Goal Achievement:  [x] No  [] Yes:  Domestic Concerns:  [x] No  [] Yes:    Pt. Education:  [x] Plans/Goals, Risks/Benefits discussed  [] Home exercise program    Method of Education: [x] Verbal  [] Demo  [] Written  Comprehension of Education:  [] Verbalizes understanding. [] Demonstrates understanding. [x] Needs Review. [] Demonstrates/verbalizes understanding of HEP/Ed previously given.     Treatment Plan:  [x] Therapeutic Exercise   80708  [] Iontophoresis: 4 mg/mL Dexamethasone Sodium Phosphate  mAmin  [id number]   [x] Therapeutic Activity  05471 [] Vasopneumatic cold with compression  26493    [x] Gait Training   42220 [] Ultrasound   [id number]   [x] Neuromuscular Re-education  28596 [] Electrical Stimulation Unattended  42084   [x] Manual Therapy  49468 [] Electrical Stimulation Attended  09918   [x] Instruction in HEP  [] Lumbar/Cervical Traction  [id number]   [x] Aquatic Therapy   03335 [] Cold/hotpack    [] Massage   84080      [] Dry Needling, 1 or 2 muscles  88924   [] Biofeedback, first 15 minutes   28615  [] Biofeedback, additional 15 minutes   23955 [] Dry Needling, 3 or more muscles  37550     []  Medication allergies reviewed for use of    Dexamethasone Sodium Phosphate 4mg/ml     with iontophoresis treatments. Pt is not allergic. Frequency:  2 x/week for 20visits        Todays Treatment:  Modalities: none  Precautions: standard  Exercises:  Exercise Reps/ Time Weight/ Level Issued for HEP  Completed Comments   Bike *       Supine        Josafat stretch *       Bridges  *       SAQ *       Sidelying        Macey hip abd *       Clam shells *       Prone        Hip ext *       Gym        Hip adductor stretch *                                                       Other:  Manual  1. IASTM via Hypervolt to L piriformis  2. DI to proximal psoas and iliacus    Specific Instructions for next treatment:  1. Continue with manual  2.   Add strengthening and flexibility ex       Evaluation Complexity:  History (Personal factors, comorbidities) [] 0 [x] 1-2 [] 3+   Exam (limitations, restrictions) [x] 1-2 [] 3 [] 4+   Clinical presentation (progression) [x] Stable [] Evolving  [] Unstable   Decision Making [x] Low [] Moderate [] High    [x] Low Complexity [] Moderate Complexity [] High Complexity       Treatment Charges: Mins Units   [x] Evaluation       [x]  Low       []  Moderate       []  High  1   []  Modalities     []  Ther Exercise     [x]  Manual Therapy 20 1   []  Ther Activities     []  Aquatics

## 2021-06-10 ENCOUNTER — HOSPITAL ENCOUNTER (OUTPATIENT)
Dept: PHYSICAL THERAPY | Facility: CLINIC | Age: 61
Setting detail: THERAPIES SERIES
Discharge: HOME | End: 2021-06-10
Payer: COMMERCIAL

## 2021-06-10 PROCEDURE — 97140 MANUAL THERAPY 1/> REGIONS: CPT

## 2021-06-10 PROCEDURE — 97110 THERAPEUTIC EXERCISES: CPT

## 2021-06-10 NOTE — FLOWSHEET NOTE
[] [facility]) - [facility] &  Therapy  [address]  [phone]  F: [phone] [] [address]  [place] [address]   [address]  P: [phone]  F: [phone] [] [address] &  Therapy  [address]  P: [phone]  F: [phone] [] [address]  P: [phone]  F: [phone] [] [address]  [place]   [address]   [place]: [phone]  F: [phone]      Physical Therapy Daily Treatment Note    Date:  6/10/2021  Patient Name:  Lyndsay Richards    :  1960  MRN: [id number]  Physician: Ro Ragland APRN CNP                    Insurance: Medical [place]  Medical Diagnosis: Acute L hip pain                        Rehab Codes: M25.552, M25.652  Onset date: 21 (order written)                           Next Dr's appt. :     Visit# / total visits: ; Progress note for Medicare patient due at visit 10. Cancels/No Shows: 0/0    Subjective:    Pain:  [x] Yes  [] No Location: L Hip  Pain Rating: (0-10 scale) 3-4/10  Pain altered Tx:  [x] No  [] Yes  Action:  Comments: Pt reports pihermelinda is located in L post, lat and ant hip, which travels down into L ant thigh. Pt arrives 5' late today.      Objective:  Modalities: none  Precautions: standard; L3-S1 fusion 20  Exercises:  Exercise Reps/ Time Weight/ Level Issued for HEP  Completed Comments   Bike 5' Seat 1    x             Supine             Piriformis stretch 2x30\"  x x    Josafat stretch 2x30\"     x     Bridges  10x   x x     SAQ 10x     x             Sidelying             Macey hip abd 10x     x     Clam shells 10x   x x             Prone             Hip ext 10x     x  1 pillow under hips           Gym             Hip adductor stretch 2x30\"     x                               Other: Manual  1. IASTM via Hypervolt to L piriformis  2. DI to proximal psoas and iliacus     Specific Instructions for next treatment:  1. Continue with manual  2. Add strengthening and flexibility ex       Treatment Charges: Mins Units   []  Modalities     [x]  Ther Exercise 35 2   [x]  Manual Therapy 15 1   []  Ther Activities     []  Aquatics     []  Vasocompression     []  Other     Total Treatment time 50 3       Assessment: [x] Progressing toward goals. Initiated tx with warm up on bike x5' pt states L knee started to get aggravated towards the end of the 5'. PTA student then performed IASTM as well as DI to L piriformis followed by DI to L psoas and iliacus with fair jean marie; decreased tension noted after manual. Pt then performed B piriformis and hip flexor stretches, req mod cues for proper technique. Pt then able to complete gentle strengthening exercises with good jean marie. Ended with hip adductor stretch. Fair jean marie to interventions this date. Issued HEP with good understanding given by pt.      [] No change. [] Other:  [x] Patient would continue to benefit from skilled physical therapy services in order to:  improve L glut function, decrease spasm of L hip flexor, piriformis, and adductor, and increase strength of L glut med and max, TVA    STG/LTG     STG: (to be met in 10 treatments)  1. ? Pain:<3/10 in L hip   2. ? ROM: increase to 15 deg of hip ext, 30 deg of hip abd  3. ? Strength: of glut max, glut med and TVA function  4. ? Function:Modified Oswestry  <30% interference  5. Patient to be independent with home exercise program as demonstrated by performance with correct form without cues.     LTG: (to be met in 20 treatments)  1. Pain <2/10  2. Modified Oswestry <20% interference  3. At least 4/5 with glut med and glut max function                    Patient goals: \"feel better, know of ways to eliminate pain\"    Pt.  Education:  [x] Yes  [] No  [] Reviewed Prior HEP/Ed  Method of Education: [x] Verbal  [x] Demo  [x] Written  Comprehension of Education:  [] Verbalizes understanding. [] Demonstrates understanding. [x] Needs review. [x] Demonstrates/verbalizes HEP/Ed previously given. Plan: [x] Continue current frequency toward long and short term goals.     [] Specific Instructions for subsequent treatments:     Frequency:  2 x/week for 20visits      Time In: 2:05pm         Time Out: 3:00pm    Electronically signed by:  Alfredo Angulo PTA

## 2021-06-14 ENCOUNTER — HOSPITAL ENCOUNTER (OUTPATIENT)
Dept: PHYSICAL THERAPY | Facility: CLINIC | Age: 61
Setting detail: THERAPIES SERIES
Discharge: HOME | End: 2021-06-14
Payer: COMMERCIAL

## 2021-06-14 PROCEDURE — 97110 THERAPEUTIC EXERCISES: CPT

## 2021-06-14 PROCEDURE — 97140 MANUAL THERAPY 1/> REGIONS: CPT

## 2021-06-14 NOTE — FLOWSHEET NOTE
[] [address] [facility]EP [facility] &  Therapy  [address]  [phone]  F: [phone] [] [address]  [place] [address]   [address]  P: [phone]  F: [phone] [] [address] &  Therapy  [address] [facility]  P: [phone]  F: [phone] [x] [address]  P: [phone]  F: [phone] [] [address]  [facility]   [address]   [place]: [phone]  F: [phone]      Physical Therapy Daily Treatment Note    Date:  2021  Patient Name:  Lindsey Gomes    :  1960  MRN: [id number]  Physician: Neri ORTEGA CNP                    Insurance: Medical [place]  Medical Diagnosis: Acute L hip pain                        Rehab Codes: M25.552, M25.652  Onset date: 21 (order written)                           Next Dr's appt. :     Visit# / total visits: 3/20; Progress note for Medicare patient due at visit 10. Cancels/No Shows: 0/0    Subjective:    Pain:  [x] Yes  [] No Location: L Hip  Pain Rating: (0-10 scale) 3-4/10  Pain altered Tx:  [x] No  [] Yes  Action:  Comments: Pt reports that she feels worse after PT especially in the afternoon but not the next day. She has pain with her hip abduction exercise at home. Pt arrives 5' late today.      Objective:  Modalities: none  Precautions: standard; L3-S1 fusion 20  Exercises:  Exercise Reps/ Time Weight/ Level Issued for HEP  Completed Comments   Bike 5' Seat 1                 Supine             Piriformis stretch 2x30\"  x x    Josafat stretch 2x30\"          Bridges  10x   x x     SAQ 10x     x             Sidelying             Macey hip abd 10x     x     Clam shells 15x   x x             Prone             Hip ext 10x       1 pillow under hips           Gym             Hip adductor stretch 2x30\"     x                                 Other:  Manual  1. MFR to L piriformis and gluteus medius  2. DI to proximal psoas and iliacus    Patient was informed of risks including but not limited to drowsiness, dizziness, minor bruising or bleeding, temporary pain, tingling , numbness and a low risk of pneumothrax. She was informed of the potential benefits of Dry Needling. Patient gave verbal consent to proceed and signed a Dry Needling Acknowledgement form. Dry Needling perfomed in conjunction with Manual therapy to promote tissue extensibility, improve ROM, and reduce pain. No charge submitted for the time the needle was inserted. 2 left gluteus medius  trigger points treated with a 75 mm needle using a bony backdrop for safety. Multiple twitch responses produced. 2 left piriformis  trigger points treated with a 75 mm needle using  a bony backdrop for safety. Multiple twitch responses produced.         Specific Instructions for next treatment:  1. Continue with manual  2. Add strengthening and flexibility ex       Treatment Charges: Mins Units   []  Modalities     [x]  Ther Exercise 25 2   [x]  Manual Therapy 15 1   []  Ther Activities     []  Aquatics     []  Vasocompression     [x]  Other: Dry Needling 5 -   Total Treatment time 45 3       Assessment: [x] Progressing toward goals. Initiated tx with manual therapy followed by a trial of Dry Needling. She reported reduced pain with walking following MT and needling. Hip strengthening was completed with no pain except with hip abduction although the pain intensity was reduced. [] No change.      [] Other:  [x] Patient would continue to benefit from skilled physical therapy services in order to:  improve L glut function, decrease spasm of L hip flexor, piriformis, and adductor, and increase strength of L glut med and max, TVA    STG/LTG     STG: (to be met in 10 treatments)  1. ? Pain:<3/10 in L hip   2. ? ROM: increase to 15 deg of hip ext, 30 deg of hip abd  3. ? Strength: of glut max, glut med and TVA function  4. ? Function:Modified Oswestry  <30% interference  5. Patient to be independent with home exercise program as demonstrated by performance with correct form without cues.     LTG: (to be met in 20 treatments)  1. Pain <2/10  2. Modified Oswestry <20% interference  3. At least 4/5 with glut med and glut max function                    Patient goals: \"feel better, know of ways to eliminate pain\"    Pt. Education:  [x] Yes  [] No  [] Reviewed Prior HEP/Ed  Method of Education: [x] Verbal  [x] Demo  [x] Written  Comprehension of Education:  [] Verbalizes understanding. [] Demonstrates understanding. [x] Needs review. [x] Demonstrates/verbalizes HEP/Ed previously given. Plan: [x] Continue current frequency toward long and short term goals.     [] Specific Instructions for subsequent treatments:     Frequency:  2 x/week for 20visits      Time In: 11:05am         Time Out: 11:50am    Electronically signed by:  Amando Juárez, PT

## 2021-06-17 ENCOUNTER — HOSPITAL ENCOUNTER (OUTPATIENT)
Dept: PHYSICAL THERAPY | Facility: CLINIC | Age: 61
Setting detail: THERAPIES SERIES
Discharge: HOME | End: 2021-06-17
Payer: COMMERCIAL

## 2021-06-17 PROCEDURE — 97140 MANUAL THERAPY 1/> REGIONS: CPT

## 2021-06-17 PROCEDURE — 97110 THERAPEUTIC EXERCISES: CPT

## 2021-06-17 NOTE — FLOWSHEET NOTE
stretch 2x30\"  x     Josafat stretch 3x30\"     x     Bridges  2x10   x x     SAQ 2x10 3 lbs  6/17 x     LAQ 2x15 3 lbs 6/17 x    Sidelying             Macey hip abd 10x     --     Clam shells 15x   x x             Prone             Hip ext 10x     *  1 pillow under hips   Glut sets 10x10\"   x    Gym             L Hip adductor stretch 3x30\"     x  on step   Total Gym squats 2x15 L21   x 2 legged   Lateral step ups 2x10 4\" 6/17 x    Lunges 15    6/17 x In // bars   Other:  Manual  1. MFR via IASTM with Hypervolt to L piriformis and gluteus medius  2. DI to proximal psoas and iliacus-held on today's date       Specific Instructions for next treatment:  1. Continue with manual  2. Add strengthening and flexibility ex       Treatment Charges: Mins Units   []  Modalities     [x]  Ther Exercise 45 3   [x]  Manual Therapy 14 1   []  Ther Activities     []  Aquatics     []  Vasocompression     []  Other: Total Treatment time 59 4       Assessment: [x] Progressing toward goals. Moved the seat forward to decrease amount of spinal rotation with use of arms. Added ex to her program for glut max and glut med strengthening. Decreased feeling of tightness in L hip     [] No change. [] Other:  [x] Patient would continue to benefit from skilled physical therapy services in order to:  improve L glut function, decrease spasm of L hip flexor, piriformis, and adductor, and increase strength of L glut med and max, TVA    STG/LTG     STG: (to be met in 10 treatments)  1. ? Pain:<3/10 in L hip   2. ? ROM: increase to 15 deg of hip ext, 30 deg of hip abd  3. ? Strength: of glut max, glut med and TVA function  4. ? Function:Modified Oswestry  <30% interference  5. Patient to be independent with home exercise program as demonstrated by performance with correct form without cues.     LTG: (to be met in 20 treatments)  1. Pain <2/10  2. Modified Oswestry <20% interference  3.  At least 4/5 with glut med and glut max function    Patient goals: \"feel better, know of ways to eliminate pain\"    Pt. Education:  [x] Yes  [] No  [x] Reviewed Prior HEP/Ed  Method of Education: [x] Verbal  [x] Demo  [x] Written  Comprehension of Education:  [] Verbalizes understanding. [] Demonstrates understanding. [x] Needs review. [] Demonstrates/verbalizes HEP/Ed previously given. Plan: [x] Continue current frequency toward long and short term goals.     [] Specific Instructions for subsequent treatments:     Frequency:  2 x/week for 20visits      Time In: 7280         Time Out:  6186    Electronically signed by:  Dereck Win, PT

## 2021-06-21 ENCOUNTER — HOSPITAL ENCOUNTER (OUTPATIENT)
Dept: PHYSICAL THERAPY | Facility: CLINIC | Age: 61
Setting detail: THERAPIES SERIES
Discharge: HOME | End: 2021-06-21
Payer: COMMERCIAL

## 2021-06-21 PROCEDURE — 97110 THERAPEUTIC EXERCISES: CPT

## 2021-06-21 PROCEDURE — 97140 MANUAL THERAPY 1/> REGIONS: CPT

## 2021-06-21 NOTE — FLOWSHEET NOTE
[] [facility]) - [facility]  [address]  [phone]  F: [phone] [] [address]  [place] [address]   [address]  P: [phone]  F: [phone] [] [address] &  Therapy  [address]  P: [phone]  F: [phone] [x] [address]  P: [phone]  F: [phone] [] [address]  [facility]   [address]   [place]: [phone]  F: [phone]      Physical Therapy Daily Treatment Note    Date:  2021  Patient Name:  Lindsey Gomes     :  1960  MRN: [id number]  Physician: Neri ORTEGA CNP                     Insurance: Medical [place]  Medical Diagnosis: Acute L hip pain                        Rehab Codes: M25.552, M25.652  Onset date: 21 (order written)                         Next 's appt.:   Visit# / total visits:      Cancels/No Shows: 0/0    Subjective:    Pain:  [x] Yes  [] No Location: L Hip  Pain Rating: (0-10 scale) 4/10  Pain altered Tx:  [x] No  [] Yes  Action:  Comments: Pt states to having a rough night last night, had a hard time falling asleep d/t hip pain.          Objective:  Modalities: none  Precautions: standard; L3-S1 fusion 20    Exercises:  Exercise  L hip  Reps/ Time Weight/ Level Issued for HEP  Completed Comments   NUSTEP  5' Seat 1    x  move seat all of the way forward           Supine             Piriformis stretch 2x30\"  x     Josafat stretch 3x30\"     x     Bridges  2x10   x x     SAQ 2x10 3 lbs   -     LAQ 2x15 3 lbs  -    Sidelying             Macey hip abd 10x     --     Clam shells 15x   x x             Prone             Hip ext 10x     *  1 pillow under hips   Glut sets 10x10\"   x    Gym             L Hip adductor stretch 3x30\"     x  on step   Total Gym squats 2x15 L21   x 2 legged   Lateral step ups 2x10 4\" 6/17 x    Lunges 15    6/17 x In // bars   Other:  Manual  1. MFR via IASTM with Hypervolt to L piriformis and gluteus medius  2. DI to proximal psoas and iliacus-held on today's date       Specific Instructions for next treatment:  1. Continue with manual  2. Add strengthening and flexibility ex       Treatment Charges: Mins Units   []  Modalities     [x]  Ther Exercise 35 2   [x]  Manual Therapy 10 1   []  Ther Activities     []  Aquatics     []  Vasocompression     []  Other: Total Treatment time 45 3       Assessment: [x] Progressing toward goals. Initiated treatment on NUSTEP versus bike d/t having increased soreness with bike last visit. Able to complete all exercises without any increase in pain. Pt noting minor muscular soreness during side lying clamshells, however no increase in sharp pain. Pt having a muscle cramp in HS during prone hip ext, decreased after rest break. Pt left with decreased hip tightness. [] No change. [] Other:  [x] Patient would continue to benefit from skilled physical therapy services in order to:  improve L glut function, decrease spasm of L hip flexor, piriformis, and adductor, and increase strength of L glut med and max, TVA    STG/LTG     STG: (to be met in 10 treatments)  1. ? Pain:<3/10 in L hip   2. ? ROM: increase to 15 deg of hip ext, 30 deg of hip abd  3. ? Strength: of glut max, glut med and TVA function  4. ? Function:Modified Oswestry  <30% interference  5. Patient to be independent with home exercise program as demonstrated by performance with correct form without cues.     LTG: (to be met in 20 treatments)  1. Pain <2/10  2. Modified Oswestry <20% interference  3. At least 4/5 with glut med and glut max function                    Patient goals: \"feel better, know of ways to eliminate pain\"    Pt.  Education:  [x] Yes  [] No  [x] Reviewed Prior HEP/Ed  Method of Education: [x] Verbal  [x] Demo [x] Written  Comprehension of Education:  [] Verbalizes understanding. [] Demonstrates understanding. [x] Needs review. [] Demonstrates/verbalizes HEP/Ed previously given. Plan: [x] Continue current frequency toward long and short term goals.     [] Specific Instructions for subsequent treatments:     Frequency:  2 x/week for 20visits      Time In: 0800        Time Out: 0845    Electronically signed by:  Darin Seth PT

## 2021-06-23 ENCOUNTER — HOSPITAL ENCOUNTER (OUTPATIENT)
Dept: PHYSICAL THERAPY | Facility: CLINIC | Age: 61
Setting detail: THERAPIES SERIES
Discharge: HOME | End: 2021-06-23
Payer: COMMERCIAL

## 2021-06-23 PROCEDURE — 97110 THERAPEUTIC EXERCISES: CPT

## 2021-06-23 NOTE — FLOWSHEET NOTE
[] [facility]) - [facility]  [address]  [phone]  F: [phone] [] [address]  [employer] [address]   [address]  P: [phone]  F: [phone] [] [address] &  Therapy  [address] [place] [address]  P: [phone]  F: [phone] [x] [address]  P: [phone]  F: [phone] [] [address]  [facility]   [address]   [place]: [phone]  F: [phone]      Physical Therapy Daily Treatment Note    Date:  2021  Patient Name:  Maurice Phillips     :  1960  MRN: [id number]  Physician: Nehal ORTEGA CNP                     Insurance: Medical [place]  Medical Diagnosis: Acute L hip pain                        Rehab Codes: M25.552, M25.652  Onset date: 21 (order written)                         Next 's appt.:   Visit# / total visits:      Cancels/No Shows: 0/0    Subjective:    Pain:  [x] Yes  [] No Location: L Hip  Pain Rating: (0-10 scale) 2/10  Pain altered Tx:  [x] No  [] Yes  Action:  Comments: Pt arrives with decreased pain overall, still having some L hip stiffness.           Objective:  Modalities: none  Precautions: standard; L3-S1 fusion 20    Exercises:  Exercise  L hip  Reps/ Time Weight/ Level Issued for HEP  Completed Comments   NUSTEP  5' Seat 1    x  move seat all of the way forward           Supine             Piriformis stretch 2x30\"  x     Josafat stretch 3x30\"     x     Bridges  2x10   x x     SAQ 2x10 3 lbs   -     LAQ 2x15 3 lbs  -    Sidelying             Macey hip abd 10x     --     Clam shells 15x   x x  add tband next           Prone             Hip ext 10x     *  1 pillow under hips   Glut sets 10x10\"   x    Gym             L Hip adductor stretch 3x30\"     x  on step   Total Gym squats 2x15 L21   x 2 legged   Lateral step ups 2x10 6\" on stairs Increased height 6/23 x    Lunges 2x15    6/17 x In // bars   Side steps with tband     Next            Other:  Manual  1. MFR via IASTM with Hypervolt to L piriformis and gluteus medius  2. DI to proximal psoas and iliacus-held on today's date       Specific Instructions for next treatment:  1. Continue with manual  2. Add strengthening and flexibility ex       Treatment Charges: Mins Units   []  Modalities     [x]  Ther Exercise 30 2   [x]  Manual Therapy 5 0   []  Ther Activities     []  Aquatics     []  Vasocompression     []  Other: Total Treatment time 35 2       Assessment: [x] Progressing toward goals. Initiated treatment with Valorie Carrillo as a dynamic warm up. Progressed strengthening via increasing step ups from 4\" step height to 6\" which pt tolerated well without any increase in pain or soreness. Greatest difficulty remains with prone hip extension, however able to continue. [] No change. [] Other:  [x] Patient would continue to benefit from skilled physical therapy services in order to:  improve L glut function, decrease spasm of L hip flexor, piriformis, and adductor, and increase strength of L glut med and max, TVA    STG/LTG     STG: (to be met in 10 treatments)  1. ? Pain:<3/10 in L hip   2. ? ROM: increase to 15 deg of hip ext, 30 deg of hip abd  3. ? Strength: of glut max, glut med and TVA function  4. ? Function:Modified Oswestry  <30% interference  5. Patient to be independent with home exercise program as demonstrated by performance with correct form without cues.     LTG: (to be met in 20 treatments)  1. Pain <2/10  2. Modified Oswestry <20% interference  3. At least 4/5 with glut med and glut max function                    Patient goals: \"feel better, know of ways to eliminate pain\"    Pt.  Education:  [x] Yes  [] No  [x] Reviewed Prior HEP/Ed  Method of Education: [x] Verbal  [x] Demo  [x] Written  Comprehension of Education:  [] Verbalizes understanding. [] Demonstrates understanding. [x] Needs review. [] Demonstrates/verbalizes HEP/Ed previously given. Plan: [x] Continue current frequency toward long and short term goals.     [] Specific Instructions for subsequent treatments:     Frequency:  2 x/week for 20visits      Time In: 0815        Time Out: 0845    Electronically signed by:  Josie Alexandre PT

## 2021-06-28 ENCOUNTER — HOSPITAL ENCOUNTER (OUTPATIENT)
Dept: PHYSICAL THERAPY | Facility: CLINIC | Age: 61
Setting detail: THERAPIES SERIES
Discharge: HOME | End: 2021-06-28
Payer: COMMERCIAL

## 2021-06-28 PROCEDURE — 97110 THERAPEUTIC EXERCISES: CPT

## 2021-06-28 NOTE — FLOWSHEET NOTE
// bars    Total Gym squats 2x15 L21   x 2 legged  80/20 LLE   Lateral step ups 2x10 6\" on stairs  x Incr step height next    Lunges 2x15    x In // bars   Side steps with tband Next                Other:  Manual  1. MFR via IASTM with Hypervolt to L piriformis and gluteus medius  2. DI to proximal psoas and iliacus-held on today's date       Specific Instructions for next treatment:  1. Continue with manual  2. Add strengthening and flexibility ex       Treatment Charges: Mins Units   []  Modalities     [x]  Ther Exercise 50 3   []  Manual Therapy     []  Ther Activities     []  Aquatics     []  Vasocompression     []  Other: Total Treatment time 50 3       Assessment: [x] Progressing toward goals. Progressed pt to [employer] followed by supine stretches and mat exercises. Pt then reports decreased stiffness, post. Cont to // bars for adductor stretches followed by TG squats. Added a progression to SL squats of 80/20 WB on LLE w/ reports of muscle fatigue post, no longer stiff and no pain. Will plan to progress height of lateral step ups next visit as well as add side steps as pt tolerates. [] No change. [] Other:  [x] Patient would continue to benefit from skilled physical therapy services in order to:  improve L glut function, decrease spasm of L hip flexor, piriformis, and adductor, and increase strength of L glut med and max, TVA    STG/LTG     STG: (to be met in 10 treatments)  1. ? Pain:<3/10 in L hip   2. ? ROM: increase to 15 deg of hip ext, 30 deg of hip abd  3. ? Strength: of glut max, glut med and TVA function  4. ? Function:Modified Oswestry  <30% interference  5. Patient to be independent with home exercise program as demonstrated by performance with correct form without cues.     LTG: (to be met in 20 treatments)  1. Pain <2/10  2. Modified Oswestry <20% interference  3.  At least 4/5 with glut med and glut max function                    Patient goals: \"feel better, know of ways to eliminate pain\"    Pt. Education:  [x] Yes  [] No  [x] Reviewed Prior HEP/Ed  Method of Education: [x] Verbal  [x] Demo  [x] Written  Comprehension of Education:  [] Verbalizes understanding. [] Demonstrates understanding. [x] Needs review. [] Demonstrates/verbalizes HEP/Ed previously given. Plan: [x] Continue current frequency toward long and short term goals.     [] Specific Instructions for subsequent treatments:     Frequency:  2 x/week for 20visits      Time In: 0805       Time Out: 0900    Electronically signed by:  Ameena Pendleton PTA

## 2021-07-01 ENCOUNTER — HOSPITAL ENCOUNTER (OUTPATIENT)
Dept: PHYSICAL THERAPY | Facility: CLINIC | Age: 61
Setting detail: THERAPIES SERIES
Discharge: HOME | End: 2021-07-01
Payer: COMMERCIAL

## 2021-07-01 PROCEDURE — 97110 THERAPEUTIC EXERCISES: CPT

## 2021-07-01 NOTE — FLOWSHEET NOTE
[] [facility]) - [facility] &  Therapy  [address]  [phone]  F: [phone] [] [address]  [place] [address]   [address]  P: [phone]  F: [phone] [] [address] &  Therapy  [address]  P: [phone]  F: [phone] [x] [address]  P: [phone]  F: [phone] [] [address]  [facility]   [address]   [place]: [phone]  F: [phone]      Physical Therapy Daily Treatment Note    Date:  2021  Patient Name:  Keely Stewart    :  1960  MRN: [id number]  Physician: Charmaine ORTEGA CNP         Insurance: Medical [place]  Medical Diagnosis: Acute L hip pain           Rehab Codes: M25.552, M25.652  Onset date: 21 (order written)             Next 's appt.:   Visit# / total visits:    Cancels/No Shows: 0/0    Subjective:    Pain:  [x] Yes  [] No Location: L Hip  Pain Rating: (0-10 scale):   6/10  Pain altered Tx:  [] No  [x] Yes  Action:modified her ex program   Comments: Pt arrives reporting continued shooting pain in L lateral thigh. Some exercises (lunges, TB resisted clamshells) for the hip make the knee hurt. Trying to get the exercises 1x/day. She reports cramps in the middle of the night with calf and hamstring cramps when getting up to get a drink of water.          Objective:  Modalities: none  Precautions: standard; L3-S1 fusion 20  Exercises:  Exercise  L hip  Reps/ Time Weight/ Level Issued for HEP  Completed Comments   Ron 6' Seat 1   -- move seat all of the way forward and down           Supine             Piriformis stretch 2x30\"  x     Josafat stretch 3x30\"     x  off EOB   Quad sets 10x10\"  / x Added    Glut sets 10x10\"  7/1 x added   Bridges  2x10   x x     SAQ 2x10 5 lbs 6/17 x Increased from 3 to 5 lbs   LAQ 2x15 3 lbs 6/17 - Held on today's date   Diaphragmatic breathing 2x10  7/1 x added   Sidelying             Macey hip abd 2x10     x     Clam shells 2x10  x x Reduced resistance from orange to no resistance    Prone             Hip ext 2x10     x Pt opted for no pillow. Reduced #reps to 10 from 15   Glut sets 10x10\"   --    Quadruped        Leg raises 10   x    Gym             L Hip adductor stretch 3x30\" ea     x L foot on 2nd 6\" step    Total Gym squats 2x15 L12   x L leg only   Lateral step ups 2x10 6\" on stairs  x No higher than 6\"    Lunges-alternating 15    x In // bars   Side steps with tband 2x15' orange 7/1 x             Other:  Manual  1. MFR via IASTM with Hypervolt to L piriformis and gluteus medius--held  2. DI to proximal psoas and iliacus-held on today's date       Specific Instructions for next treatment:  1. Continue with manual  2. Add strengthening and flexibility ex       Treatment Charges: Mins Units   []  Modalities     [x]  Ther Exercise 55 4   []  Manual Therapy     []  Ther Activities     []  Aquatics     []  Vasocompression     []  Other: Total Treatment time 55 4       Assessment: [x] Progressing toward goals. Added quad and glute sets. glute sets needed quite a bit of verbal cues to engage the glut max. 35 min into her program.  She stated that her L leg was tired. pt stated she was pleased with today's modifications to her program.   Issued diaph breathing, quad sets, monster walks. [] No change.      [] Other:  [x] Patient would continue to benefit from skilled physical therapy services in order to:  improve L glut function, decrease spasm of L hip flexor, piriformis, and adductor, and increase strength of L glut med and max, TVA    STG/LTG   STG: (to be met in 10 treatments)  1. ? Pain:<3/10 in L hip   2. ? ROM: increase to 15 deg of hip ext, 30 deg of hip abd  3. ? Strength: of glut max, glut med and TVA function  4. ? Function:Modified Oswestry  <30% interference  5. Patient to be independent with home exercise program as demonstrated by performance with correct form without cues.     LTG: (to be met in 20 treatments)  1. Pain <2/10  2. Modified Oswestry <20% interference  3. At least 4/5 with glut med and glut max function                  Patient goals: \"feel better, know of ways to eliminate pain\"    Pt. Education:  [x] Yes  [] No  [x] Reviewed Prior HEP/Ed  Method of Education: [x] Verbal  [x] Demo  [x] Written  Comprehension of Education:  [] Verbalizes understanding. [] Demonstrates understanding. [x] Needs review. [] Demonstrates/verbalizes HEP/Ed previously given. Plan: [x] Continue current frequency toward long and short term goals.     [] Specific Instructions for subsequent treatments:     Frequency:  2 x/week for 20 visits      Time In: 0165  Time Out:  1000    Electronically signed by:  Kasey Malcolm PT

## 2021-07-08 ENCOUNTER — HOSPITAL ENCOUNTER (OUTPATIENT)
Dept: PHYSICAL THERAPY | Facility: CLINIC | Age: 61
Setting detail: THERAPIES SERIES
Discharge: HOME | End: 2021-07-08
Payer: COMMERCIAL

## 2021-07-08 PROCEDURE — 97140 MANUAL THERAPY 1/> REGIONS: CPT

## 2021-07-08 PROCEDURE — 97110 THERAPEUTIC EXERCISES: CPT

## 2021-07-08 NOTE — FLOWSHEET NOTE
[] [facility]) - [facility] &  Therapy  [address]  [phone]  F: [phone] [] [address]  [facility]   [address]  P: [phone]  F: [phone] [] [address] &  Therapy  [address]  P: [phone]  F: [phone] [x] [address]  P: [phone]  F: [phone] [] [address]  [facility]   [address]   Geovani Mclainke: [phone]  F: [phone]      Physical Therapy Daily Treatment Note    Date:  2021  Patient Name:  Bunny Mondragon    :  1960  MRN: [id number]  Physician: Whit ORTEGA CNP         Insurance: Medical [place]  Medical Diagnosis: Acute L hip pain           Rehab Codes: M25.552, M25.652  Onset date: 21 (order written)             Next Dr's appt.:   Visit# / total visits:    Cancels/No Shows: 0/0    Subjective:    Pain:  [x] Yes  [] No Location: L Hip  Pain Rating: (0-10 scale):   4/10  Pain altered Tx:  [] No  [x] Yes  Action:modified her ex program   Comments: The patient states that her hip feels okay and notes that she is getting the shooting pain into the anterior and lateral side of her thigh. She states that the pain will radiate to the side of the hip. She notices this pain the most when stepping sideways to her left.           Objective:  Modalities: none  Precautions: standard; L3-S1 fusion 20  Exercises:  Exercise  L hip  Reps/ Time Weight/ Level Issued for HEP  Completed Comments   AirDyne 6' Seat 1    move seat all of the way forward and down   Nus-step 6' Level 5   x    Supine             Piriformis stretch 2x30\"  x     Josafat stretch 3x30\"       off EOB   Quad sets 10x10\"    Added    Glut sets 10x10\"    added   Bridges  3x10   x x     SAQ 2x10 5 lbs   Increased from 3 to 5 lbs   LAQ 2x15 3 lbs 6/17  Held on today's date   Diaphragmatic breathing 2x10  7/1 x added   Sidelying             Macey hip abd 2x10          Clam shells 2x10  x x    Prone             Hip ext 2x10     x Pt opted for no pillow. Reduced #reps to 10 from 15   Glut sets 10x10\"   x    Quadruped        Leg raises 10       Gym             L Hip adductor stretch 3x30\" ea      L foot on 2nd 6\" step    Total Gym squats 2x15 L12    L leg only   Lateral step ups 2x10 6\" on stairs   No higher than 6\"    Lunges-alternating 15     In // bars   Side steps with tband 2x15' orange 7/1              Other:  Manual  1. MFR via IASTM with Hypervolt to L piriformis and gluteus medius x 15 minutes        Specific Instructions for next treatment:  1. Continue with manual  2. Add strengthening and flexibility ex       Treatment Charges: Mins Units   []  Modalities     [x]  Ther Exercise 30 2   [x]  Manual Therapy 15 1   []  Ther Activities     []  Aquatics     []  Vasocompression     []  Other: Total Treatment time 50 3       Assessment: [x] Progressing toward goals. Patient presents with shooting pain in the anterior and lateral thigh. Following warm up, completed STM to the gluteals, paraspinals and piriformis using the hypervolt and trigger point release techniques. The patient demonstras a positive femoral nerve tension test. Provided femoral nerve glides with mild increases in numbness and tingling but improved pain and tightness. The patient was appropriately challenged with all exercises listed above completing each with good tecnique and no increased pain symptoms. [] No change.      [] Other:  [x] Patient would continue to benefit from skilled physical therapy services in order to:  improve L glut function, decrease spasm of L hip flexor, piriformis, and adductor, and increase strength of L glut med and max, TVA    STG/LTG   STG: (to be met in 10 treatments)  1. ? Pain:<3/10 in L hip   2. ? ROM: increase to 15 deg of hip ext, 30 deg of hip abd  3. ? Strength: of glut max, glut med and TVA function  4. ? Function:Modified Oswestry  <30% interference  5. Patient to be independent with home exercise program as demonstrated by performance with correct form without cues.     LTG: (to be met in 20 treatments)  1. Pain <2/10  2. Modified Oswestry <20% interference  3. At least 4/5 with glut med and glut max function                  Patient goals: \"feel better, know of ways to eliminate pain\"    Pt. Education:  [x] Yes  [] No  [x] Reviewed Prior HEP/Ed  Method of Education: [x] Verbal  [x] Demo  [] Written  Comprehension of Education:  [] Verbalizes understanding. [] Demonstrates understanding. [x] Needs review. [] Demonstrates/verbalizes HEP/Ed previously given. Plan: [x] Continue current frequency toward long and short term goals. [] Specific Instructions for subsequent treatments: May benefit from femoral nerve glides     Frequency:  2 x/week for 20 visits      Time In: 3:00 pm   Time Out:  3:55 pm    Electronically signed by:   Mesha Umaña, PT

## 2021-07-13 ENCOUNTER — HOSPITAL ENCOUNTER (OUTPATIENT)
Dept: PHYSICAL THERAPY | Facility: CLINIC | Age: 61
Setting detail: THERAPIES SERIES
Discharge: HOME | End: 2021-07-13
Payer: COMMERCIAL

## 2021-07-13 PROCEDURE — 97110 THERAPEUTIC EXERCISES: CPT

## 2021-07-13 NOTE — FLOWSHEET NOTE
[] [address]  - [facility] TWELVE-STEP [facility] &  Therapy  [address]  [phone]  F: [phone] [] [address]  [address]   [address]  P: [phone]  F: [phone] [] [address] &  Therapy  [address] [facility]  P: [phone]  F: [phone] [x] [address]  P: [phone]  F: [phone] [] [address]  [facility]   [address]   [place]: [phone]  F: [phone]      Physical Therapy Daily Treatment Note    Date:  2021  Patient Name:  Arnoldo Aguillon    :  1960  MRN: [id number]  Physician: Anibal ORTEGA CNP         Insurance: Medical [place]  Medical Diagnosis: Acute L hip pain           Rehab Codes: M25.552, M25.652  Onset date: 21 (order written)             Next 's appt.:   Visit# / total visits: 10/20   Cancels/No Shows: 0/0    Subjective:    Pain:  [x] Yes  [] No Location: L Hip  Pain Rating: (0-10 scale):   1/10  Pain altered Tx:  [] No  [x] Yes  Action:modified her ex program   Comments: It woke her up last night so she did some stretches and was able to go back to sleep.      Objective:  Modalities: none  Precautions: standard; L3-S1 fusion 20  Exercises:  Exercise  L hip  Reps/ Time Weight/ Level Issued for HEP  Completed Comments   Ron 6' Seat 1    move seat all of the way forward and down   Elliptical  L1  X    Supine             Piriformis stretch 2x30\"  x     Josafat stretch 3x30\"       off EOB   Quad sets 10x10\"  7/1     Glut sets 10x10\"  7/     Bridges  2x10   x X     SAQ 2x10 5 lbs 6/17 X    Diaphragmatic breathing 2x10  7/1 x    Sidelying             Macey hip abd 2x10     x     Clam shells 2x10  x x    Prone             Hip ext 2x10     X Pt opted for no pillow   Glut sets 10x10\"   --    Prone quad stretch 3x30\"  7/13 X Pillow under hips   Femoral nerve glides        Quadruped        Leg raises 10       Gym             L Hip adductor stretch 3x30\" ea     X L foot on 2nd 6\" step    Total Gym squats 2x15 L12   X L leg only   Lateral step ups 2x10 6\" on stairs  X No higher than 6\"    Lunges-alternating 15    X In // bars   Side steps with tband 2x15' orange 7/1 X             Other:  Manual  1. MFR via IASTM with Hypervolt to L piriformis and gluteus medius x 15 minutes        Specific Instructions for next treatment:  1. Continue with manual  2. Add strengthening and flexibility ex       Treatment Charges: Mins Units   []  Modalities     [x]  Ther Exercise 55 4   []  Manual Therapy     []  Ther Activities     []  Aquatics     []  Vasocompression     []  Other: Total Treatment time 55 4       Assessment: [x] Progressing toward goals. pt declines Hypervolt as she can perform this at home. At the conclusion of today's visit, she reported very little discomfort at the hip or knee. [] No change. [] Other:  [x] Patient would continue to benefit from skilled physical therapy services in order to:  improve L glut function, decrease spasm of L hip flexor, piriformis, and adductor, and increase strength of L glut med and max, TVA    STG/LTG   STG: (to be met in 10 treatments)  1. ? Pain:<3/10 in L hip   2. ? ROM: increase to 15 deg of hip ext, 30 deg of hip abd  3. ? Strength: of glut max, glut med and TVA function  4. ? Function:Modified Oswestry  <30% interference  5. Patient to be independent with home exercise program as demonstrated by performance with correct form without cues.     LTG: (to be met in 20 treatments)  1. Pain <2/10  2. Modified Oswestry <20% interference  3. At least 4/5 with glut med and glut max function                  Patient goals: \"feel better, know of ways to eliminate pain\"    Pt.  Education:  [x] Yes  [] No  [x] Reviewed Prior HEP/Ed  Method of Education: [x] Verbal [x] Demo  [] Written  Comprehension of Education:  [] Verbalizes understanding. [] Demonstrates understanding. [x] Needs review. [] Demonstrates/verbalizes HEP/Ed previously given. Plan: [x] Continue current frequency toward long and short term goals.     [] Specific Instructions for subsequent treatments:      Frequency:  2 x/week for 20 visits      Time In: 1141   Time Out:  1000    Electronically signed by:  Conchis Godinez PT

## 2021-07-15 ENCOUNTER — HOSPITAL ENCOUNTER (OUTPATIENT)
Dept: PHYSICAL THERAPY | Facility: CLINIC | Age: 61
Setting detail: THERAPIES SERIES
Discharge: HOME | End: 2021-07-15
Payer: COMMERCIAL

## 2021-07-15 NOTE — FLOWSHEET NOTE
[] [address]  [address]    [phone]  F: [phone]   [] [address]  [facility] 36   [address]  P: [phone]  F: [phone]  [] [address]  [address] [facility]  P: [phone]  F: [phone] [x] [address]  P: [phone]  F: [phone]  [] [address]  [address] [facility] [address]   [address]   [place]: [phone]  F: [phone]   [] [address]  [place]: [phone]   F: [phone]     Physical Therapy Cancel/No Show note    Date: 7/15/2021  Patient: Nena Hutchinson  : 1960  MRN: [id number]    Cancels/No Shows to date: 1 cx    For today's appointment patient:    [x]  Cancelled    [] Rescheduled appointment    [] No-show     Reason given by patient:    [x]  Patient ill    []  Conflicting appointment    [] No transportation      [] Conflict with work    [] No reason given    [] Weather related    [] COVID-19    [] Other:      Comments:  Patient stated she has a cold and is not feeling well.        [x] Next appointment was confirmed    Electronically signed by: Erica De La Garza

## 2021-07-19 ENCOUNTER — HOSPITAL ENCOUNTER (OUTPATIENT)
Dept: PHYSICAL THERAPY | Facility: CLINIC | Age: 61
Setting detail: THERAPIES SERIES
Discharge: HOME | End: 2021-07-19
Payer: COMMERCIAL

## 2021-07-19 NOTE — FLOWSHEET NOTE
[] [address]  [address]    [phone]  F: [phone]   [] [address]  [place] [address]   [address]  P: [phone]  F: [phone]  [] [place]  [address]  P: [phone]  F: [phone] [x] [address]  P: [phone]  F: [phone]  [] [address]  Psychiatric   [address]   Elida Hire: [phone]  F: [phone]   [] [place] [id number] [facility] [address]  [place]: [phone]   F: [phone]     Physical Therapy Cancel/No Show note    Date: 2021  Patient: Letitia Hill  : 1960  MRN: [id number]    Cancels/No Shows to date: 2 cx    For today's appointment patient:    [x]  Cancelled    [] Rescheduled appointment    [] No-show     Reason given by patient:    [x]  Patient ill    []  Conflicting appointment    [] No transportation      [] Conflict with work    [] No reason given    [] Weather related    [] COVID-19    [] Other:      Comments:  Patient stated she still has a cold and is not feeling well.        [x] Next appointment was confirmed    Electronically signed by: Lb Gauthier

## 2021-07-21 ENCOUNTER — HOSPITAL ENCOUNTER (OUTPATIENT)
Dept: PHYSICAL THERAPY | Facility: CLINIC | Age: 61
Setting detail: THERAPIES SERIES
Discharge: HOME | End: 2021-07-21
Payer: COMMERCIAL

## 2021-07-27 ENCOUNTER — APPOINTMENT (OUTPATIENT)
Dept: PHYSICAL THERAPY | Facility: CLINIC | Age: 61
End: 2021-07-27
Payer: COMMERCIAL

## 2021-08-10 DIAGNOSIS — N18.30 CONTROLLED TYPE 2 DIABETES MELLITUS WITH STAGE 3 CHRONIC KIDNEY DISEASE, WITHOUT LONG-TERM CURRENT USE OF INSULIN (HCC): ICD-10-CM

## 2021-08-10 DIAGNOSIS — E11.22 CONTROLLED TYPE 2 DIABETES MELLITUS WITH STAGE 3 CHRONIC KIDNEY DISEASE, WITHOUT LONG-TERM CURRENT USE OF INSULIN (HCC): ICD-10-CM

## 2021-08-10 NOTE — TELEPHONE ENCOUNTER
Last visit: 4-28-21  Last Med refill: 2-12-21  Does patient have enough medication for 72 hours: No:     Next Visit Date:  Future Appointments   Date Time Provider Jazmin Chan   8/30/2021  9:00 AM JORDAN Hahn - CNP [facility] [address] Maintenance   Topic Date Due    COVID-19 Vaccine (1) Never done    HIV screen  Never done    Shingles Vaccine (1 of 2) Never done    Flu vaccine (1) 09/01/2021    Diabetic foot exam  10/29/2021    A1C test (Diabetic or Prediabetic)  04/26/2022    Lipid screen  04/26/2022    Potassium monitoring  04/26/2022    Creatinine monitoring  04/26/2022    Diabetic retinal exam  05/26/2022    Breast cancer screen  07/16/2022    Colon cancer screen colonoscopy  02/27/2023    Pneumococcal 0-64 years Vaccine (2 of 2 - PPSV23) 06/25/2023    DTaP/Tdap/Td vaccine (3 - Td or Tdap) 09/13/2028    Hepatitis C screen  Completed    Hepatitis A vaccine  Aged Out    Hib vaccine  Aged Out    Meningococcal (ACWY) vaccine  Aged Out       Hemoglobin A1C (%)   Date Value   04/26/2021 6.2 (H)   08/31/2020 7.3 (H)   11/04/2019 6.7 (H)             ( goal A1C is < 7)   Microalb/Crt.  Ratio (mcg/mg creat)   Date Value   04/23/2019 5     LDL Cholesterol (mg/dL)   Date Value   04/26/2021 106   08/31/2020 179 (H)       (goal LDL is <100)   AST (U/L)   Date Value   04/26/2021 17     ALT (U/L)   Date Value   04/26/2021 17     BUN (mg/dL)   Date Value   04/26/2021 14     BP Readings from Last 3 Encounters:   04/28/21 118/84   10/29/20 126/88   11/27/19 138/86          (goal 120/80)    All Future Testing planned in [facility]  Lab Frequency Next Occurrence   MRI HIP LEFT WO CONTRAST Once 10/29/2021               Patient Active Problem List:     Diabetes mellitus 2 with renal manifestations, controlled     Lumbar disc herniation with radiculopathy     Hyperlipidemia     Major depression     Peripheral neuropathy     ARNIE (obstructive sleep apnea)     TMJ arthralgia     GERD (gastroesophageal reflux disease)     Acute pain of left hip

## 2021-08-30 DIAGNOSIS — E78.00 PURE HYPERCHOLESTEROLEMIA: ICD-10-CM

## 2021-08-30 RX ORDER — ATORVASTATIN CALCIUM 80 MG/1
80 TABLET, FILM COATED ORAL DAILY
Qty: 90 TABLET | Refills: 1 | Status: SHIPPED | OUTPATIENT
Start: 2021-08-30 | End: 2021-09-23 | Stop reason: SDUPTHER

## 2021-08-30 NOTE — TELEPHONE ENCOUNTER
Last visit: 4/28/21  Last Med refill:1/7/21  Does patient have enough medication for 72 hours: No:     Next Visit Date:  Future Appointments   Date Time Provider Jazmin Chan   9/23/2021  9:00 AM Pham Hicks, JORDAN - CNP [facility] [address] Maintenance   Topic Date Due    COVID-19 Vaccine (1) Never done    HIV screen  Never done    Shingles Vaccine (1 of 2) Never done    Flu vaccine (1) 09/01/2021    Diabetic foot exam  10/29/2021    A1C test (Diabetic or Prediabetic)  04/26/2022    Lipid screen  04/26/2022    Potassium monitoring  04/26/2022    Creatinine monitoring  04/26/2022    Diabetic retinal exam  05/26/2022    Breast cancer screen  07/16/2022    Colon cancer screen colonoscopy  02/27/2023    Pneumococcal 0-64 years Vaccine (2 of 2 - PPSV23) 06/25/2023    DTaP/Tdap/Td vaccine (3 - Td or Tdap) 09/13/2028    Hepatitis C screen  Completed    Hepatitis A vaccine  Aged Out    Hib vaccine  Aged Out    Meningococcal (ACWY) vaccine  Aged Out       Hemoglobin A1C (%)   Date Value   04/26/2021 6.2 (H)   08/31/2020 7.3 (H)   11/04/2019 6.7 (H)             ( goal A1C is < 7)   Microalb/Crt.  Ratio (mcg/mg creat)   Date Value   04/23/2019 5     LDL Cholesterol (mg/dL)   Date Value   04/26/2021 106   08/31/2020 179 (H)       (goal LDL is <100)   AST (U/L)   Date Value   04/26/2021 17     ALT (U/L)   Date Value   04/26/2021 17     BUN (mg/dL)   Date Value   04/26/2021 14     BP Readings from Last 3 Encounters:   04/28/21 118/84   10/29/20 126/88   11/27/19 138/86          (goal 120/80)    All Future Testing planned in [facility]  Lab Frequency Next Occurrence   MRI HIP LEFT WO CONTRAST Once 10/29/2021               Patient Active Problem List:     Diabetes mellitus 2 with renal manifestations, controlled     Lumbar disc herniation with radiculopathy     Hyperlipidemia     Major depression     Peripheral neuropathy     ARNIE (obstructive sleep apnea)     TMJ arthralgia     GERD (gastroesophageal reflux disease)     Acute pain of left hip

## 2021-08-30 NOTE — TELEPHONE ENCOUNTER
Patient requesting 90 day script plus refills of Lipitor to go to [facility]. Patient unintentionally missed her 08/30/2021 appt. She is scheduled for 09/23/2021 with RUTHIE Baker.

## 2021-09-13 DIAGNOSIS — N18.30 CONTROLLED TYPE 2 DIABETES MELLITUS WITH STAGE 3 CHRONIC KIDNEY DISEASE, WITHOUT LONG-TERM CURRENT USE OF INSULIN (HCC): ICD-10-CM

## 2021-09-13 DIAGNOSIS — E11.22 CONTROLLED TYPE 2 DIABETES MELLITUS WITH STAGE 3 CHRONIC KIDNEY DISEASE, WITHOUT LONG-TERM CURRENT USE OF INSULIN (HCC): ICD-10-CM

## 2021-09-13 NOTE — TELEPHONE ENCOUNTER
LOV 4/28/21  RTO 4 months; F/U scheduled  [facility] 3/17/21    Health Maintenance   Topic Date Due    COVID-19 Vaccine (1) Never done    HIV screen  Never done    Shingles Vaccine (1 of 2) Never done    Flu vaccine (1) 09/01/2021    Diabetic foot exam  10/29/2021    A1C test (Diabetic or Prediabetic)  04/26/2022    Lipid screen  04/26/2022    Potassium monitoring  04/26/2022    Creatinine monitoring  04/26/2022    Diabetic retinal exam  05/26/2022    Breast cancer screen  07/16/2022    Colon cancer screen colonoscopy  02/27/2023    Pneumococcal 0-64 years Vaccine (2 of 2 - PPSV23) 06/25/2023    DTaP/Tdap/Td vaccine (3 - Td or Tdap) 09/13/2028    Hepatitis C screen  Completed    Hepatitis A vaccine  Aged Out    Hib vaccine  Aged Out    Meningococcal (ACWY) vaccine  Aged Out             (applicable per patient's age: Cancer Screenings, Depression Screening, Fall Risk Screening, Immunizations)    Hemoglobin A1C (%)   Date Value   04/26/2021 6.2 (H)   08/31/2020 7.3 (H)   11/04/2019 6.7 (H)     Microalb/Crt.  Ratio (mcg/mg creat)   Date Value   04/23/2019 5     LDL Cholesterol (mg/dL)   Date Value   04/26/2021 106     AST (U/L)   Date Value   04/26/2021 17     ALT (U/L)   Date Value   04/26/2021 17     BUN (mg/dL)   Date Value   04/26/2021 14      (goal A1C is < 7)   (goal LDL is <100) need 30-50% reduction from baseline     BP Readings from Last 3 Encounters:   04/28/21 118/84   10/29/20 126/88   11/27/19 138/86    (goal /80)      All Future Testing planned in [facility]:  Lab Frequency Next Occurrence   MRI HIP LEFT WO CONTRAST Once 10/29/2021       Next Visit Date:  Future Appointments   Date Time Provider Jazmin Chan   9/23/2021  9:00 AM Pham Kunz, APRN - CNP [place] [facility]            Patient Active Problem List:     Diabetes mellitus 2 with renal manifestations, controlled     Lumbar disc herniation with radiculopathy     Hyperlipidemia     Major depression     Peripheral neuropathy ARNIE (obstructive sleep apnea)     TMJ arthralgia     GERD (gastroesophageal reflux disease)     Acute pain of left hip

## 2021-09-23 ENCOUNTER — OFFICE VISIT (OUTPATIENT)
Dept: FAMILY MEDICINE CLINIC | Age: 61
End: 2021-09-23
Payer: COMMERCIAL

## 2021-09-23 VITALS
SYSTOLIC BLOOD PRESSURE: 130 MMHG | BODY MASS INDEX: 30.3 KG/M2 | HEIGHT: 63 IN | DIASTOLIC BLOOD PRESSURE: 80 MMHG | OXYGEN SATURATION: 98 % | WEIGHT: 171 LBS | TEMPERATURE: 98 F | HEART RATE: 67 BPM

## 2021-09-23 DIAGNOSIS — I10 ESSENTIAL HYPERTENSION: ICD-10-CM

## 2021-09-23 DIAGNOSIS — E78.00 PURE HYPERCHOLESTEROLEMIA: ICD-10-CM

## 2021-09-23 DIAGNOSIS — E11.22 CONTROLLED TYPE 2 DIABETES MELLITUS WITH STAGE 3 CHRONIC KIDNEY DISEASE, WITHOUT LONG-TERM CURRENT USE OF INSULIN (HCC): ICD-10-CM

## 2021-09-23 DIAGNOSIS — M51.16 LUMBAR DISC HERNIATION WITH RADICULOPATHY: ICD-10-CM

## 2021-09-23 DIAGNOSIS — N18.30 CONTROLLED TYPE 2 DIABETES MELLITUS WITH STAGE 3 CHRONIC KIDNEY DISEASE, WITHOUT LONG-TERM CURRENT USE OF INSULIN (HCC): ICD-10-CM

## 2021-09-23 DIAGNOSIS — Z23 NEED FOR INFLUENZA VACCINATION: ICD-10-CM

## 2021-09-23 DIAGNOSIS — M25.552 ACUTE PAIN OF LEFT HIP: Primary | ICD-10-CM

## 2021-09-23 DIAGNOSIS — F33.1 MODERATE EPISODE OF RECURRENT MAJOR DEPRESSIVE DISORDER (HCC): ICD-10-CM

## 2021-09-23 PROCEDURE — 99215 OFFICE O/P EST HI 40 MIN: CPT | Performed by: NURSE PRACTITIONER

## 2021-09-23 PROCEDURE — 90674 CCIIV4 VAC NO PRSV 0.5 ML IM: CPT | Performed by: NURSE PRACTITIONER

## 2021-09-23 PROCEDURE — 3017F COLORECTAL CA SCREEN DOC REV: CPT | Performed by: NURSE PRACTITIONER

## 2021-09-23 PROCEDURE — 90471 IMMUNIZATION ADMIN: CPT | Performed by: NURSE PRACTITIONER

## 2021-09-23 PROCEDURE — 2022F DILAT RTA XM EVC RTNOPTHY: CPT | Performed by: NURSE PRACTITIONER

## 2021-09-23 PROCEDURE — 3044F HG A1C LEVEL LT 7.0%: CPT | Performed by: NURSE PRACTITIONER

## 2021-09-23 PROCEDURE — G8427 DOCREV CUR MEDS BY ELIG CLIN: HCPCS | Performed by: NURSE PRACTITIONER

## 2021-09-23 PROCEDURE — G8417 CALC BMI ABV UP PARAM F/U: HCPCS | Performed by: NURSE PRACTITIONER

## 2021-09-23 PROCEDURE — 1036F TOBACCO NON-USER: CPT | Performed by: NURSE PRACTITIONER

## 2021-09-23 RX ORDER — LISINOPRIL 40 MG/1
TABLET ORAL
Qty: 90 TABLET | Refills: 1 | Status: SHIPPED | OUTPATIENT
Start: 2021-09-23 | End: 2021-10-18

## 2021-09-23 RX ORDER — DULOXETIN HYDROCHLORIDE 20 MG/1
20 CAPSULE, DELAYED RELEASE ORAL NIGHTLY
Qty: 30 CAPSULE | Refills: 0 | Status: SHIPPED | OUTPATIENT
Start: 2021-09-23 | End: 2021-11-05

## 2021-09-23 RX ORDER — DULOXETIN HYDROCHLORIDE 20 MG/1
20 CAPSULE, DELAYED RELEASE ORAL NIGHTLY
Qty: 90 CAPSULE | Refills: 1 | Status: SHIPPED | OUTPATIENT
Start: 2021-09-23 | End: 2021-11-06 | Stop reason: SDUPTHER

## 2021-09-23 RX ORDER — DULOXETIN HYDROCHLORIDE 60 MG/1
CAPSULE, DELAYED RELEASE ORAL
Qty: 90 CAPSULE | Refills: 1 | Status: SHIPPED | OUTPATIENT
Start: 2021-09-23 | End: 2022-01-04 | Stop reason: SDUPTHER

## 2021-09-23 RX ORDER — ATORVASTATIN CALCIUM 80 MG/1
80 TABLET, FILM COATED ORAL DAILY
Qty: 90 TABLET | Refills: 1 | Status: SHIPPED | OUTPATIENT
Start: 2021-09-23 | End: 2021-11-24 | Stop reason: SDUPTHER

## 2021-09-23 SDOH — ECONOMIC STABILITY: FOOD INSECURITY: WITHIN THE PAST 12 MONTHS, YOU WORRIED THAT YOUR FOOD WOULD RUN OUT BEFORE YOU GOT MONEY TO BUY MORE.: NEVER TRUE

## 2021-09-23 SDOH — ECONOMIC STABILITY: FOOD INSECURITY: WITHIN THE PAST 12 MONTHS, THE FOOD YOU BOUGHT JUST DIDN'T LAST AND YOU DIDN'T HAVE MONEY TO GET MORE.: NEVER TRUE

## 2021-09-23 ASSESSMENT — ENCOUNTER SYMPTOMS
WHEEZING: 0
TROUBLE SWALLOWING: 0
SORE THROAT: 0
SINUS PRESSURE: 0
ABDOMINAL DISTENTION: 0
RHINORRHEA: 0
CHEST TIGHTNESS: 0
DIARRHEA: 0
CONSTIPATION: 0
COUGH: 0
SHORTNESS OF BREATH: 0
ABDOMINAL PAIN: 0
BLOOD IN STOOL: 0
BACK PAIN: 1
NAUSEA: 0

## 2021-09-23 ASSESSMENT — SOCIAL DETERMINANTS OF HEALTH (SDOH): HOW HARD IS IT FOR YOU TO PAY FOR THE VERY BASICS LIKE FOOD, HOUSING, MEDICAL CARE, AND HEATING?: NOT HARD AT ALL

## 2021-09-23 NOTE — PROGRESS NOTES
[address]  [phone]    9/23/21     Patient ID  Ubaldo De Santiago is a 59 y.o. female  Established patient    Chief Complaint  Ubaldo De Santiago presents today for Hypertension and Hyperlipidemia      ASSESSMENT/PLAN  1. Acute pain of left hip  -     MRI HIP LEFT WO CONTRAST; Future  2. Lumbar disc herniation with radiculopathy  -     MRI LUMBAR SPINE WO CONTRAST; Future  3. Moderate episode of recurrent major depressive disorder (HCC)  -     DULoxetine (CYMBALTA) 20 MG extended release capsule; Take 1 capsule by mouth nightly, Disp-90 capsule, R-1Normal  -     DULoxetine (CYMBALTA) 20 MG extended release capsule; Take 1 capsule by mouth nightly, Disp-30 capsule, R-0Normal  -     DULoxetine (CYMBALTA) 60 MG extended release capsule; TAKE 1 CAPSULE DAILY, Disp-90 capsule, R-1Normal  4. Essential hypertension  -     lisinopril (PRINIVIL;ZESTRIL) 40 MG tablet; TAKE 1 TABLET DAILY, Disp-90 tablet, R-1Normal  5. Pure hypercholesterolemia  -     atorvastatin (LIPITOR) 80 MG tablet; Take 1 tablet by mouth daily New dose as discussed, Disp-90 tablet, R-1Normal  6. Controlled type 2 diabetes mellitus with stage 3 chronic kidney disease, without long-term current use of insulin ([facility] [address]  -     [facility] Order for Glucometer as OP  -     HM DIABETES FOOT EXAM  7. Need for influenza vaccination  -     INFLUENZA, MDCK QUADV, 2 YRS AND OLDER, IM, PF, PREFILL SYR OR SDV, 0.5ML (FLUCELVAX QUADV, PF)     Add Cymbalta at night  Mri hip and lumbar- PT completed  Possible ortho refer? Labs in 6 months     Return in about 6 months (around 3/23/2022) for Back pain. On this date 9/23/2021 I have spent 45+ minutes reviewing previous notes, test results and face to face with the patient discussing the diagnosis and importance of compliance with the treatment plan as well as documenting on the day of the visit.     Patient Care Team:  JORDAN Solis - CNP as PCP - General (Nurse Practitioner Family)  JORDAN Morris - CNP as PCP - [facility] Empaneled Provider  Keshawn Cavazos MD as Physician (Dermatology)  Nikhil Jerez as Physician (Gynecology)  Larna Galeazzi, PhD (Psychology)    SUBJECTIVE/OBJECTIVE  History of Present illness / Visit Summary     Have you seen any other physician or provider since your last visit? Yes - Records Obtained  Have you had any other diagnostic tests since your last visit? No  Have you been seen in the emergency room and/or had an admission to a hospital since we last saw you? No    Still pain to hip and lower back  completed PT - states made worse  Continue numbness to lower extremity       Review of Systems  Review of Systems   Constitutional: Positive for fatigue. Negative for activity change, appetite change, chills and fever. HENT: Negative for congestion, ear pain, postnasal drip, rhinorrhea, sinus pressure, sneezing, sore throat and trouble swallowing. Respiratory: Negative for cough, chest tightness, shortness of breath and wheezing. ARNIE, no CPAP, wears mouth guard    Cardiovascular: Negative for chest pain, palpitations and leg swelling. Gastrointestinal: Negative for abdominal distention, abdominal pain, blood in stool, constipation, diarrhea (loose ) and nausea. Hx Diverticulosis. Upper and lower scopes in past     Genitourinary: Negative for difficulty urinating, dysuria, frequency, hematuria and urgency. Musculoskeletal: Positive for arthralgias (left hip, left knee) and back pain (chronic ). Negative for gait problem, joint swelling and myalgias. Wears knee brace, OTC    Skin: Negative for rash and wound. Follows with dermatology   Area of concern, left temple, scraped. Results benign    Allergic/Immunologic: Positive for environmental allergies. Negative for food allergies. Neurological: Positive for numbness (left lower extremity ). Negative for dizziness, syncope, light-headedness and headaches. Speech normal.         Behavior: Behavior normal. Behavior is cooperative. Thought Content: Thought content normal. Thought content does not include suicidal ideation. Thought content does not include suicidal plan. Cognition and Memory: Cognition and memory normal.         Judgment: Judgment normal.           Electronically signed by JORDAN Handy CNP, MARIE on 9/30/2021 at 10:44 PM    Please note that this chart was generated using voice recognition Dragon dictation software. Although every effort was made to ensure the accuracy of this automated transcription, some errors in transcription may have occurred.

## 2021-09-23 NOTE — PATIENT INSTRUCTIONS
have already gotten another type of shingles vaccine, the live shingles vaccine. There is no live virus in this vaccine. Shingles vaccine may be given at the same time as other vaccines. Talk with your health care provider  Tell your vaccine provider if the person getting the vaccine:  · Has had an allergic reaction after a previous dose of recombinant shingles vaccine, or has any severe, life-threatening allergies. · Is pregnant or breastfeeding. · Is currently experiencing an episode of shingles. In some cases, your health care provider may decide to postpone shingles vaccination to a future visit. People with minor illnesses, such as a cold, may be vaccinated. People who are moderately or severely ill should usually wait until they recover before getting recombinant shingles vaccine. Your health care provider can give you more information. Risks of a vaccine reaction  · A sore arm with mild or moderate pain is very common after recombinant shingles vaccine, affecting about 80% of vaccinated people. Redness and swelling can also happen at the site of the injection. · Tiredness, muscle pain, headache, shivering, fever, stomach pain, and nausea happen after vaccination in more than half of people who receive recombinant shingles vaccine. In clinical trials, about 1 out of 6 people who got recombinant zoster vaccine experienced side effects that prevented them from doing regular activities. Symptoms usually went away on their own in 2 to 3 days. You should still get the second dose of recombinant zoster vaccine even if you had one of these reactions after the first dose. People sometimes faint after medical procedures, including vaccination. Tell your provider if you feel dizzy or have vision changes or ringing in the ears. As with any medicine, there is a very remote chance of a vaccine causing a severe allergic reaction, other serious injury, or death. What if there is a serious problem?   An allergic reaction could occur after the vaccinated person leaves the clinic. If you see signs of a severe allergic reaction (hives, swelling of the face and throat, difficulty breathing, a fast heartbeat, dizziness, or weakness), call 9-1-1 and get the person to the nearest hospital.  For other signs that concern you, call your health care provider. Adverse reactions should be reported to the Vaccine Adverse Event Reporting System (VAERS). Your health care provider will usually file this report, or you can do it yourself. Visit the VAERS website at www.vaers. [facility].gov or call 5-594.527.3560. VAERS is only for reporting reactions, and VAERS staff do not give medical advice. How can I learn more? · Ask your health care provider. · Call your local or state health department. · Contact the Centers for Disease Control and Prevention (CDC):  ? Call 7-202.901.8429 (1-800-CDC-INFO) or  ? Visit CDC's website at www.cdc.gov/vaccines  Vaccine Information Statement  Recombinant Zoster Vaccine  10/30/2019  [facility] and [facility] for Disease Control and Prevention  Many Vaccine Information Statements are available in Indonesian and other languages. See www.immunize.org/vis. Hojas de Información Sobre Vacunas están disponibles en Español y en muchos otros idiomas. Visite Selvin.si   Care instructions adapted under license by [facility] ([facility]). If you have questions about a medical condition or this instruction, always ask your healthcare professional. [place] [address] any warranty or liability for your use of this information.

## 2021-09-23 NOTE — PROGRESS NOTES
Vaccine Information Sheet, \"Influenza - Inactivated\"  given to Gege Lin, or parent/legal guardian of  Gege Lin and verbalized understanding. Patient responses:    Have you ever had a reaction to a flu vaccine? No  Do you have any current illness? No  Have you ever had [place] Syndrome? No  Do you have a serious allergy to any of the following: Neomycin, Polymyxin, Thimerosal, eggs or egg products? No    Flu vaccine given per order. Please see immunization tab. Risks and benefits explained. Current VIS given.       Immunizations Administered     Name Date Dose Route    Influenza, MDCK Quadv, IM, PF (Flucelvax 2 yrs and older) 9/23/2021 0.5 mL Intramuscular    Site: Deltoid- Left    Lot: [id number]    NDC: 43625-714-81

## 2021-10-15 DIAGNOSIS — I10 ESSENTIAL HYPERTENSION: ICD-10-CM

## 2021-10-18 RX ORDER — LISINOPRIL 40 MG/1
TABLET ORAL
Qty: 30 TABLET | Refills: 0 | Status: SHIPPED | OUTPATIENT
Start: 2021-10-18 | End: 2021-11-21 | Stop reason: SDUPTHER

## 2021-10-18 NOTE — TELEPHONE ENCOUNTER
9/23/21 script sent to the local pharmacy. Please send to mail order. LOV 9/23/21  RTO 6 mo; F/U scheduled  LRF 9/23/21    Health Maintenance   Topic Date Due    HIV screen  Never done    Shingles Vaccine (1 of 2) 09/23/2022 (Originally 6/25/2008)    A1C test (Diabetic or Prediabetic)  04/26/2022    Lipid screen  04/26/2022    Potassium monitoring  04/26/2022    Creatinine monitoring  04/26/2022    Diabetic retinal exam  05/26/2022    Breast cancer screen  07/16/2022    Diabetic foot exam  09/23/2022    Colon cancer screen colonoscopy  02/27/2023    Pneumococcal 0-64 years Vaccine (2 of 2 - PPSV23) 06/25/2023    DTaP/Tdap/Td vaccine (3 - Td or Tdap) 09/13/2028    Flu vaccine  Completed    COVID-19 Vaccine  Completed    Hepatitis C screen  Completed    Hepatitis A vaccine  Aged Out    Hib vaccine  Aged Out    Meningococcal (ACWY) vaccine  Aged Out             (applicable per patient's age: Cancer Screenings, Depression Screening, Fall Risk Screening, Immunizations)    Hemoglobin A1C (%)   Date Value   04/26/2021 6.2 (H)   08/31/2020 7.3 (H)   11/04/2019 6.7 (H)     Microalb/Crt.  Ratio (mcg/mg creat)   Date Value   04/23/2019 5     LDL Cholesterol (mg/dL)   Date Value   04/26/2021 106     AST (U/L)   Date Value   04/26/2021 17     ALT (U/L)   Date Value   04/26/2021 17     BUN (mg/dL)   Date Value   04/26/2021 14      (goal A1C is < 7)   (goal LDL is <100) need 30-50% reduction from baseline     BP Readings from Last 3 Encounters:   09/23/21 130/80   04/28/21 118/84   10/29/20 126/88    (goal /80)      All Future Testing planned in [facility]:  Lab Frequency Next Occurrence   MRI HIP LEFT WO CONTRAST Once 10/26/2021   MRI LUMBAR SPINE WO CONTRAST Once 10/26/2021       Next Visit Date:  Future Appointments   Date Time Provider Jazmin Chan   3/23/2022  9:00 AM Amy Corrinne Bedford, APRN - LARISSA Page [facility]            Patient Active Problem List:     Diabetes mellitus 2 with renal manifestations, controlled     Lumbar disc herniation with radiculopathy     Hyperlipidemia     Major depression     Peripheral neuropathy     ARNIE (obstructive sleep apnea)     TMJ arthralgia     GERD (gastroesophageal reflux disease)     Acute pain of left hip

## 2021-11-05 DIAGNOSIS — F33.1 MODERATE EPISODE OF RECURRENT MAJOR DEPRESSIVE DISORDER (HCC): ICD-10-CM

## 2021-11-05 NOTE — TELEPHONE ENCOUNTER
Sent to local pharmacy on 9/23/21. Writer did cancel. LOV 9/23/21  RTO 6 months; F/U scheduled      Health Maintenance   Topic Date Due    HIV screen  Never done    Shingles Vaccine (1 of 2) 09/23/2022 (Originally 6/25/2008)    A1C test (Diabetic or Prediabetic)  04/26/2022    Lipid screen  04/26/2022    Potassium monitoring  04/26/2022    Creatinine monitoring  04/26/2022    Diabetic retinal exam  05/26/2022    Breast cancer screen  07/16/2022    Diabetic foot exam  09/23/2022    Colon cancer screen colonoscopy  02/27/2023    Pneumococcal 0-64 years Vaccine (2 of 2 - PPSV23) 06/25/2023    DTaP/Tdap/Td vaccine (3 - Td or Tdap) 09/13/2028    Flu vaccine  Completed    COVID-19 Vaccine  Completed    Hepatitis C screen  Completed    Hepatitis A vaccine  Aged Out    Hib vaccine  Aged Out    Meningococcal (ACWY) vaccine  Aged Out             (applicable per patient's age: Cancer Screenings, Depression Screening, Fall Risk Screening, Immunizations)    Hemoglobin A1C (%)   Date Value   04/26/2021 6.2 (H)   08/31/2020 7.3 (H)   11/04/2019 6.7 (H)     Microalb/Crt.  Ratio (mcg/mg creat)   Date Value   04/23/2019 5     LDL Cholesterol (mg/dL)   Date Value   04/26/2021 106     AST (U/L)   Date Value   04/26/2021 17     ALT (U/L)   Date Value   04/26/2021 17     BUN (mg/dL)   Date Value   04/26/2021 14      (goal A1C is < 7)   (goal LDL is <100) need 30-50% reduction from baseline     BP Readings from Last 3 Encounters:   09/23/21 130/80   04/28/21 118/84   10/29/20 126/88    (goal /80)      All Future Testing planned in [facility]:  Lab Frequency Next Occurrence   MRI HIP LEFT WO CONTRAST Once 10/26/2021   MRI LUMBAR SPINE WO CONTRAST Once 10/26/2021       Next Visit Date:  Future Appointments   Date Time Provider Jazmin Chan   3/23/2022  9:00 AM Pham Loevtt, APRN - CNP [facility]            Patient Active Problem List:     Diabetes mellitus 2 with renal manifestations, controlled Lumbar disc herniation with radiculopathy     Hyperlipidemia     Major depression     Peripheral neuropathy     ARNIE (obstructive sleep apnea)     TMJ arthralgia     GERD (gastroesophageal reflux disease)     Acute pain of left hip

## 2021-11-06 RX ORDER — DULOXETIN HYDROCHLORIDE 20 MG/1
20 CAPSULE, DELAYED RELEASE ORAL NIGHTLY
Qty: 90 CAPSULE | Refills: 1 | Status: SHIPPED | OUTPATIENT
Start: 2021-11-06 | End: 2022-02-07 | Stop reason: SDUPTHER

## 2021-11-21 DIAGNOSIS — I10 ESSENTIAL HYPERTENSION: ICD-10-CM

## 2021-11-23 NOTE — TELEPHONE ENCOUNTER
herniation with radiculopathy     Hyperlipidemia     Major depression     Peripheral neuropathy     ARNIE (obstructive sleep apnea)     TMJ arthralgia     GERD (gastroesophageal reflux disease)     Acute pain of left hip

## 2021-11-24 DIAGNOSIS — E78.00 PURE HYPERCHOLESTEROLEMIA: ICD-10-CM

## 2021-11-24 RX ORDER — ATORVASTATIN CALCIUM 80 MG/1
80 TABLET, FILM COATED ORAL DAILY
Qty: 90 TABLET | Refills: 1 | Status: SHIPPED | OUTPATIENT
Start: 2021-11-24 | End: 2022-02-07 | Stop reason: SDUPTHER

## 2021-11-24 RX ORDER — LISINOPRIL 40 MG/1
40 TABLET ORAL DAILY
Qty: 90 TABLET | Refills: 1 | Status: SHIPPED | OUTPATIENT
Start: 2021-11-24 | End: 2022-02-07 | Stop reason: SDUPTHER

## 2021-11-24 NOTE — TELEPHONE ENCOUNTER
Pt requesting medication to MAIL order pharmacy. LOV 9/23/21  RTO 6 months; F/U scheduled  [facility] 9/23/21    Health Maintenance   Topic Date Due    HIV screen  Never done    COVID-19 Vaccine (2 - Booster for [employer] series) 05/05/2021    Shingles Vaccine (1 of 2) 09/23/2022 (Originally 6/25/2008)    A1C test (Diabetic or Prediabetic)  04/26/2022    Lipid screen  04/26/2022    Potassium monitoring  04/26/2022    Creatinine monitoring  04/26/2022    Diabetic retinal exam  05/26/2022    Breast cancer screen  07/16/2022    Diabetic foot exam  09/23/2022    Colon cancer screen colonoscopy  02/27/2023    Pneumococcal 0-64 years Vaccine (2 of 2 - PPSV23) 06/25/2023    DTaP/Tdap/Td vaccine (3 - Td or Tdap) 09/13/2028    Flu vaccine  Completed    Hepatitis C screen  Completed    Hepatitis A vaccine  Aged Out    Hib vaccine  Aged Out    Meningococcal (ACWY) vaccine  Aged Out             (applicable per patient's age: Cancer Screenings, Depression Screening, Fall Risk Screening, Immunizations)    Hemoglobin A1C (%)   Date Value   04/26/2021 6.2 (H)   08/31/2020 7.3 (H)   11/04/2019 6.7 (H)     Microalb/Crt.  Ratio (mcg/mg creat)   Date Value   04/23/2019 5     LDL Cholesterol (mg/dL)   Date Value   04/26/2021 106     AST (U/L)   Date Value   04/26/2021 17     ALT (U/L)   Date Value   04/26/2021 17     BUN (mg/dL)   Date Value   04/26/2021 14      (goal A1C is < 7)   (goal LDL is <100) need 30-50% reduction from baseline     BP Readings from Last 3 Encounters:   09/23/21 130/80   04/28/21 118/84   10/29/20 126/88    (goal /80)      All Future Testing planned in [facility]:  Lab Frequency Next Occurrence   MRI HIP LEFT WO CONTRAST Once 11/24/2021   MRI LUMBAR SPINE WO CONTRAST Once 11/24/2021       Next Visit Date:  Future Appointments   Date Time Provider Jazmin Chan   3/23/2022  9:00 AM Amy Allie Mcardle, APRN - LARISSA Grimes [facility]            Patient Active Problem List:     Diabetes mellitus 2 with renal manifestations, controlled     Lumbar disc herniation with radiculopathy     Hyperlipidemia     Major depression     Peripheral neuropathy     ARNIE (obstructive sleep apnea)     TMJ arthralgia     GERD (gastroesophageal reflux disease)     Acute pain of left hip

## 2021-12-29 DIAGNOSIS — K21.9 GASTROESOPHAGEAL REFLUX DISEASE: ICD-10-CM

## 2021-12-29 RX ORDER — OMEPRAZOLE 20 MG/1
CAPSULE, DELAYED RELEASE ORAL
Qty: 90 CAPSULE | Refills: 3 | Status: SHIPPED | OUTPATIENT
Start: 2021-12-29 | End: 2022-02-07 | Stop reason: SDUPTHER

## 2021-12-29 NOTE — TELEPHONE ENCOUNTER
Last visit: 9/23/21  Last Med refill: 1/4/21  Does patient have enough medication for 72 hours: Yes    Next Visit Date:  Future Appointments   Date Time Provider Jazmin Chan   3/23/2022  9:00 AM JORDAN Ricks CNP [address] Maintenance   Topic Date Due    HIV screen  Never done    COVID-19 Vaccine (2 - Booster for [employer] series) 05/05/2021    Shingles Vaccine (1 of 2) 09/23/2022 (Originally 6/25/2008)    A1C test (Diabetic or Prediabetic)  04/26/2022    Lipid screen  04/26/2022    Potassium monitoring  04/26/2022    Creatinine monitoring  04/26/2022    Diabetic retinal exam  05/26/2022    Breast cancer screen  07/16/2022    Diabetic foot exam  09/23/2022    Colon cancer screen colonoscopy  02/27/2023    Pneumococcal 0-64 years Vaccine (2 of 2 - PPSV23) 06/25/2023    DTaP/Tdap/Td vaccine (3 - Td or Tdap) 09/13/2028    Flu vaccine  Completed    Hepatitis C screen  Completed    Hepatitis A vaccine  Aged Out    Hib vaccine  Aged Out    Meningococcal (ACWY) vaccine  Aged Out       Hemoglobin A1C (%)   Date Value   04/26/2021 6.2 (H)   08/31/2020 7.3 (H)   11/04/2019 6.7 (H)             ( goal A1C is < 7)   Microalb/Crt.  Ratio (mcg/mg creat)   Date Value   04/23/2019 5     LDL Cholesterol (mg/dL)   Date Value   04/26/2021 106   08/31/2020 179 (H)       (goal LDL is <100)   AST (U/L)   Date Value   04/26/2021 17     ALT (U/L)   Date Value   04/26/2021 17     BUN (mg/dL)   Date Value   04/26/2021 14     BP Readings from Last 3 Encounters:   09/23/21 130/80   04/28/21 118/84   10/29/20 126/88          (goal 120/80)    All Future Testing planned in [facility]  Lab Frequency Next Occurrence   MRI HIP LEFT WO CONTRAST Once 11/24/2021   MRI LUMBAR SPINE WO CONTRAST Once 11/24/2021               Patient Active Problem List:     Diabetes mellitus 2 with renal manifestations, controlled     Lumbar disc herniation with radiculopathy     Hyperlipidemia     Major depression Peripheral neuropathy     ARNIE (obstructive sleep apnea)     TMJ arthralgia     GERD (gastroesophageal reflux disease)     Acute pain of left hip

## 2022-01-02 ENCOUNTER — PATIENT MESSAGE (OUTPATIENT)
Dept: FAMILY MEDICINE CLINIC | Age: 62
End: 2022-01-02

## 2022-01-02 DIAGNOSIS — F33.1 MODERATE EPISODE OF RECURRENT MAJOR DEPRESSIVE DISORDER (HCC): ICD-10-CM

## 2022-01-03 NOTE — TELEPHONE ENCOUNTER
Please advise if appropriate. LOV 9/23/21  RTO 6 months; F/U scheduled  [facility] 9/23/21    Health Maintenance   Topic Date Due    Depression Monitoring  Never done    HIV screen  Never done    COVID-19 Vaccine (2 - Booster for [employer] series) 05/05/2021    Shingles Vaccine (1 of 2) 09/23/2022 (Originally 6/25/2008)    A1C test (Diabetic or Prediabetic)  04/26/2022    Lipid screen  04/26/2022    Potassium monitoring  04/26/2022    Creatinine monitoring  04/26/2022    Diabetic retinal exam  05/26/2022    Breast cancer screen  07/16/2022    Diabetic foot exam  09/23/2022    Colon cancer screen colonoscopy  02/27/2023    Pneumococcal 0-64 years Vaccine (2 of 2 - PPSV23) 06/25/2023    DTaP/Tdap/Td vaccine (3 - Td or Tdap) 09/13/2028    Flu vaccine  Completed    Hepatitis C screen  Completed    Hepatitis A vaccine  Aged Out    Hib vaccine  Aged Out    Meningococcal (ACWY) vaccine  Aged Out             (applicable per patient's age: Cancer Screenings, Depression Screening, Fall Risk Screening, Immunizations)    Hemoglobin A1C (%)   Date Value   04/26/2021 6.2 (H)   08/31/2020 7.3 (H)   11/04/2019 6.7 (H)     Microalb/Crt.  Ratio (mcg/mg creat)   Date Value   04/23/2019 5     LDL Cholesterol (mg/dL)   Date Value   04/26/2021 106     AST (U/L)   Date Value   04/26/2021 17     ALT (U/L)   Date Value   04/26/2021 17     BUN (mg/dL)   Date Value   04/26/2021 14      (goal A1C is < 7)   (goal LDL is <100) need 30-50% reduction from baseline     BP Readings from Last 3 Encounters:   09/23/21 130/80   04/28/21 118/84   10/29/20 126/88    (goal /80)      All Future Testing planned in [facility]:  Lab Frequency Next Occurrence   MRI HIP LEFT WO CONTRAST Once 11/24/2021   MRI LUMBAR SPINE WO CONTRAST Once 11/24/2021       Next Visit Date:  Future Appointments   Date Time Provider Jazmin Chan   3/23/2022  9:00 AM Pham Davis APRN - CNP Diet[facility] Self [facility]            Patient Active Problem List: Diabetes mellitus 2 with renal manifestations, controlled     Lumbar disc herniation with radiculopathy     Hyperlipidemia     Major depression     Peripheral neuropathy     ARNIE (obstructive sleep apnea)     TMJ arthralgia     GERD (gastroesophageal reflux disease)     Acute pain of left hip

## 2022-01-04 RX ORDER — DULOXETIN HYDROCHLORIDE 60 MG/1
CAPSULE, DELAYED RELEASE ORAL
Qty: 90 CAPSULE | Refills: 1 | Status: SHIPPED | OUTPATIENT
Start: 2022-01-04 | End: 2022-03-23 | Stop reason: SDUPTHER

## 2022-02-03 ENCOUNTER — TELEPHONE (OUTPATIENT)
Dept: FAMILY MEDICINE CLINIC | Age: 62
End: 2022-02-03

## 2022-02-03 NOTE — TELEPHONE ENCOUNTER
----- Message from Karla Isbell sent at 2/3/2022 11:00 AM EST -----  Subject: Message to Provider    QUESTIONS  Information for Provider? Miguelina Aguero would like to know what blood type she is.     ---------------------------------------------------------------------------  --------------  CALL BACK INFO  What is the best way for the office to contact you? OK to leave message on   voicemail  Preferred Call Back Phone Number? [phone]  ---------------------------------------------------------------------------  --------------  SCRIPT ANSWERS  Relationship to Patient?  Self

## 2022-02-03 NOTE — TELEPHONE ENCOUNTER
----- Message from Edin Nguyen sent at 2/3/2022 11:00 AM EST -----  Subject: Message to Provider    QUESTIONS  Information for Provider? Aguila Brown would like to know what blood type she is.     ---------------------------------------------------------------------------  --------------  CALL BACK INFO  What is the best way for the office to contact you? OK to leave message on   voicemail  Preferred Call Back Phone Number? [phone]  ---------------------------------------------------------------------------  --------------  SCRIPT ANSWERS  Relationship to Patient?  Self

## 2022-02-04 ENCOUNTER — PATIENT MESSAGE (OUTPATIENT)
Dept: FAMILY MEDICINE CLINIC | Age: 62
End: 2022-02-04

## 2022-02-04 DIAGNOSIS — E11.22 CONTROLLED TYPE 2 DIABETES MELLITUS WITH STAGE 3 CHRONIC KIDNEY DISEASE, WITHOUT LONG-TERM CURRENT USE OF INSULIN (HCC): ICD-10-CM

## 2022-02-04 DIAGNOSIS — F33.1 MODERATE EPISODE OF RECURRENT MAJOR DEPRESSIVE DISORDER (HCC): ICD-10-CM

## 2022-02-04 DIAGNOSIS — E78.00 PURE HYPERCHOLESTEROLEMIA: ICD-10-CM

## 2022-02-04 DIAGNOSIS — I10 ESSENTIAL HYPERTENSION: ICD-10-CM

## 2022-02-04 DIAGNOSIS — K21.9 GASTROESOPHAGEAL REFLUX DISEASE: ICD-10-CM

## 2022-02-04 DIAGNOSIS — N18.30 CONTROLLED TYPE 2 DIABETES MELLITUS WITH STAGE 3 CHRONIC KIDNEY DISEASE, WITHOUT LONG-TERM CURRENT USE OF INSULIN (HCC): ICD-10-CM

## 2022-02-07 DIAGNOSIS — N18.30 CONTROLLED TYPE 2 DIABETES MELLITUS WITH STAGE 3 CHRONIC KIDNEY DISEASE, WITHOUT LONG-TERM CURRENT USE OF INSULIN (HCC): ICD-10-CM

## 2022-02-07 DIAGNOSIS — E11.22 CONTROLLED TYPE 2 DIABETES MELLITUS WITH STAGE 3 CHRONIC KIDNEY DISEASE, WITHOUT LONG-TERM CURRENT USE OF INSULIN (HCC): ICD-10-CM

## 2022-02-07 NOTE — TELEPHONE ENCOUNTER
Insurance info given to  and medication refills pending. LOV 9/23/21  RTO 6 months; F/U scheduled  LRF 12/29/21, 11/24/21, 11/6/21, 9/14/21, 8/11/21    Health Maintenance   Topic Date Due    Depression Monitoring  Never done    HIV screen  Never done    COVID-19 Vaccine (2 - Booster for [employer] series) 05/05/2021    Shingles Vaccine (1 of 2) 09/23/2022 (Originally 6/25/2008)    A1C test (Diabetic or Prediabetic)  04/26/2022    Lipid screen  04/26/2022    Potassium monitoring  04/26/2022    Creatinine monitoring  04/26/2022    Diabetic retinal exam  05/26/2022    Breast cancer screen  07/16/2022    Diabetic foot exam  09/23/2022    Colon cancer screen colonoscopy  02/27/2023    Pneumococcal 0-64 years Vaccine (2 of 2 - PPSV23) 06/25/2023    DTaP/Tdap/Td vaccine (3 - Td or Tdap) 09/13/2028    Flu vaccine  Completed    Hepatitis C screen  Completed    Hepatitis A vaccine  Aged Out    Hib vaccine  Aged Out    Meningococcal (ACWY) vaccine  Aged Out             (applicable per patient's age: Cancer Screenings, Depression Screening, Fall Risk Screening, Immunizations)    Hemoglobin A1C (%)   Date Value   04/26/2021 6.2 (H)   08/31/2020 7.3 (H)   11/04/2019 6.7 (H)     Microalb/Crt.  Ratio (mcg/mg creat)   Date Value   04/23/2019 5     LDL Cholesterol (mg/dL)   Date Value   04/26/2021 106     AST (U/L)   Date Value   04/26/2021 17     ALT (U/L)   Date Value   04/26/2021 17     BUN (mg/dL)   Date Value   04/26/2021 14      (goal A1C is < 7)   (goal LDL is <100) need 30-50% reduction from baseline     BP Readings from Last 3 Encounters:   09/23/21 130/80   04/28/21 118/84   10/29/20 126/88    (goal /80)      All Future Testing planned in [facility]:  Lab Frequency Next Occurrence   MRI HIP LEFT WO CONTRAST Once 11/24/2021   MRI LUMBAR SPINE WO CONTRAST Once 11/24/2021       Next Visit Date:  Future Appointments   Date Time Provider Jazmin Chan   3/23/2022  9:00 AM JORDAN Vega - [facility] [facility]            Patient Active Problem List:     Diabetes mellitus 2 with renal manifestations, controlled     Lumbar disc herniation with radiculopathy     Hyperlipidemia     Major depression     Peripheral neuropathy     ARNIE (obstructive sleep apnea)     TMJ arthralgia     GERD (gastroesophageal reflux disease)     Acute pain of left hip

## 2022-02-08 RX ORDER — LISINOPRIL 40 MG/1
40 TABLET ORAL DAILY
Qty: 90 TABLET | Refills: 1 | Status: SHIPPED | OUTPATIENT
Start: 2022-02-08 | End: 2022-09-01

## 2022-02-08 RX ORDER — OMEPRAZOLE 20 MG/1
20 CAPSULE, DELAYED RELEASE ORAL DAILY
Qty: 90 CAPSULE | Refills: 1 | Status: SHIPPED | OUTPATIENT
Start: 2022-02-08 | End: 2022-08-07

## 2022-02-08 RX ORDER — ATORVASTATIN CALCIUM 80 MG/1
80 TABLET, FILM COATED ORAL DAILY
Qty: 90 TABLET | Refills: 1 | Status: SHIPPED | OUTPATIENT
Start: 2022-02-08 | End: 2022-08-07

## 2022-02-08 RX ORDER — DULOXETIN HYDROCHLORIDE 20 MG/1
20 CAPSULE, DELAYED RELEASE ORAL NIGHTLY
Qty: 90 CAPSULE | Refills: 1 | Status: SHIPPED
Start: 2022-02-08 | End: 2022-03-23 | Stop reason: DRUGHIGH

## 2022-03-14 DIAGNOSIS — N18.30 CONTROLLED TYPE 2 DIABETES MELLITUS WITH STAGE 3 CHRONIC KIDNEY DISEASE, WITHOUT LONG-TERM CURRENT USE OF INSULIN (HCC): ICD-10-CM

## 2022-03-14 DIAGNOSIS — E11.22 CONTROLLED TYPE 2 DIABETES MELLITUS WITH STAGE 3 CHRONIC KIDNEY DISEASE, WITHOUT LONG-TERM CURRENT USE OF INSULIN (HCC): ICD-10-CM

## 2022-03-14 NOTE — TELEPHONE ENCOUNTER
Last visit: 09/23/21  Last Med refill: 02/08/22  Does patient have enough medication for 72 hours: yes    Next Visit Date:  Future Appointments   Date Time Provider Jazmin Chan   3/23/2022  9:00 AM JORDAN Luevano - LARISSA [address]el [address] Maintenance   Topic Date Due    Depression Monitoring  Never done    HIV screen  Never done    COVID-19 Vaccine (2 - Booster for General Blood series) 05/05/2021    Shingles Vaccine (1 of 2) 09/23/2022 (Originally 6/25/2008)    A1C test (Diabetic or Prediabetic)  04/26/2022    Lipid screen  04/26/2022    Potassium monitoring  04/26/2022    Creatinine monitoring  04/26/2022    Diabetic retinal exam  05/26/2022    Breast cancer screen  07/16/2022    Diabetic foot exam  09/23/2022    Colorectal Cancer Screen  02/27/2023    Pneumococcal 0-64 years Vaccine (2 of 2 - PPSV23) 06/25/2023    DTaP/Tdap/Td vaccine (3 - Td or Tdap) 09/13/2028    Flu vaccine  Completed    Hepatitis C screen  Completed    Hepatitis A vaccine  Aged Out    Hib vaccine  Aged Out    Meningococcal (ACWY) vaccine  Aged Out       Hemoglobin A1C (%)   Date Value   04/26/2021 6.2 (H)   08/31/2020 7.3 (H)   11/04/2019 6.7 (H)             ( goal A1C is < 7)   Microalb/Crt.  Ratio (mcg/mg creat)   Date Value   04/23/2019 5     LDL Cholesterol (mg/dL)   Date Value   04/26/2021 106   08/31/2020 179 (H)       (goal LDL is <100)   AST (U/L)   Date Value   04/26/2021 17     ALT (U/L)   Date Value   04/26/2021 17     BUN (mg/dL)   Date Value   04/26/2021 14     BP Readings from Last 3 Encounters:   09/23/21 130/80   04/28/21 118/84   10/29/20 126/88          (goal 120/80)    All Future Testing planned in [facility]  Lab Frequency Next Occurrence   MRI HIP LEFT WO CONTRAST Once 11/24/2021   MRI LUMBAR SPINE WO CONTRAST Once 11/24/2021               Patient Active Problem List:     Diabetes mellitus 2 with renal manifestations, controlled     Lumbar disc herniation with radiculopathy Hyperlipidemia     Major depression     Peripheral neuropathy     ARNIE (obstructive sleep apnea)     TMJ arthralgia     GERD (gastroesophageal reflux disease)     Acute pain of left hip

## 2022-03-23 ENCOUNTER — OFFICE VISIT (OUTPATIENT)
Dept: FAMILY MEDICINE CLINIC | Age: 62
End: 2022-03-23
Payer: COMMERCIAL

## 2022-03-23 VITALS
SYSTOLIC BLOOD PRESSURE: 140 MMHG | BODY MASS INDEX: 30.15 KG/M2 | HEART RATE: 80 BPM | OXYGEN SATURATION: 98 % | RESPIRATION RATE: 20 BRPM | WEIGHT: 170.2 LBS | TEMPERATURE: 97.9 F | DIASTOLIC BLOOD PRESSURE: 80 MMHG

## 2022-03-23 DIAGNOSIS — R73.03 PREDIABETES: ICD-10-CM

## 2022-03-23 DIAGNOSIS — N18.30 CONTROLLED TYPE 2 DIABETES MELLITUS WITH STAGE 3 CHRONIC KIDNEY DISEASE, WITHOUT LONG-TERM CURRENT USE OF INSULIN (HCC): ICD-10-CM

## 2022-03-23 DIAGNOSIS — E11.22 CONTROLLED TYPE 2 DIABETES MELLITUS WITH STAGE 3 CHRONIC KIDNEY DISEASE, WITHOUT LONG-TERM CURRENT USE OF INSULIN (HCC): ICD-10-CM

## 2022-03-23 DIAGNOSIS — F33.1 MODERATE EPISODE OF RECURRENT MAJOR DEPRESSIVE DISORDER (HCC): ICD-10-CM

## 2022-03-23 DIAGNOSIS — E66.09 CLASS 1 OBESITY DUE TO EXCESS CALORIES WITHOUT SERIOUS COMORBIDITY WITH BODY MASS INDEX (BMI) OF 30.0 TO 30.9 IN ADULT: ICD-10-CM

## 2022-03-23 DIAGNOSIS — I10 ESSENTIAL HYPERTENSION: ICD-10-CM

## 2022-03-23 DIAGNOSIS — E78.00 PURE HYPERCHOLESTEROLEMIA: ICD-10-CM

## 2022-03-23 DIAGNOSIS — F51.04 PSYCHOPHYSIOLOGICAL INSOMNIA: ICD-10-CM

## 2022-03-23 DIAGNOSIS — Z28.21 COVID-19 VACCINATION REFUSED: ICD-10-CM

## 2022-03-23 DIAGNOSIS — F41.9 ANXIETY: ICD-10-CM

## 2022-03-23 DIAGNOSIS — M25.552 ACUTE PAIN OF LEFT HIP: Primary | ICD-10-CM

## 2022-03-23 PROCEDURE — 3044F HG A1C LEVEL LT 7.0%: CPT | Performed by: NURSE PRACTITIONER

## 2022-03-23 PROCEDURE — 99214 OFFICE O/P EST MOD 30 MIN: CPT | Performed by: NURSE PRACTITIONER

## 2022-03-23 RX ORDER — DULOXETIN HYDROCHLORIDE 60 MG/1
CAPSULE, DELAYED RELEASE ORAL
Qty: 180 CAPSULE | Refills: 1 | Status: SHIPPED | OUTPATIENT
Start: 2022-03-23

## 2022-03-23 ASSESSMENT — ENCOUNTER SYMPTOMS
DIARRHEA: 0
WHEEZING: 0
BACK PAIN: 1
NAUSEA: 0
ROS SKIN COMMENTS: FOLLOWS WITH DERMATOLOGY
SHORTNESS OF BREATH: 0
CONSTIPATION: 0
ABDOMINAL PAIN: 0
ABDOMINAL DISTENTION: 0
CHEST TIGHTNESS: 0
COUGH: 0

## 2022-03-23 ASSESSMENT — PATIENT HEALTH QUESTIONNAIRE - PHQ9
SUM OF ALL RESPONSES TO PHQ QUESTIONS 1-9: 13
7. TROUBLE CONCENTRATING ON THINGS, SUCH AS READING THE NEWSPAPER OR WATCHING TELEVISION: 1
3. TROUBLE FALLING OR STAYING ASLEEP: 3
10. IF YOU CHECKED OFF ANY PROBLEMS, HOW DIFFICULT HAVE THESE PROBLEMS MADE IT FOR YOU TO DO YOUR WORK, TAKE CARE OF THINGS AT HOME, OR GET ALONG WITH OTHER PEOPLE: 3
8. MOVING OR SPEAKING SO SLOWLY THAT OTHER PEOPLE COULD HAVE NOTICED. OR THE OPPOSITE, BEING SO FIGETY OR RESTLESS THAT YOU HAVE BEEN MOVING AROUND A LOT MORE THAN USUAL: 0
5. POOR APPETITE OR OVEREATING: 0
1. LITTLE INTEREST OR PLEASURE IN DOING THINGS: 3
SUM OF ALL RESPONSES TO PHQ QUESTIONS 1-9: 13
SUM OF ALL RESPONSES TO PHQ QUESTIONS 1-9: 13
SUM OF ALL RESPONSES TO PHQ9 QUESTIONS 1 & 2: 6
2. FEELING DOWN, DEPRESSED OR HOPELESS: 3
9. THOUGHTS THAT YOU WOULD BE BETTER OFF DEAD, OR OF HURTING YOURSELF: 0
6. FEELING BAD ABOUT YOURSELF - OR THAT YOU ARE A FAILURE OR HAVE LET YOURSELF OR YOUR FAMILY DOWN: 0
SUM OF ALL RESPONSES TO PHQ QUESTIONS 1-9: 13
4. FEELING TIRED OR HAVING LITTLE ENERGY: 3

## 2022-03-23 NOTE — PROGRESS NOTES
[address]  [phone]    3/23/22     Patient ID  Luz Maria Prieto is a 59 y.o. female  Established patient    Chief Complaint  Luz Maria Prieto presents today for Diabetes (Not checking BS at home ) and Hyperlipidemia      ASSESSMENT/PLAN  1. Acute pain of left hip  -     External Referral To Orthopedic Surgery  2. Controlled type 2 diabetes mellitus with stage 3 chronic kidney disease, without long-term current use of insulin ([address]  Assessment & Plan:   At goal, continue current medications, continue current treatment plan, medication adherence emphasized and lifestyle modifications recommended  Orders:  -     CBC; Future  3. Prediabetes  -     Comprehensive Metabolic Panel, Fasting; Future  -     Urinalysis with Reflex to Culture; Future  -     Hemoglobin A1C; Future  4. Essential hypertension  5. Pure hypercholesterolemia  -     Lipid Panel; Future  6. Moderate episode of recurrent major depressive disorder (HCC)  Assessment & Plan:   Uncontrolled, continue current medications, continue current treatment plan, medication adherence emphasized and lifestyle modifications recommended  Orders:  -     DULoxetine (CYMBALTA) 60 MG extended release capsule; TAKE 1 CAPSULE twice a day, Disp-180 capsule, R-1Normal  7. Anxiety  -     TSH with Reflex; Future  8. Psychophysiological insomnia  9. Class 1 obesity due to excess calories without serious comorbidity with body mass index (BMI) of 30.0 to 30.9 in adult  -     CBC; Future  -     Vitamin B12 & Folate; Future  -     Vitamin D 25 Hydroxy; Future  10. COVID-19 vaccination refused     Increase Cymbalta 60 BID  Ortho? Hip pain? Imaging? Return in about 6 months (around 9/23/2022).     Patient Care Team:  JORDAN Polanco CNP as PCP - General (Nurse Practitioner Family)  JORDAN Polanco CNP as PCP - [facility] Empaneled Provider  Ann Dai MD as Physician (Dermatology)  [facility] as Physician (Gynecology)  Taran Pena, PhD (Psychology)    SUBJECTIVE/OBJECTIVE  History of Present illness / Visit Summary   Patient presents for follow up   Reviewed health maintenance and any recent labs/imaging   Previous orders for MRI for left hip and knee pain not covered by insurance  Rashawn Freedman goes to a chiropractor and massage therapist once a month to help with pain  Ortho referral placed to previous physician  She had injections in her back in the past and did have pain improvement  Feeling overwhelmed at home, especially since her  is almost fully retired  Her mood hasn't improved and she is not sleeping  She has an upcoming apt with Denisha Membreno on 4/11/22      Hip Pain   The incident occurred more than 1 week ago (chronic). There was no injury mechanism. The pain is present in the left hip and left knee. The quality of the pain is described as aching. The pain has been constant since onset. Associated symptoms include a loss of motion and numbness (left lower extremity ). Pertinent negatives include no inability to bear weight. She reports no foreign bodies present. The symptoms are aggravated by movement, palpation and weight bearing. She has tried ice, NSAIDs, rest and non-weight bearing for the symptoms. The treatment provided mild relief. Depression  Patient complains of depression. Complains of depressed mood, fatigue, impaired memory and insomnia. Denies current suicidal and homicidal plan or intent. Current treatment includes Cymbalta and individual therapy. Review of Systems  Review of Systems   Constitutional: Positive for fatigue. Negative for activity change, appetite change, chills and fever. Respiratory: Negative for cough, chest tightness, shortness of breath and wheezing. ARNIE, no CPAP, wears mouth guard    Cardiovascular: Negative for chest pain, palpitations and leg swelling.    Gastrointestinal: Negative for abdominal distention, abdominal pain, constipation, diarrhea and nausea. Hx Diverticulosis   Genitourinary: Negative for difficulty urinating, hematuria and urgency. Musculoskeletal: Positive for arthralgias (left hip, left knee) and back pain (chronic ). Negative for gait problem and joint swelling. Chiropractor/massage once a month   Skin: Negative for rash and wound. Follows with dermatology   Allergic/Immunologic: Positive for environmental allergies. Negative for food allergies. Neurological: Positive for numbness (left lower extremity ). Negative for dizziness, syncope, light-headedness and headaches. Psychiatric/Behavioral: Positive for agitation and sleep disturbance. Negative for self-injury and suicidal ideas. The patient is nervous/anxious (worsening). Physical exam   Physical Exam  Vitals and nursing note reviewed. Constitutional:       General: She is not in acute distress. Appearance: Normal appearance. She is well-developed. She is not ill-appearing or toxic-appearing. Cardiovascular:      Rate and Rhythm: Normal rate and regular rhythm. Heart sounds: Normal heart sounds, S1 normal and S2 normal.   Pulmonary:      Effort: Pulmonary effort is normal.      Breath sounds: Normal breath sounds and air entry. Musculoskeletal:      Left hip: Tenderness present. Decreased range of motion. Left knee: Decreased range of motion. Tenderness present. Skin:     General: Skin is warm and dry. Coloration: Skin is not pale. Neurological:      Mental Status: She is alert and oriented to person, place, and time. Motor: Motor function is intact. Gait: Gait is intact. Psychiatric:         Attention and Perception: Attention and perception normal.         Mood and Affect: Mood is depressed. Mood is not anxious. Affect is flat. Affect is not tearful. Speech: Speech normal.         Behavior: Behavior is withdrawn. Behavior is cooperative. Thought Content:  Thought content normal. Thought content does not include suicidal ideation. Thought content does not include suicidal plan. Cognition and Memory: Cognition and memory normal.         Judgment: Judgment normal.           Electronically signed by JORDAN Hamilton CNP, APRN-CNP on 4/9/2022 at 5:52 PM    Please note that this chart was generated using voice recognition Dragon dictation software. Although every effort was made to ensure the accuracy of this automated transcription, some errors in transcription may have occurred.     Chart review, assessment findings, and documentation completed with assistance of  of Nurse Practitioner program.

## 2022-03-24 ENCOUNTER — HOSPITAL ENCOUNTER (OUTPATIENT)
Age: 62
Setting detail: SPECIMEN
Discharge: HOME | End: 2022-03-24

## 2022-03-24 DIAGNOSIS — R73.03 PREDIABETES: ICD-10-CM

## 2022-03-24 DIAGNOSIS — F41.9 ANXIETY: ICD-10-CM

## 2022-03-24 DIAGNOSIS — E11.22 CONTROLLED TYPE 2 DIABETES MELLITUS WITH STAGE 3 CHRONIC KIDNEY DISEASE, WITHOUT LONG-TERM CURRENT USE OF INSULIN (HCC): ICD-10-CM

## 2022-03-24 DIAGNOSIS — E66.09 CLASS 1 OBESITY DUE TO EXCESS CALORIES WITHOUT SERIOUS COMORBIDITY WITH BODY MASS INDEX (BMI) OF 30.0 TO 30.9 IN ADULT: ICD-10-CM

## 2022-03-24 DIAGNOSIS — E78.00 PURE HYPERCHOLESTEROLEMIA: ICD-10-CM

## 2022-03-24 DIAGNOSIS — N18.30 CONTROLLED TYPE 2 DIABETES MELLITUS WITH STAGE 3 CHRONIC KIDNEY DISEASE, WITHOUT LONG-TERM CURRENT USE OF INSULIN (HCC): ICD-10-CM

## 2022-03-24 LAB
-: NORMAL
ALBUMIN SERPL-MCNC: 4.4 G/DL (ref 3.5–5.2)
ALBUMIN/GLOBULIN RATIO: 1.8 (ref 1–2.5)
ALP BLD-CCNC: 103 U/L (ref 35–104)
ALT SERPL-CCNC: 22 U/L (ref 5–33)
ANION GAP SERPL CALCULATED.3IONS-SCNC: 11 MMOL/L (ref 9–17)
AST SERPL-CCNC: 19 U/L
BILIRUB SERPL-MCNC: 0.46 MG/DL (ref 0.3–1.2)
BILIRUBIN URINE: NEGATIVE
BUN BLDV-MCNC: 10 MG/DL (ref 8–23)
CALCIUM SERPL-MCNC: 9.8 MG/DL (ref 8.6–10.4)
CASTS UA: NORMAL /LPF (ref 0–8)
CHLORIDE BLD-SCNC: 107 MMOL/L (ref 98–107)
CHOLESTEROL/HDL RATIO: 3.7
CHOLESTEROL: 166 MG/DL
CO2: 26 MMOL/L (ref 20–31)
COLOR: YELLOW
CREAT SERPL-MCNC: 0.72 MG/DL (ref 0.5–0.9)
EPITHELIAL CELLS UA: NORMAL /HPF (ref 0–5)
ESTIMATED AVERAGE GLUCOSE: 131 MG/DL
FOLATE: 11.2 NG/ML
GFR AFRICAN AMERICAN: >60 ML/MIN
GFR NON-AFRICAN AMERICAN: >60 ML/MIN
GFR SERPL CREATININE-BSD FRML MDRD: ABNORMAL ML/MIN/{1.73_M2}
GLUCOSE FASTING: 119 MG/DL (ref 70–99)
GLUCOSE URINE: NEGATIVE
HBA1C MFR BLD: 6.2 % (ref 4–6)
HCT VFR BLD CALC: 40.8 % (ref 36.3–47.1)
HDLC SERPL-MCNC: 45 MG/DL
HEMOGLOBIN: 13.4 G/DL (ref 11.9–15.1)
KETONES, URINE: ABNORMAL
LDL CHOLESTEROL: 99 MG/DL (ref 0–130)
LEUKOCYTE ESTERASE, URINE: ABNORMAL
MCH RBC QN AUTO: 29.6 PG (ref 25.2–33.5)
MCHC RBC AUTO-ENTMCNC: 32.8 G/DL (ref 28.4–34.8)
MCV RBC AUTO: 90.3 FL (ref 82.6–102.9)
NITRITE, URINE: NEGATIVE
NRBC AUTOMATED: 0 PER 100 WBC
PDW BLD-RTO: 12.8 % (ref 11.8–14.4)
PH UA: 6.5 (ref 5–8)
PLATELET # BLD: 300 K/UL (ref 138–453)
PMV BLD AUTO: 10.7 FL (ref 8.1–13.5)
POTASSIUM SERPL-SCNC: 4.2 MMOL/L (ref 3.7–5.3)
PROTEIN UA: NEGATIVE
RBC # BLD: 4.52 M/UL (ref 3.95–5.11)
RBC UA: NORMAL /HPF (ref 0–4)
SODIUM BLD-SCNC: 144 MMOL/L (ref 135–144)
SPECIFIC GRAVITY UA: 1.02 (ref 1–1.03)
TOTAL PROTEIN: 6.9 G/DL (ref 6.4–8.3)
TRIGL SERPL-MCNC: 112 MG/DL
TSH SERPL DL<=0.05 MIU/L-ACNC: 1.64 MIU/L (ref 0.3–5)
TURBIDITY: CLEAR
URINE HGB: NEGATIVE
UROBILINOGEN, URINE: NORMAL
VITAMIN B-12: 465 PG/ML (ref 232–1245)
VITAMIN D 25-HYDROXY: 43.1 NG/ML
WBC # BLD: 7.3 K/UL (ref 3.5–11.3)
WBC UA: NORMAL /HPF (ref 0–5)

## 2022-04-11 ENCOUNTER — OFFICE VISIT (OUTPATIENT)
Dept: BEHAVIORAL/MENTAL HEALTH CLINIC | Age: 62
End: 2022-04-11
Payer: COMMERCIAL

## 2022-04-11 DIAGNOSIS — F34.1 PERSISTENT DEPRESSIVE DISORDER WITH ANXIOUS DISTRESS, CURRENTLY SEVERE: Primary | ICD-10-CM

## 2022-04-11 PROCEDURE — 90791 PSYCH DIAGNOSTIC EVALUATION: CPT | Performed by: SOCIAL WORKER
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
## 2022-04-26 ENCOUNTER — OFFICE VISIT (OUTPATIENT)
Dept: BEHAVIORAL/MENTAL HEALTH CLINIC | Age: 62
End: 2022-04-26
Payer: COMMERCIAL

## 2022-04-26 DIAGNOSIS — F34.1 PERSISTENT DEPRESSIVE DISORDER WITH ANXIOUS DISTRESS, CURRENTLY SEVERE: Primary | ICD-10-CM

## 2022-04-26 PROCEDURE — 90834 PSYTX W PT 45 MINUTES: CPT | Performed by: SOCIAL WORKER

## 2022-04-26 NOTE — PROGRESS NOTES
ADULT BEHAVIORAL HEALTH FOLLOW UP  TIFFANIE Smart  Licensed Independent        Visit Date: 4/26/2022   Time of appointment:  [phone]   Time spent with Patient: 45 minutes. This is patient's second appointment. Reason for Consult:  Depression and Anxiety     Referring Provider/PCP:    No ref. provider found  JORDAN Christopher - CNP      Pt provided informed consent for the behavioral health program. Discussed with patient model of service to include the limits of confidentiality (i.e. abuse reporting, suicide intervention, etc.) and short-term intervention focused approach. Pt indicated understanding. Taran Savage is a 59 y.o. female who presents for follow up of depression. She reports a lot of anxiety and depressed mood this week, focusing on the past while also feeling hopeless when she tries to focus on the future. We processed reasoning behind difficult moods as well as barriers to improvement, finding desire to for time alone and desire to people please conflicting. We reviewed ways to slowly address this, by prioritizing herself and reminding herself she is allowed to do so. She was encouraged to let go of trying to make everyone happy, with reminder this hasn't worked so far. Previous Recommendations: Guillermina Clemens is agreeable to engage in therapy to work on coping with life stress. She will follow up with Keely Mcfarland in 2 weeks. MENTAL STATUS EXAM  Mood was depressed with sad  and tearful affect. Suicidal ideation was denied. Homicidal ideation was denied. Hygiene was good . Dress was appropriate. Behavior was Within Normal Limits with No observation or self-report of difficulties ambulating. Attitude was Cooperative. Eye-contact was good. Speech: rate - WNL, rhythm - WNL, volume - WNL. Verbalizations were coherent.   Thought processes were intact and goal-oriented without evidence of delusions, hallucinations, obsessions, or kimmie; with moderate cognitive distortions. Associations were characterized by intact cognitive processes. Pt was oriented to person, place, time, and general circumstances;  recent:  good and remote:  good. Insight and judgment were estimated to be fair, AEB, a fair  understanding of cyclical maladaptive patterns, and the ability to use insight to inform behavior change. ASSESSMENT  Denise Pennington presented to the appointment today for evaluation and treatment of symptoms of depression and anxiety. She is currently deemed no risk to herself or others and meets criteria for PDD. She was in agreement with recommendations. PHQ Scores 3/23/2022 4/23/2018 6/6/2016   PHQ2 Score 6 6 4   PHQ9 Score 13 12 16     Interpretation of Total Score Depression Severity: 1-4 = Minimal depression, 5-9 = Mild depression, 10-14 = Moderate depression, 15-19 = Moderately severe depression, 20-27 = Severe depression    How often pt has had thoughts of death or hurting self (if PHQ positive for depression):       No flowsheet data found. Interpretation of JHONY-7 score: 5-9 = mild anxiety, 10-14 = moderate anxiety, 15+ = severe anxiety. Recommend referral to behavioral health for scores 10 or greater. DIAGNOSIS  Nubia was seen today for depression and anxiety. Diagnoses and all orders for this visit:    Persistent depressive disorder with anxious distress, currently severe          INTERVENTION  Trained in strategies for increasing balanced thinking, Discussed self-care (sleep, nutrition, rewarding activities, social support, exercise), Discussed potential barriers to change, Established rapport, Supportive techniques and Provided Psychoeducation re: guilt opposite action      Mar Peters will prioritize her own needs, with reminder there is no need to feel guilty for this. She will follow up with Deyanira Brantley in 3 weeks. INTERACTIVE COMPLEXITY  Is interactive complexity present?   No  Reason:  N/A  Additional Supporting Information:  N/A Electronically signed by TIFFANIE Castro on 4/26/22 at 9:32 AM EDT

## 2022-05-17 ENCOUNTER — OFFICE VISIT (OUTPATIENT)
Dept: BEHAVIORAL/MENTAL HEALTH CLINIC | Age: 62
End: 2022-05-17
Payer: COMMERCIAL

## 2022-05-17 DIAGNOSIS — F34.1 PERSISTENT DEPRESSIVE DISORDER WITH ANXIOUS DISTRESS, CURRENTLY SEVERE: Primary | ICD-10-CM

## 2022-05-17 PROCEDURE — 90832 PSYTX W PT 30 MINUTES: CPT | Performed by: SOCIAL WORKER

## 2022-05-17 NOTE — PROGRESS NOTES
ADULT BEHAVIORAL HEALTH FOLLOW UP  TIFFANIE Freeman  Licensed Independent        Visit Date: 5/17/2022   Time of appointment:  [phone]   Time spent with Patient: 30 minutes. This is patient's third appointment. Reason for Consult:  Depression and Anxiety     Referring Provider/PCP:    No ref. provider found  JORDAN Solis CNP      Pt provided informed consent for the behavioral health program. Discussed with patient model of service to include the limits of confidentiality (i.e. abuse reporting, suicide intervention, etc.) and short-term intervention focused approach. Pt indicated understanding. Cecile Irvin is a 59 y.o. female who presents for follow up of depression and anxiety. She states she has been using permission slip to say no more often and prioritize her own needs, reports this feels very good. She states she feels better, reviewed different changes she is making to continue this change. We processed how the body will say no for us when we do not. She reviewed possible things to address in future sessions, lasting pains and resentments, not ready to yet but open to, will continue to focus on herself. Previous Recommendations: Carie Chong will prioritize her own needs, with reminder there is no need to feel guilty for this. She will follow up with France Aguillon in 3 weeks. MENTAL STATUS EXAM  Mood was within normal limits with calm affect. Suicidal ideation was denied. Homicidal ideation was denied. Hygiene was good . Dress was appropriate. Behavior was Within Normal Limits with No observation or self-report of difficulties ambulating. Attitude was Cooperative. Eye-contact was good. Speech: rate - WNL, rhythm - WNL, volume - WNL. Verbalizations were coherent. Thought processes were intact and goal-oriented without evidence of delusions, hallucinations, obsessions, or kimmie; with moderate cognitive distortions.    Associations were characterized by intact cognitive processes. Pt was oriented to person, place, time, and general circumstances;  recent:  good and remote:  good. Insight and judgment were estimated to be fair, AEB, a fair  understanding of cyclical maladaptive patterns, and the ability to use insight to inform behavior change. ASSESSMENT  Tanvi Humphrey presented to the appointment today for evaluation and treatment of symptoms of depression and anxiety. She is currently deemed no risk to herself or others and meets criteria for PDD. She was in agreement with recommendations. PHQ Scores 3/23/2022 4/23/2018 6/6/2016   PHQ2 Score 6 6 4   PHQ9 Score 13 12 16     Interpretation of Total Score Depression Severity: 1-4 = Minimal depression, 5-9 = Mild depression, 10-14 = Moderate depression, 15-19 = Moderately severe depression, 20-27 = Severe depression    How often pt has had thoughts of death or hurting self (if PHQ positive for depression):       No flowsheet data found. Interpretation of JHONY-7 score: 5-9 = mild anxiety, 10-14 = moderate anxiety, 15+ = severe anxiety. Recommend referral to behavioral health for scores 10 or greater. DIAGNOSIS  Nubia was seen today for depression and anxiety. Diagnoses and all orders for this visit:    Persistent depressive disorder with anxious distress, currently severe          INTERVENTION  Trained in strategies for increasing balanced thinking, Discussed self-care (sleep, nutrition, rewarding activities, social support, exercise), Discussed potential barriers to change, Established rapport and Supportive techniques      Gwendolyn Lopez will continue to say no and prioritize herself. She will follow up with Angela Wren in 3 weeks. INTERACTIVE COMPLEXITY  Is interactive complexity present?   No  Reason:  N/A  Additional Supporting Information:  N/A       Electronically signed by TIFFANIE Lion on 5/17/22 at 9:31 AM EDT

## 2022-08-30 DIAGNOSIS — I10 ESSENTIAL HYPERTENSION: ICD-10-CM

## 2022-08-31 NOTE — TELEPHONE ENCOUNTER
LOV: 3/23/22  LRF: 2/8/22  NA: 9/20/22  Health Maintenance   Topic Date Due    HIV screen  Never done    Pneumococcal 0-64 years Vaccine (2 - PCV) 08/24/2010    Diabetic microalbuminuria test  04/23/2020    Diabetic retinal exam  05/26/2022    Breast cancer screen  07/16/2022    Shingles vaccine (1 of 2) 09/23/2022 (Originally 6/25/2008)    COVID-19 Vaccine (2 - Booster for Juarez series) 03/14/2023 (Originally 5/5/2021)    Flu vaccine (1) 09/01/2022    Diabetic foot exam  09/23/2022    Colorectal Cancer Screen  02/27/2023    Depression Monitoring  03/23/2023    A1C test (Diabetic or Prediabetic)  03/24/2023    Lipids  03/24/2023    DTaP/Tdap/Td vaccine (3 - Td or Tdap) 09/13/2028    Hepatitis C screen  Completed    Hepatitis A vaccine  Aged Out    Hib vaccine  Aged Out    Meningococcal (ACWY) vaccine  Aged Out             (applicable per patient's age: Cancer Screenings, Depression Screening, Fall Risk Screening, Immunizations)    Hemoglobin A1C (%)   Date Value   03/24/2022 6.2 (H)   04/26/2021 6.2 (H)   08/31/2020 7.3 (H)     Microalb/Crt.  Ratio (mcg/mg creat)   Date Value   04/23/2019 5     LDL Cholesterol (mg/dL)   Date Value   03/24/2022 99     AST (U/L)   Date Value   03/24/2022 19     ALT (U/L)   Date Value   03/24/2022 22     BUN (mg/dL)   Date Value   03/24/2022 10      (goal A1C is < 7)   (goal LDL is <100) need 30-50% reduction from baseline     BP Readings from Last 3 Encounters:   03/23/22 (!) 140/80   09/23/21 130/80   04/28/21 118/84    (goal /80)      All Future Testing planned in [facility]:  Lab Frequency Next Occurrence   MRI HIP LEFT WO CONTRAST Once 11/24/2021   MRI LUMBAR SPINE WO CONTRAST Once 11/24/2021       Next Visit Date:  Future Appointments   Date Time Provider Jazmin Chan   9/20/2022  9:30 AM Pham Aguilar APRN - CNP [facility]            Patient Active Problem List:     Diabetes mellitus 2 with renal manifestations, controlled     Lumbar disc herniation with radiculopathy     Hyperlipidemia     Persistent depressive disorder with anxious distress, currently severe     Peripheral neuropathy     ARNIE (obstructive sleep apnea)     TMJ arthralgia     GERD (gastroesophageal reflux disease)     Acute pain of left hip

## 2022-08-31 NOTE — TELEPHONE ENCOUNTER
Patient called stating she will leave on vacation today and asked if her prescription could be sent in to the pharmacy today.

## 2022-09-01 RX ORDER — LISINOPRIL 40 MG/1
TABLET ORAL
Qty: 90 TABLET | Refills: 1 | Status: SHIPPED | OUTPATIENT
Start: 2022-09-01

## 2022-11-03 DIAGNOSIS — I10 ESSENTIAL HYPERTENSION: ICD-10-CM

## 2022-11-03 DIAGNOSIS — K21.9 GASTROESOPHAGEAL REFLUX DISEASE: ICD-10-CM

## 2022-11-03 DIAGNOSIS — F33.1 MODERATE EPISODE OF RECURRENT MAJOR DEPRESSIVE DISORDER (HCC): ICD-10-CM

## 2022-11-03 DIAGNOSIS — E78.00 PURE HYPERCHOLESTEROLEMIA: ICD-10-CM

## 2022-11-03 DIAGNOSIS — N18.30 CONTROLLED TYPE 2 DIABETES MELLITUS WITH STAGE 3 CHRONIC KIDNEY DISEASE, WITHOUT LONG-TERM CURRENT USE OF INSULIN (HCC): ICD-10-CM

## 2022-11-03 DIAGNOSIS — E11.22 CONTROLLED TYPE 2 DIABETES MELLITUS WITH STAGE 3 CHRONIC KIDNEY DISEASE, WITHOUT LONG-TERM CURRENT USE OF INSULIN (HCC): ICD-10-CM

## 2022-11-04 NOTE — TELEPHONE ENCOUNTER
Last visit: 3/23/22  Last Med refill: 9/1/22 lisinopril,3/23/22 cymbalta,3/15/22metformin,2/8/22prilosec,lipitor  Does patient have enough medication for 72 hours: No: pt requesting Rx's to be 30 days due to no insurance    Next Visit Date:  Future Appointments   Date Time Provider Jazmin Chan   12/2/2022 12:30 PM Pham Batista Asp, [place] [address] Maintenance   Topic Date Due    HIV screen  Never done    Shingles vaccine (1 of 2) Never done    Diabetic retinal exam  05/26/2022    Breast cancer screen  07/16/2022    Flu vaccine (1) 08/01/2022    Diabetic foot exam  09/23/2022    COVID-19 Vaccine (2 - Booster for Juarez series) 03/14/2023 (Originally 5/5/2021)    Colorectal Cancer Screen  02/27/2023    Depression Monitoring  03/23/2023    A1C test (Diabetic or Prediabetic)  03/24/2023    Lipids  03/24/2023    DTaP/Tdap/Td vaccine (3 - Td or Tdap) 09/13/2028    Pneumococcal 0-64 years Vaccine  Completed    Hepatitis C screen  Completed    Hepatitis A vaccine  Aged Out    Hib vaccine  Aged Out    Meningococcal (ACWY) vaccine  Aged Out       Hemoglobin A1C (%)   Date Value   03/24/2022 6.2 (H)   04/26/2021 6.2 (H)   08/31/2020 7.3 (H)             ( goal A1C is < 7)   Microalb/Crt.  Ratio (mcg/mg creat)   Date Value   04/23/2019 5     LDL Cholesterol (mg/dL)   Date Value   03/24/2022 99   04/26/2021 106       (goal LDL is <100)   AST (U/L)   Date Value   03/24/2022 19     ALT (U/L)   Date Value   03/24/2022 22     BUN (mg/dL)   Date Value   03/24/2022 10     BP Readings from Last 3 Encounters:   03/23/22 (!) 140/80   09/23/21 130/80   04/28/21 118/84          (goal 120/80)    All Future Testing planned in [facility]  Lab Frequency Next Occurrence               Patient Active Problem List:     Diabetes mellitus 2 with renal manifestations, controlled     Lumbar disc herniation with radiculopathy     Hyperlipidemia     Persistent depressive disorder with anxious distress, currently severe Peripheral neuropathy     ARNIE (obstructive sleep apnea)     TMJ arthralgia     GERD (gastroesophageal reflux disease)     Acute pain of left hip

## 2022-11-05 RX ORDER — OMEPRAZOLE 20 MG/1
20 CAPSULE, DELAYED RELEASE ORAL DAILY
Qty: 30 CAPSULE | Refills: 5 | Status: SHIPPED | OUTPATIENT
Start: 2022-11-05 | End: 2022-12-05

## 2022-11-05 RX ORDER — ATORVASTATIN CALCIUM 80 MG/1
80 TABLET, FILM COATED ORAL DAILY
Qty: 30 TABLET | Refills: 5 | Status: SHIPPED | OUTPATIENT
Start: 2022-11-05 | End: 2022-12-05

## 2022-11-05 RX ORDER — LISINOPRIL 40 MG/1
40 TABLET ORAL DAILY
Qty: 30 TABLET | Refills: 5 | Status: SHIPPED | OUTPATIENT
Start: 2022-11-05 | End: 2022-12-05

## 2022-11-05 RX ORDER — DULOXETIN HYDROCHLORIDE 60 MG/1
CAPSULE, DELAYED RELEASE ORAL
Qty: 60 CAPSULE | Refills: 5 | Status: SHIPPED | OUTPATIENT
Start: 2022-11-05

## 2023-01-19 ENCOUNTER — OFFICE VISIT (OUTPATIENT)
Dept: PRIMARY CARE CLINIC | Age: 63
End: 2023-01-19
Payer: COMMERCIAL

## 2023-01-19 ENCOUNTER — HOSPITAL ENCOUNTER (OUTPATIENT)
Dept: GENERAL RADIOLOGY | Facility: CLINIC | Age: 63
Discharge: HOME | End: 2023-01-21
Payer: COMMERCIAL

## 2023-01-19 ENCOUNTER — TELEPHONE (OUTPATIENT)
Dept: FAMILY MEDICINE CLINIC | Age: 63
End: 2023-01-19

## 2023-01-19 ENCOUNTER — HOSPITAL ENCOUNTER (OUTPATIENT)
Facility: CLINIC | Age: 63
Discharge: HOME | End: 2023-01-21
Payer: COMMERCIAL

## 2023-01-19 VITALS
DIASTOLIC BLOOD PRESSURE: 82 MMHG | WEIGHT: 170 LBS | HEART RATE: 73 BPM | TEMPERATURE: 97.9 F | OXYGEN SATURATION: 98 % | BODY MASS INDEX: 30.11 KG/M2 | SYSTOLIC BLOOD PRESSURE: 136 MMHG

## 2023-01-19 DIAGNOSIS — M79.632 LEFT FOREARM PAIN: ICD-10-CM

## 2023-01-19 DIAGNOSIS — M25.522 LEFT ELBOW PAIN: Primary | ICD-10-CM

## 2023-01-19 DIAGNOSIS — M25.522 LEFT ELBOW PAIN: ICD-10-CM

## 2023-01-19 DIAGNOSIS — M25.532 LEFT WRIST PAIN: ICD-10-CM

## 2023-01-19 DIAGNOSIS — M25.811 MASS OF JOINT OF RIGHT SHOULDER: ICD-10-CM

## 2023-01-19 DIAGNOSIS — M25.511 ACUTE PAIN OF RIGHT SHOULDER: ICD-10-CM

## 2023-01-19 DIAGNOSIS — R93.89 ABNORMAL X-RAY: ICD-10-CM

## 2023-01-19 PROCEDURE — G8427 DOCREV CUR MEDS BY ELIG CLIN: HCPCS | Performed by: PHYSICIAN ASSISTANT

## 2023-01-19 PROCEDURE — 73080 X-RAY EXAM OF ELBOW: CPT

## 2023-01-19 PROCEDURE — 1036F TOBACCO NON-USER: CPT | Performed by: PHYSICIAN ASSISTANT

## 2023-01-19 PROCEDURE — 99213 OFFICE O/P EST LOW 20 MIN: CPT | Performed by: PHYSICIAN ASSISTANT

## 2023-01-19 PROCEDURE — 73110 X-RAY EXAM OF WRIST: CPT

## 2023-01-19 PROCEDURE — 73090 X-RAY EXAM OF FOREARM: CPT

## 2023-01-19 PROCEDURE — G8484 FLU IMMUNIZE NO ADMIN: HCPCS | Performed by: PHYSICIAN ASSISTANT

## 2023-01-19 PROCEDURE — G8417 CALC BMI ABV UP PARAM F/U: HCPCS | Performed by: PHYSICIAN ASSISTANT

## 2023-01-19 PROCEDURE — 3017F COLORECTAL CA SCREEN DOC REV: CPT | Performed by: PHYSICIAN ASSISTANT

## 2023-01-19 PROCEDURE — 73030 X-RAY EXAM OF SHOULDER: CPT

## 2023-01-19 ASSESSMENT — ENCOUNTER SYMPTOMS
GASTROINTESTINAL NEGATIVE: 1
RESPIRATORY NEGATIVE: 1

## 2023-01-19 NOTE — PROGRESS NOTES
[address] [facility]  Phone: [phone]  Fax: Santhosh Martin    Pt Name: Bunny Mondragon  MRN: [id number]  Janesgfleighann 1960  Date of evaluation: 1/19/2023  Provider: Sukhdev Castelan PA-C     CHIEF COMPLAINT       Chief Complaint   Patient presents with    Other     Growth on right shoulder that she noticed a couple weeks ago, hurts to move her shoulder. Left arm pain- from elbow down to wrist. She states that she had some hay bails come down on top of her arm around Thanksgiving, but it hasnt gotten any better. HISTORY OF PRESENT ILLNESS  (Location/Symptom, Timing/Onset, Context/Setting, Quality, Duration, Modifying Factors, Severity.)   Bunny Mondragon is a 57 y.o. White (non-) [1] female who presents to the office for evaluation of      Shoulder Pain   The pain is present in the right shoulder. This is a new (right shoulder \"Bump\") problem. The current episode started 1 to 4 weeks ago. The problem has been waxing and waning. The quality of the pain is described as aching. The pain is mild. Pertinent negatives include no fever, inability to bear weight, itching, joint locking, joint swelling, limited range of motion, numbness, stiffness or tingling. The symptoms are aggravated by activity. Wrist Pain   The pain is present in the left elbow and left wrist. This is a new problem. The current episode started more than 1 month ago. There has been a history of trauma (Patient had a bail fall on her left arm - ROM pain). The quality of the pain is described as aching. The pain is moderate. Pertinent negatives include no fever, inability to bear weight, itching, joint locking, joint swelling, limited range of motion, numbness, stiffness or tingling. The symptoms are aggravated by activity. Nursing Notes were reviewed.     REVIEW OF SYSTEMS    (2-9 systems for level 4, 10 or more for level 5)     Review of Systems Constitutional:  Negative for fever. HENT: Negative. Respiratory: Negative. Cardiovascular: Negative. Gastrointestinal: Negative. Musculoskeletal:  Negative for stiffness. Right shoulder pain - possible mass to right shoulder   Left wrist, left forearm and left elbow pain    Skin:  Negative for itching. Neurological:  Negative for tingling and numbness. Except as noted above the remainder of the review of systems was reviewed andnegative. PAST MEDICAL HISTORY   History reviewed. Past Medical History:   Diagnosis Date    Benign paroxysmal vertigo     CKD (chronic kidney disease) stage 3, GFR 30-59 ml/min ([facility] [address] 2015    Diabetes mellitus, type 2 ([facility] [address]     Diverticulosis of colon     DVT of lower extremity (deep venous thrombosis) ([facility] [address] 2011    after lumbar surgery    Esophageal reflux     Hyperlipidemia     Hypertension     Lumbar disc herniation with radiculopathy     Rotator cuff tear     right         SURGICAL HISTORY     History reviewed. Past Surgical History:   Procedure Laterality Date     SECTION  ,     GALLBLADDER SURGERY      LUMBAR DISCECTOMY           LUMBAR FUSION      ROTATOR CUFF REPAIR      Right    SPINAL FUSION      THOMPSON AND BSO (CERVIX REMOVED)           CURRENT MEDICATIONS       Current Outpatient Medications   Medication Sig Dispense Refill    DULoxetine (CYMBALTA) 60 MG extended release capsule TAKE 1 CAPSULE twice a day 60 capsule 5    metFORMIN (GLUCOPHAGE) 500 MG tablet TAKE 1 TABLET DAILY WITH SUPPER 30 tablet 5    Apoaequorin (PREVAGEN EXTRA STRENGTH) 20 MG CAPS Take by mouth daily To help with memory.       Ascorbic Acid (VITAMIN C) 1000 MG tablet Take 1,000 mg by mouth daily      Cholecalciferol (VITAMIN D3) 2000 units CAPS Take 1 capsule by mouth daily 30 capsule 0    DOCUSATE CALCIUM PO Take 1 capsule by mouth daily as needed      naproxen (NAPROSYN) 500 MG tablet Take 1 tablet by mouth daily as needed aspirin 81 MG EC tablet Take 81 mg by mouth daily. omeprazole (PRILOSEC) 20 MG delayed release capsule Take 1 capsule by mouth Daily 30 capsule 5    atorvastatin (LIPITOR) 80 MG tablet Take 1 tablet by mouth daily New dose as discussed 30 tablet 5    metFORMIN (GLUCOPHAGE) 1000 MG tablet Take 1 tablet by mouth daily (with breakfast) 30 tablet 5    lisinopril (PRINIVIL;ZESTRIL) 40 MG tablet Take 1 tablet by mouth daily 30 tablet 5     No current facility-administered medications for this visit. ALLERGIES     Adhesive tape, Demerol [meperidine hcl], and Penicillin g potassium    FAMILY HISTORY           Problem Relation Age of Onset    Other Mother         heart aneurysm    Heart Attack Father     Kidney Cancer Brother         passed away at 59    Stroke Maternal Grandmother     Heart Attack Maternal Grandfather     Asthma Maternal Grandfather     Heart Attack Paternal Grandfather      Family Status   Relation Name Status    Mother      Father      Sister  Alive    Brother      MGM      MGF      PGM      PGF            SOCIAL HISTORY      reports that she has never smoked. She has been exposed to tobacco smoke. She has never used smokeless tobacco. She reports current alcohol use of about 2.0 - 4.0 standard drinks per week. She reports that she does not use drugs. PHYSICAL EXAM    (up to 7 for level 4, 8 or more for level 5)     Vitals:    23 1221   BP: 136/82   Site: Left Upper Arm   Position: Sitting   Cuff Size: Large Adult   Pulse: 73   Temp: 97.9 °F (36.6 °C)   SpO2: 98%   Weight: 170 lb (77.1 kg)         Physical Exam  Vitals and nursing note reviewed. Constitutional:       General: She is not in acute distress. Appearance: Normal appearance. She is not ill-appearing. HENT:      Head: Normocephalic and atraumatic.       Right Ear: External ear normal.      Left Ear: External ear normal.      Nose: Nose normal. Mouth/Throat:      Mouth: Mucous membranes are moist.   Eyes:      Extraocular Movements: Extraocular movements intact. Conjunctiva/sclera: Conjunctivae normal.      Pupils: Pupils are equal, round, and reactive to light. Pulmonary:      Effort: Pulmonary effort is normal.   Abdominal:      Palpations: Abdomen is soft. Musculoskeletal:      Right shoulder: Tenderness and bony tenderness present. No swelling or crepitus. Left elbow: No swelling. Tenderness present. Left forearm: Tenderness and bony tenderness present. No swelling. Left wrist: Tenderness and bony tenderness present. No swelling or deformity. Skin:     General: Skin is warm and dry. Neurological:      Mental Status: She is alert and oriented to person, place, and time. DIFFERENTIAL DIAGNOSIS:       King Epley reviewed the disposition diagnosis with the patient and or their family/guardian. I have answered their questions and given discharge instructions. They voiced understanding of these instructions and did not have anyfurther questions or complaints.       PROCEDURES:  Orders Placed This Encounter   Procedures    XR ELBOW LEFT (MIN 3 VIEWS)     Standing Status:   Future     Number of Occurrences:   1     Standing Expiration Date:   1/19/2024     Order Specific Question:   Reason for exam:     Answer:   left wrist pain    XR WRIST LEFT (MIN 3 VIEWS)     Standing Status:   Future     Number of Occurrences:   1     Standing Expiration Date:   1/19/2024     Order Specific Question:   Reason for exam:     Answer:   left wrist pain    XR RADIUS ULNA LEFT (2 VIEWS)     Standing Status:   Future     Number of Occurrences:   1     Standing Expiration Date:   1/19/2024     Order Specific Question:   Reason for exam:     Answer:   left forearm pain    XR SHOULDER RIGHT (MIN 2 VIEWS)     Standing Status:   Future     Number of Occurrences:   1     Standing Expiration Date:   1/19/2024     Order Specific Question:   Reason for exam:     Answer:   right shoulder pain/ abnormal shoulder mass    [facility]     Referral Priority:   Urgent     Referral Type:   Eval and Treat     Referral Reason:   Specialty Services Required     Requested Specialty:   Orthopedic Surgery     Number of Visits Requested:   1       No results found for this visit on 01/19/23. FINALIMPRESSION      Visit Diagnoses and Associated Orders       Left elbow pain    -  Primary    XR ELBOW LEFT (MIN 3 VIEWS) [[id number] Custom]   - Future Order    XR WRIST LEFT (MIN 3 VIEWS) [[id number] Custom]   - Future Order    XR RADIUS ULNA LEFT (2 VIEWS) [[id number] Custom]   - Future Order    [facility] [[id number] Custom]           Left forearm pain        XR ELBOW LEFT (MIN 3 VIEWS) [[id number] Custom]   - Future Order    XR WRIST LEFT (MIN 3 VIEWS) [[id number] Custom]   - Future Order    XR RADIUS ULNA LEFT (2 VIEWS) [[id number] Custom]   - Future Order    [facility] [[id number] Custom]           Left wrist pain        XR WRIST LEFT (MIN 3 VIEWS) [[id number] Custom]   - Future Order    XR RADIUS ULNA LEFT (2 VIEWS) [[id number] Custom]   - Future Order    [facility] [[id number] Custom]           Acute pain of right shoulder        XR SHOULDER RIGHT (MIN 2 VIEWS) [[id number] Custom]   - Future Order    [facility] [[id number] Custom]           Mass of joint of right shoulder        XR SHOULDER RIGHT (MIN 2 VIEWS) [[id number] Custom]   - Future Order    [facility] [[id number] Custom]           Abnormal x-ray        [facility] [[id number] Custom]                     PLAN     Return if symptoms worsen or fail to improve. DISCHARGEMEDICATIONS:  No orders of the defined types were placed in this encounter.         Plan:  X-rays as ordered - possible treatment alteration based on results RICE therapy   Tylenol/Motrin for pain as needed   Patient instructed to return to the office if symptoms worsen, return, or have any other concerns. Patient understands and is agreeable.          Margy Aase, PA-C 1/19/2023 5:23 PM

## 2023-01-19 NOTE — TELEPHONE ENCOUNTER
Patient called requesting a shoulder x-ray. Writer advised patient she could submit an e-visit or be evaluated in our walk-in.  Patient stated she will come into our walk-in

## 2023-01-25 ENCOUNTER — OFFICE VISIT (OUTPATIENT)
Dept: ORTHOPEDIC SURGERY | Age: 63
End: 2023-01-25
Payer: COMMERCIAL

## 2023-01-25 VITALS — RESPIRATION RATE: 16 BRPM | WEIGHT: 170 LBS | BODY MASS INDEX: 30.12 KG/M2 | HEIGHT: 63 IN

## 2023-01-25 DIAGNOSIS — M18.12 OSTEOARTHRITIS OF CARPOMETACARPAL (CMC) JOINT OF LEFT THUMB, UNSPECIFIED OSTEOARTHRITIS TYPE: Primary | ICD-10-CM

## 2023-01-25 DIAGNOSIS — M18.12 LOCALIZED PRIMARY OSTEOARTHRITIS OF CARPOMETACARPAL JOINT OF LEFT THUMB: Primary | ICD-10-CM

## 2023-01-25 PROCEDURE — G8484 FLU IMMUNIZE NO ADMIN: HCPCS | Performed by: ORTHOPAEDIC SURGERY

## 2023-01-25 PROCEDURE — G8427 DOCREV CUR MEDS BY ELIG CLIN: HCPCS | Performed by: ORTHOPAEDIC SURGERY

## 2023-01-25 PROCEDURE — 1036F TOBACCO NON-USER: CPT | Performed by: ORTHOPAEDIC SURGERY

## 2023-01-25 PROCEDURE — 99204 OFFICE O/P NEW MOD 45 MIN: CPT | Performed by: ORTHOPAEDIC SURGERY

## 2023-01-25 PROCEDURE — 3017F COLORECTAL CA SCREEN DOC REV: CPT | Performed by: ORTHOPAEDIC SURGERY

## 2023-01-25 PROCEDURE — G8417 CALC BMI ABV UP PARAM F/U: HCPCS | Performed by: ORTHOPAEDIC SURGERY

## 2023-01-25 RX ORDER — DICLOFENAC SODIUM 75 MG/1
75 TABLET, DELAYED RELEASE ORAL 2 TIMES DAILY
Qty: 28 TABLET | Refills: 0 | Status: SHIPPED | OUTPATIENT
Start: 2023-01-25

## 2023-01-25 NOTE — PROGRESS NOTES
ORTHOPEDIC PATIENT EVALUATION      HPI / Chief Complaint  Devora Gilbert is a 57 y.o. female who presents for evaluation of her left hand/wrist and elbow. She states that about 2 months ago she was working with some hale galileo and some of them fell landing directly on her left arm. She has been having pain in her left wrist forearm and elbow ever since. She states that it has gotten worse. Her pain is usually worse with use and is described as sharp intermittent pains associated with intermittent swelling. A lot of her pain is localized to the volar/radial aspect of her wrist toward the base of her thumb. She denies having any associated numbness or tingling. Past Medical History  Fito Chatterjee  has a past medical history of Benign paroxysmal vertigo, CKD (chronic kidney disease) stage 3, GFR 30-59 ml/min ([address] Diabetes mellitus, type 2 ([address] Diverticulosis of colon, DVT of lower extremity (deep venous thrombosis) ([address] Esophageal reflux, Hyperlipidemia, Hypertension, Lumbar disc herniation with radiculopathy, and Rotator cuff tear. Past Surgical History  Fito Chatterjee  has a past surgical history that includes Rotator cuff repair (); lumbar discectomy (); Total abdominal hysterectomy w/ bilateral salpingoophorectomy ();  section (, ); lumbar fusion; Spinal fusion (); and Gallbladder surgery.     Current Medications  Current Outpatient Medications   Medication Sig Dispense Refill    omeprazole (PRILOSEC) 20 MG delayed release capsule Take 1 capsule by mouth Daily 30 capsule 5    atorvastatin (LIPITOR) 80 MG tablet Take 1 tablet by mouth daily New dose as discussed 30 tablet 5    metFORMIN (GLUCOPHAGE) 1000 MG tablet Take 1 tablet by mouth daily (with breakfast) 30 tablet 5    DULoxetine (CYMBALTA) 60 MG extended release capsule TAKE 1 CAPSULE twice a day 60 capsule 5    lisinopril (PRINIVIL;ZESTRIL) 40 MG tablet Take 1 tablet by mouth daily 30 tablet 5    metFORMIN (GLUCOPHAGE) 500 MG tablet TAKE 1 TABLET DAILY WITH SUPPER 30 tablet 5    Apoaequorin (PREVAGEN EXTRA STRENGTH) 20 MG CAPS Take by mouth daily To help with memory. Ascorbic Acid (VITAMIN C) 1000 MG tablet Take 1,000 mg by mouth daily      Cholecalciferol (VITAMIN D3) 2000 units CAPS Take 1 capsule by mouth daily 30 capsule 0    DOCUSATE CALCIUM PO Take 1 capsule by mouth daily as needed      naproxen (NAPROSYN) 500 MG tablet Take 1 tablet by mouth daily as needed      aspirin 81 MG EC tablet Take 81 mg by mouth daily. No current facility-administered medications for this visit. Allergies  Allergies have been reviewed. Nubia is allergic to adhesive tape, demerol [meperidine hcl], and penicillin g potassium. Social History  Nubia  reports that she has never smoked. She has been exposed to tobacco smoke. She has never used smokeless tobacco. She reports current alcohol use of about 2.0 - 4.0 standard drinks per week. She reports that she does not use drugs. Family History  Nubia's family history includes Asthma in her maternal grandfather; Heart Attack in her father, maternal grandfather, and paternal grandfather; Kidney Cancer in her brother; Other in her mother; Stroke in her maternal grandmother. Review of Systems   History obtained from the patient. REVIEW OF SYSTEMS:   Constitution: negative for fever, chills, weight loss or malaise   Musculoskeletal: As noted in the HPI   Neurologic: As noted in the HPI    Physical Exam  Resp 16   Ht 5' 3\" (1.6 m)   Wt 170 lb (77.1 kg)   BMI 30.11 kg/m²    General Appearance: alert, well appearing, and in no distress  Mental Status: alert, oriented to person, place, and time  Evaluation of the patient's left hand/wrist and upper extremity demonstrates intact skin without warmth erythema or swelling. Sensation is grossly intact light touch in all dermatomes and she has a 2+ radial pulse with brisk cap refill in her fingers.   She is mildly tender to palpation across the dorsal aspect of her wrist but is more so tender to palpation at the base of her thumb and she has a positive first [facility] grind test.  She can actively flex, extend, abduct and adduct all of her fingers. She has a negative Tinel's at the carpal tunnel. No instability at the DRUJ. Evaluation of her elbow demonstrates full pain-free range of motion. No instability with varus and valgus stress applied across the joint. Imaging Studies  X-rays of the left elbow, forearm, and wrist completed on 1/19/2023 were reviewed independently demonstrating no obvious fracture, dislocation or subluxation. Mild osteophytic change noted at the elbow but no significant joint space loss. Moderate joint space loss noted at the first [facility] joint. No obvious dislocation or subluxation. Assessment and Plan  Kay Arguello is a 57 y.o. old female with left arm pain after an injury as outlined above. Most of her pain at this time is localized to the first [facility] joint where she does have moderate degeneration on her radiographs. I educated both the patient and her  about the nature and extent of her condition and discussed treatment options available to her recommending we proceed conservatively at this time. We discussed use of bracing, ice therapy, and use of NSAIDs versus a cortisone injection. She elected to proceed with the NSAID and so prescription of Voltaren was electronically sent to her pharmacy. She was also provided a thumb spica brace to be worn during the day while active for the next 4 to 6 weeks. I will see her back in my clinic as needed but she may return or call at anytime with persistent or worsening symptoms and with any questions or concerns.     This note is created with the assistance of a speech recognition program.  While intending to generate adocument that actually reflects the content of the visit, the document can still have some errors including those of syntax and sound a like substitutions which may escape proof reading. It such instances, actual meaningcan be extrapolated by contextual diversion.

## 2023-02-16 ENCOUNTER — OFFICE VISIT (OUTPATIENT)
Dept: FAMILY MEDICINE CLINIC | Age: 63
End: 2023-02-16
Payer: COMMERCIAL

## 2023-02-16 VITALS
WEIGHT: 173.2 LBS | DIASTOLIC BLOOD PRESSURE: 100 MMHG | SYSTOLIC BLOOD PRESSURE: 160 MMHG | OXYGEN SATURATION: 97 % | BODY MASS INDEX: 30.68 KG/M2 | RESPIRATION RATE: 18 BRPM | HEART RATE: 84 BPM | TEMPERATURE: 97.3 F

## 2023-02-16 DIAGNOSIS — F33.1 MODERATE EPISODE OF RECURRENT MAJOR DEPRESSIVE DISORDER (HCC): ICD-10-CM

## 2023-02-16 DIAGNOSIS — E11.22 CONTROLLED TYPE 2 DIABETES MELLITUS WITH STAGE 3 CHRONIC KIDNEY DISEASE, WITHOUT LONG-TERM CURRENT USE OF INSULIN (HCC): Primary | ICD-10-CM

## 2023-02-16 DIAGNOSIS — R73.03 PREDIABETES: ICD-10-CM

## 2023-02-16 DIAGNOSIS — G62.9 PERIPHERAL POLYNEUROPATHY: ICD-10-CM

## 2023-02-16 DIAGNOSIS — I10 ESSENTIAL HYPERTENSION: ICD-10-CM

## 2023-02-16 DIAGNOSIS — N18.30 CONTROLLED TYPE 2 DIABETES MELLITUS WITH STAGE 3 CHRONIC KIDNEY DISEASE, WITHOUT LONG-TERM CURRENT USE OF INSULIN (HCC): Primary | ICD-10-CM

## 2023-02-16 DIAGNOSIS — E78.00 PURE HYPERCHOLESTEROLEMIA: ICD-10-CM

## 2023-02-16 PROBLEM — M17.12 UNILATERAL PRIMARY OSTEOARTHRITIS, LEFT KNEE: Status: ACTIVE | Noted: 2022-04-18

## 2023-02-16 PROBLEM — M51.24 THORACIC DISC HERNIATION: Status: ACTIVE | Noted: 2023-02-16

## 2023-02-16 PROBLEM — M16.12 UNILATERAL PRIMARY OSTEOARTHRITIS, LEFT HIP: Status: ACTIVE | Noted: 2022-04-18

## 2023-02-16 LAB — HBA1C MFR BLD: 6.4 %

## 2023-02-16 PROCEDURE — G8417 CALC BMI ABV UP PARAM F/U: HCPCS | Performed by: NURSE PRACTITIONER

## 2023-02-16 PROCEDURE — 83036 HEMOGLOBIN GLYCOSYLATED A1C: CPT | Performed by: NURSE PRACTITIONER

## 2023-02-16 PROCEDURE — 3017F COLORECTAL CA SCREEN DOC REV: CPT | Performed by: NURSE PRACTITIONER

## 2023-02-16 PROCEDURE — 3078F DIAST BP <80 MM HG: CPT | Performed by: NURSE PRACTITIONER

## 2023-02-16 PROCEDURE — G8484 FLU IMMUNIZE NO ADMIN: HCPCS | Performed by: NURSE PRACTITIONER

## 2023-02-16 PROCEDURE — 3044F HG A1C LEVEL LT 7.0%: CPT | Performed by: NURSE PRACTITIONER

## 2023-02-16 PROCEDURE — 1036F TOBACCO NON-USER: CPT | Performed by: NURSE PRACTITIONER

## 2023-02-16 PROCEDURE — 99214 OFFICE O/P EST MOD 30 MIN: CPT | Performed by: NURSE PRACTITIONER

## 2023-02-16 PROCEDURE — G8427 DOCREV CUR MEDS BY ELIG CLIN: HCPCS | Performed by: NURSE PRACTITIONER

## 2023-02-16 PROCEDURE — 3074F SYST BP LT 130 MM HG: CPT | Performed by: NURSE PRACTITIONER

## 2023-02-16 PROCEDURE — 2022F DILAT RTA XM EVC RTNOPTHY: CPT | Performed by: NURSE PRACTITIONER

## 2023-02-16 RX ORDER — METFORMIN HYDROCHLORIDE 500 MG/1
TABLET, EXTENDED RELEASE ORAL
Qty: 90 TABLET | Refills: 5 | Status: SHIPPED | OUTPATIENT
Start: 2023-02-16

## 2023-02-16 RX ORDER — MELOXICAM 15 MG/1
15 TABLET ORAL DAILY
Qty: 90 TABLET | Refills: 1 | Status: SHIPPED | OUTPATIENT
Start: 2023-02-16

## 2023-02-16 RX ORDER — TRAZODONE HYDROCHLORIDE 50 MG/1
50 TABLET ORAL NIGHTLY
Qty: 30 TABLET | Refills: 1 | Status: SHIPPED | OUTPATIENT
Start: 2023-02-16

## 2023-02-16 RX ORDER — LISINOPRIL 40 MG/1
40 TABLET ORAL DAILY
Qty: 30 TABLET | Refills: 5 | Status: SHIPPED | OUTPATIENT
Start: 2023-02-16 | End: 2023-08-15

## 2023-02-16 ASSESSMENT — PATIENT HEALTH QUESTIONNAIRE - PHQ9
2. FEELING DOWN, DEPRESSED OR HOPELESS: 3
SUM OF ALL RESPONSES TO PHQ QUESTIONS 1-9: 24
1. LITTLE INTEREST OR PLEASURE IN DOING THINGS: 3
SUM OF ALL RESPONSES TO PHQ QUESTIONS 1-9: 24
4. FEELING TIRED OR HAVING LITTLE ENERGY: 3
8. MOVING OR SPEAKING SO SLOWLY THAT OTHER PEOPLE COULD HAVE NOTICED. OR THE OPPOSITE, BEING SO FIGETY OR RESTLESS THAT YOU HAVE BEEN MOVING AROUND A LOT MORE THAN USUAL: 2
10. IF YOU CHECKED OFF ANY PROBLEMS, HOW DIFFICULT HAVE THESE PROBLEMS MADE IT FOR YOU TO DO YOUR WORK, TAKE CARE OF THINGS AT HOME, OR GET ALONG WITH OTHER PEOPLE: 1
9. THOUGHTS THAT YOU WOULD BE BETTER OFF DEAD, OR OF HURTING YOURSELF: 3
5. POOR APPETITE OR OVEREATING: 2
SUM OF ALL RESPONSES TO PHQ QUESTIONS 1-9: 21
SUM OF ALL RESPONSES TO PHQ9 QUESTIONS 1 & 2: 6
SUM OF ALL RESPONSES TO PHQ QUESTIONS 1-9: 24
6. FEELING BAD ABOUT YOURSELF - OR THAT YOU ARE A FAILURE OR HAVE LET YOURSELF OR YOUR FAMILY DOWN: 3
3. TROUBLE FALLING OR STAYING ASLEEP: 2
7. TROUBLE CONCENTRATING ON THINGS, SUCH AS READING THE NEWSPAPER OR WATCHING TELEVISION: 3

## 2023-02-16 ASSESSMENT — ANXIETY QUESTIONNAIRES
GAD7 TOTAL SCORE: 13
3. WORRYING TOO MUCH ABOUT DIFFERENT THINGS: 3
1. FEELING NERVOUS, ANXIOUS, OR ON EDGE: 1
6. BECOMING EASILY ANNOYED OR IRRITABLE: 3
7. FEELING AFRAID AS IF SOMETHING AWFUL MIGHT HAPPEN: 1
IF YOU CHECKED OFF ANY PROBLEMS ON THIS QUESTIONNAIRE, HOW DIFFICULT HAVE THESE PROBLEMS MADE IT FOR YOU TO DO YOUR WORK, TAKE CARE OF THINGS AT HOME, OR GET ALONG WITH OTHER PEOPLE: SOMEWHAT DIFFICULT
4. TROUBLE RELAXING: 2
5. BEING SO RESTLESS THAT IT IS HARD TO SIT STILL: 1
2. NOT BEING ABLE TO STOP OR CONTROL WORRYING: 2

## 2023-02-16 ASSESSMENT — ENCOUNTER SYMPTOMS
SHORTNESS OF BREATH: 0
ROS SKIN COMMENTS: FOLLOWS WITH DERMATOLOGY
ABDOMINAL DISTENTION: 0
BACK PAIN: 1
CONSTIPATION: 0
NAUSEA: 0
COUGH: 0
ABDOMINAL PAIN: 0
DIARRHEA: 0
CHEST TIGHTNESS: 0
WHEEZING: 0

## 2023-02-16 ASSESSMENT — COLUMBIA-SUICIDE SEVERITY RATING SCALE - C-SSRS
6. HAVE YOU EVER DONE ANYTHING, STARTED TO DO ANYTHING, OR PREPARED TO DO ANYTHING TO END YOUR LIFE?: NO
1. WITHIN THE PAST MONTH, HAVE YOU WISHED YOU WERE DEAD OR WISHED YOU COULD GO TO SLEEP AND NOT WAKE UP?: YES
2. HAVE YOU ACTUALLY HAD ANY THOUGHTS OF KILLING YOURSELF?: NO

## 2023-02-16 NOTE — PROGRESS NOTES
[address]  [phone]    2/16/23     Patient ID  Lawyer Cid is a 57 y.o. female  Established patient    Chief Complaint  Lawyer Cid presents today for Diabetes, Peripheral Neuropathy, Cholesterol Problem, and Diarrhea (Onset 1-2 years. Hx of gallbladder surgery. Frequency 2-3x per day. )      ASSESSMENT/PLAN  1. Controlled type 2 diabetes mellitus with stage 3 chronic kidney disease, without long-term current use of insulin (HCC)  -      DIABETES FOOT EXAM  -     Comprehensive Metabolic Panel, Fasting; Future  -     Urinalysis with Reflex to Culture; Future  -     Vitamin B12 & Folate; Future  -     Vitamin D 25 Hydroxy; Future  -     metFORMIN (GLUCOPHAGE-XR) 500 MG extended release tablet; Take 1000mg with breakfast and 500mg with dinner, Disp-90 tablet, R-5Normal  2. Peripheral polyneuropathy  -     meloxicam (MOBIC) 15 MG tablet; Take 1 tablet by mouth daily, Disp-90 tablet, R-1Normal  3. Pure hypercholesterolemia  -     Lipid Panel; Future  4. Essential hypertension  -     CBC; Future  -     lisinopril (PRINIVIL;ZESTRIL) 40 MG tablet; Take 1 tablet by mouth daily, Disp-30 tablet, R-5Normal  5. Prediabetes  -      DIABETES FOOT EXAM  -     Microalbumin, Ur; Future  -     POCT glycosylated hemoglobin (Hb A1C)  6. Moderate episode of recurrent major depressive disorder (HCC)  -     traZODone (DESYREL) 50 MG tablet; Take 1 tablet by mouth nightly, Disp-30 tablet, R-1Normal     Medications refilled. Start Trazodone for sleep and mood  Scheduled with [address] [place] [address] - established patient   may need to add HTN medication to ACE???    Return in about 6 weeks (around 3/30/2023) for med check, mental health.       Patient Care Team:  JORDAN Valente CNP as PCP - General (Nurse Practitioner Family)  JORDAN Valente CNP as PCP - Empaneled Provider  Huma Sandoval MD as Physician (Dermatology)  Pablito Rollins as Physician (Gynecology)  Francoise Burnett, PhD (Psychology)    SUBJECTIVE/OBJECTIVE  History of Present illness / Visit Summary   Patient presents today for follow up   Reviewed health maintenance and any recent labs/imaging        1/25/2023 [facility]-   [facility] / Chief Complaint  Irasema Delvalle is a 57 y.o. female who presents for evaluation of her left hand/wrist and elbow. She states that about 2 months ago she was working with some hale galileo and some of them fell landing directly on her left arm. She has been having pain in her left wrist forearm and elbow ever since. She states that it has gotten worse. Her pain is usually worse with use and is described as sharp intermittent pains associated with intermittent swelling. A lot of her pain is localized to the volar/radial aspect of her wrist toward the base of her thumb. She denies having any associated numbness or tingling    Review of Systems  Review of Systems   Constitutional:  Positive for fatigue. Negative for activity change, appetite change, chills and fever. Respiratory:  Negative for cough, chest tightness, shortness of breath and wheezing. ARNIE, no CPAP, wears mouth guard    Cardiovascular:  Positive for palpitations. Negative for chest pain and leg swelling. Gastrointestinal:  Negative for abdominal distention, abdominal pain, constipation, diarrhea (constant loose stools) and nausea. Hx Diverticulosis   Genitourinary:  Negative for difficulty urinating, hematuria and urgency. Musculoskeletal:  Positive for arthralgias (left hip, left knee) and back pain (chronic ). Negative for gait problem and joint swelling. Chiropractor/massage once a month   Skin:  Negative for rash and wound. Follows with dermatology   Allergic/Immunologic: Positive for environmental allergies. Negative for food allergies. Neurological:  Positive for numbness (bilateral hands. left foot). Negative for dizziness, syncope, light-headedness and headaches. Psychiatric/Behavioral:  Positive for agitation and sleep disturbance. Negative for self-injury and suicidal ideas. The patient is nervous/anxious (worsening). Physical exam   Physical Exam  Vitals and nursing note reviewed. Constitutional:       General: She is not in acute distress. Appearance: Normal appearance. She is well-developed, well-groomed and overweight. She is not ill-appearing or toxic-appearing. Cardiovascular:      Rate and Rhythm: Normal rate and regular rhythm. No extrasystoles are present. Heart sounds: Normal heart sounds, S1 normal and S2 normal. No murmur heard. Pulmonary:      Effort: Pulmonary effort is normal. No prolonged expiration or respiratory distress. Breath sounds: Normal breath sounds and air entry. Musculoskeletal:      Right lower leg: No edema. Left lower leg: No edema. Feet:      Right foot:      Protective Sensation: 10 sites tested. 10 sites sensed. Skin integrity: Skin integrity normal.      Toenail Condition: Right toenails are normal.      Left foot:      Protective Sensation: 10 sites tested. 10 sites sensed. Skin integrity: Skin integrity normal.      Toenail Condition: Left toenails are normal.   Skin:     General: Skin is warm and dry. Coloration: Skin is not ashen, cyanotic, jaundiced or pale. Neurological:      Mental Status: She is alert and oriented to person, place, and time. Motor: Motor function is intact. Gait: Gait is intact. Psychiatric:         Attention and Perception: Attention and perception normal.         Mood and Affect: Affect normal. Mood is anxious and depressed. Speech: Speech normal.         Behavior: Behavior normal. Behavior is cooperative. Thought Content: Thought content normal. Thought content does not include suicidal ideation. Thought content does not include suicidal plan.          Cognition and Memory: Cognition and memory normal.         Judgment: Judgment normal.         Electronically signed by JORDAN Polo CNP, APRN-CNP on 2/27/2023 at 10:00 PM    Please note that this chart was generated using voice recognition Dragon dictation software. Although every effort was made to ensure the accuracy of this automated transcription, some errors in transcription may have occurred.

## 2023-02-23 ENCOUNTER — HOSPITAL ENCOUNTER (OUTPATIENT)
Facility: CLINIC | Age: 63
Discharge: HOME | End: 2023-02-23
Payer: COMMERCIAL

## 2023-02-23 LAB
EKG ATRIAL RATE: 64 BPM
EKG P AXIS: 27 DEGREES
EKG P-R INTERVAL: 196 MS
EKG Q-T INTERVAL: 450 MS
EKG QRS DURATION: 162 MS
EKG QTC CALCULATION (BAZETT): 464 MS
EKG R AXIS: -84 DEGREES
EKG T AXIS: 29 DEGREES
EKG VENTRICULAR RATE: 64 BPM

## 2023-02-23 PROCEDURE — 93005 ELECTROCARDIOGRAM TRACING: CPT | Performed by: EMERGENCY MEDICINE

## 2023-03-02 ENCOUNTER — OFFICE VISIT (OUTPATIENT)
Dept: BEHAVIORAL/MENTAL HEALTH CLINIC | Age: 63
End: 2023-03-02

## 2023-03-02 DIAGNOSIS — F34.1 PERSISTENT DEPRESSIVE DISORDER: Primary | ICD-10-CM

## 2023-03-02 PROCEDURE — 90832 PSYTX W PT 30 MINUTES: CPT | Performed by: SOCIAL WORKER

## 2023-03-02 NOTE — PROGRESS NOTES
ADULT BEHAVIORAL HEALTH FOLLOW UP  TIFFANIE Newberry  Licensed Independent        Visit Date: 3/2/2023   Time of appointment:  4788-8644H   Time spent with Patient: 34 minutes. This is patient's fourth appointment. Reason for Consult:  Depression and Stress     Referring Provider/PCP:    No ref. provider found  JORDAN Torres - CNP      Pt provided informed consent for the behavioral health program. Discussed with patient model of service to include the limits of confidentiality (i.e. abuse reporting, suicide intervention, etc.) and short-term intervention focused approach. Pt indicated understanding. Sarah Akhtar is a 57 y.o. female who presents for follow up of depression and stress. She reviewed recent life stress over last few months, especially continued difficulty with  and new health issues. She states her health issues have been a wake up call to make changes for herself, plans to prioritize her needs, states \"I've put myself on hold for too long\". We processed possible barriers to this and how to handle in order to continue with this plan. She reports she has been reviewing her life a lot lately, we processed Jonothan Barthel stages and how in this time of life the goal is to accept what has happened in our life and come to peace. We reviewed ways to do so, with reminder she did the best she could with the choices available to her and she has survived it all. Previous Recommendations: Juan Díaz will continue to say no and prioritize herself. She will follow up with Isabel Bourne in 3 weeks. MENTAL STATUS EXAM  Mood was sad with sad  affect. Suicidal ideation was denied. Homicidal ideation was denied. Hygiene was fair . Dress was appropriate. Behavior was Within Normal Limits with No observation or self-report of difficulties ambulating. Attitude was Cooperative. Eye-contact was good. Speech: rate - WNL, rhythm - WNL, volume - WNL.   Verbalizations were coherent. Thought processes were intact and goal-oriented without evidence of delusions, hallucinations, obsessions, or kimmie; with moderate cognitive distortions. Associations were characterized by intact cognitive processes. Pt was oriented to person, place, time, and general circumstances;  recent:  good and remote:  good. Insight and judgment were estimated to be fair, AEB, a fair  understanding of cyclical maladaptive patterns, and the ability to use insight to inform behavior change. ASSESSMENT  Virgen Tierney presented to the appointment today for evaluation and treatment of symptoms of depression. She is currently deemed no risk to herself or others and meets criteria for PDD. Nubia was in agreement with recommendations. PHQ Scores 2/16/2023 3/23/2022 4/23/2018 6/6/2016   PHQ2 Score 6 6 6 4   PHQ9 Score 24 13 12 16     Interpretation of Total Score Depression Severity: 1-4 = Minimal depression, 5-9 = Mild depression, 10-14 = Moderate depression, 15-19 = Moderately severe depression, 20-27 = Severe depression    How often pt has had thoughts of death or hurting self (if PHQ positive for depression):       JHONY 7 SCORE 2/16/2023   JHONY-7 Total Score 13     Interpretation of JHONY-7 score: 5-9 = mild anxiety, 10-14 = moderate anxiety, 15+ = severe anxiety. Recommend referral to behavioral health for scores 10 or greater. DIAGNOSIS  Nubia was seen today for depression and stress. Diagnoses and all orders for this visit:    Persistent depressive disorder with anxious distress        INTERVENTION  Trained in strategies for increasing balanced thinking, Discussed self-care (sleep, nutrition, rewarding activities, social support, exercise), Discussed potential barriers to change, and Supportive techniques      Roel Rm will prioritize herself and her needs. She will follow up with Josette Rangel in 1 month. INTERACTIVE COMPLEXITY  Is interactive complexity present?   No  Reason: N/A  Additional Supporting Information:  N/A       Electronically signed by TIFFANIE Freeman on 3/2/23 at 10:02 AM EST

## 2023-03-13 ENCOUNTER — HOSPITAL ENCOUNTER (OUTPATIENT)
Dept: NON INVASIVE DIAGNOSTICS | Age: 63
Discharge: HOME | End: 2023-03-15
Payer: COMMERCIAL

## 2023-03-13 DIAGNOSIS — I10 ESSENTIAL HYPERTENSION: ICD-10-CM

## 2023-03-13 LAB
LV EF: 60 %
LVEF MODALITY: NORMAL

## 2023-03-13 PROCEDURE — 93306 TTE W/DOPPLER COMPLETE: CPT

## 2023-03-14 ENCOUNTER — HOSPITAL ENCOUNTER (OUTPATIENT)
Age: 63
Setting detail: SPECIMEN
Discharge: HOME | End: 2023-03-14

## 2023-03-14 DIAGNOSIS — E11.22 CONTROLLED TYPE 2 DIABETES MELLITUS WITH STAGE 3 CHRONIC KIDNEY DISEASE, WITHOUT LONG-TERM CURRENT USE OF INSULIN (HCC): ICD-10-CM

## 2023-03-14 DIAGNOSIS — E78.00 PURE HYPERCHOLESTEROLEMIA: ICD-10-CM

## 2023-03-14 DIAGNOSIS — R73.03 PREDIABETES: ICD-10-CM

## 2023-03-14 DIAGNOSIS — I10 ESSENTIAL HYPERTENSION: ICD-10-CM

## 2023-03-14 DIAGNOSIS — N18.30 CONTROLLED TYPE 2 DIABETES MELLITUS WITH STAGE 3 CHRONIC KIDNEY DISEASE, WITHOUT LONG-TERM CURRENT USE OF INSULIN (HCC): ICD-10-CM

## 2023-03-14 LAB
BILIRUBIN URINE: NEGATIVE
CASTS UA: NORMAL /LPF (ref 0–8)
COLOR: ABNORMAL
EPITHELIAL CELLS UA: NORMAL /HPF (ref 0–5)
GLUCOSE UR STRIP.AUTO-MCNC: NEGATIVE MG/DL
HCT VFR BLD AUTO: 41.1 % (ref 36.3–47.1)
HGB BLD-MCNC: 13.3 G/DL (ref 11.9–15.1)
KETONES UR STRIP.AUTO-MCNC: ABNORMAL MG/DL
LEUKOCYTE ESTERASE UR QL STRIP.AUTO: ABNORMAL
MCH RBC QN AUTO: 29.4 PG (ref 25.2–33.5)
MCHC RBC AUTO-ENTMCNC: 32.4 G/DL (ref 28.4–34.8)
MCV RBC AUTO: 90.7 FL (ref 82.6–102.9)
NITRITE UR QL STRIP.AUTO: NEGATIVE
NRBC AUTOMATED: 0 PER 100 WBC
PDW BLD-RTO: 12.7 % (ref 11.8–14.4)
PLATELET # BLD AUTO: 290 K/UL (ref 138–453)
PMV BLD AUTO: 10.9 FL (ref 8.1–13.5)
PROT UR STRIP.AUTO-MCNC: 6 MG/DL (ref 5–8)
PROT UR STRIP.AUTO-MCNC: ABNORMAL MG/DL
RBC # BLD: 4.53 M/UL (ref 3.95–5.11)
RBC CLUMPS #/AREA URNS AUTO: NORMAL /HPF (ref 0–4)
SPECIFIC GRAVITY UA: 1.02 (ref 1–1.03)
TURBIDITY: ABNORMAL
URINE HGB: NEGATIVE
UROBILINOGEN, URINE: NORMAL
WBC # BLD AUTO: 8.6 K/UL (ref 3.5–11.3)
WBC UA: NORMAL /HPF (ref 0–5)

## 2023-03-15 LAB
25(OH)D3 SERPL-MCNC: 32.3 NG/ML
ALBUMIN SERPL-MCNC: 4.4 G/DL (ref 3.5–5.2)
ALBUMIN/GLOBULIN RATIO: 1.9 (ref 1–2.5)
ALP SERPL-CCNC: 94 U/L (ref 35–104)
ALT SERPL-CCNC: 33 U/L (ref 5–33)
ANION GAP SERPL CALCULATED.3IONS-SCNC: 17 MMOL/L (ref 9–17)
AST SERPL-CCNC: 26 U/L
BILIRUB SERPL-MCNC: 0.5 MG/DL (ref 0.3–1.2)
BUN SERPL-MCNC: 16 MG/DL (ref 8–23)
CALCIUM SERPL-MCNC: 10.1 MG/DL (ref 8.6–10.4)
CHLORIDE SERPL-SCNC: 104 MMOL/L (ref 98–107)
CHOLEST SERPL-MCNC: 167 MG/DL
CHOLESTEROL/HDL RATIO: 3.4
CO2 SERPL-SCNC: 22 MMOL/L (ref 20–31)
CREAT SERPL-MCNC: 0.74 MG/DL (ref 0.5–0.9)
FOLATE SERPL-MCNC: NORMAL NG/ML
GFR SERPL CREATININE-BSD FRML MDRD: >60 ML/MIN/1.73M2
GLUCOSE SERPL-MCNC: 113 MG/DL (ref 70–99)
HDLC SERPL-MCNC: 49 MG/DL
LDLC SERPL CALC-MCNC: 89 MG/DL (ref 0–130)
POTASSIUM SERPL-SCNC: 4.5 MMOL/L (ref 3.7–5.3)
PROT SERPL-MCNC: 6.7 G/DL (ref 6.4–8.3)
SODIUM SERPL-SCNC: 143 MMOL/L (ref 135–144)
TRIGL SERPL-MCNC: 147 MG/DL
VIT B12 SERPL-MCNC: 494 PG/ML (ref 232–1245)

## 2023-03-16 DIAGNOSIS — I71.40 ABDOMINAL AORTIC ANEURYSM (AAA) 3.0 CM TO 5.0 CM IN DIAMETER IN FEMALE: ICD-10-CM

## 2023-03-16 DIAGNOSIS — I51.89 DIASTOLIC DYSFUNCTION: Primary | ICD-10-CM

## 2023-03-16 DIAGNOSIS — I10 ESSENTIAL HYPERTENSION: ICD-10-CM

## 2023-03-16 RX ORDER — HYDROCHLOROTHIAZIDE 12.5 MG/1
12.5 CAPSULE, GELATIN COATED ORAL EVERY MORNING
Qty: 30 CAPSULE | Refills: 1 | Status: SHIPPED | OUTPATIENT
Start: 2023-03-16 | End: 2024-03-15

## 2023-03-16 RX ORDER — CARVEDILOL 6.25 MG/1
6.25 TABLET ORAL 2 TIMES DAILY WITH MEALS
Qty: 60 TABLET | Refills: 1 | Status: SHIPPED | OUTPATIENT
Start: 2023-03-16 | End: 2024-03-15

## 2023-03-27 ENCOUNTER — OFFICE VISIT (OUTPATIENT)
Dept: FAMILY MEDICINE CLINIC | Age: 63
End: 2023-03-27
Payer: COMMERCIAL

## 2023-03-27 VITALS
OXYGEN SATURATION: 98 % | SYSTOLIC BLOOD PRESSURE: 118 MMHG | DIASTOLIC BLOOD PRESSURE: 80 MMHG | WEIGHT: 170.6 LBS | HEART RATE: 66 BPM | RESPIRATION RATE: 16 BRPM | BODY MASS INDEX: 30.22 KG/M2 | TEMPERATURE: 97.4 F

## 2023-03-27 DIAGNOSIS — F33.1 MODERATE EPISODE OF RECURRENT MAJOR DEPRESSIVE DISORDER (HCC): ICD-10-CM

## 2023-03-27 DIAGNOSIS — F51.04 PSYCHOPHYSIOLOGICAL INSOMNIA: ICD-10-CM

## 2023-03-27 DIAGNOSIS — F41.9 ANXIETY: ICD-10-CM

## 2023-03-27 DIAGNOSIS — I10 ESSENTIAL HYPERTENSION: Primary | ICD-10-CM

## 2023-03-27 PROCEDURE — 3017F COLORECTAL CA SCREEN DOC REV: CPT | Performed by: NURSE PRACTITIONER

## 2023-03-27 PROCEDURE — 3079F DIAST BP 80-89 MM HG: CPT | Performed by: NURSE PRACTITIONER

## 2023-03-27 PROCEDURE — G8417 CALC BMI ABV UP PARAM F/U: HCPCS | Performed by: NURSE PRACTITIONER

## 2023-03-27 PROCEDURE — G8484 FLU IMMUNIZE NO ADMIN: HCPCS | Performed by: NURSE PRACTITIONER

## 2023-03-27 PROCEDURE — 1036F TOBACCO NON-USER: CPT | Performed by: NURSE PRACTITIONER

## 2023-03-27 PROCEDURE — G8427 DOCREV CUR MEDS BY ELIG CLIN: HCPCS | Performed by: NURSE PRACTITIONER

## 2023-03-27 PROCEDURE — 99214 OFFICE O/P EST MOD 30 MIN: CPT | Performed by: NURSE PRACTITIONER

## 2023-03-27 PROCEDURE — 3074F SYST BP LT 130 MM HG: CPT | Performed by: NURSE PRACTITIONER

## 2023-03-27 RX ORDER — TRAZODONE HYDROCHLORIDE 100 MG/1
100 TABLET ORAL NIGHTLY
Qty: 30 TABLET | Refills: 1 | Status: SHIPPED | OUTPATIENT
Start: 2023-03-27 | End: 2024-03-26

## 2023-03-27 SDOH — ECONOMIC STABILITY: INCOME INSECURITY: HOW HARD IS IT FOR YOU TO PAY FOR THE VERY BASICS LIKE FOOD, HOUSING, MEDICAL CARE, AND HEATING?: NOT HARD AT ALL

## 2023-03-27 SDOH — ECONOMIC STABILITY: FOOD INSECURITY: WITHIN THE PAST 12 MONTHS, THE FOOD YOU BOUGHT JUST DIDN'T LAST AND YOU DIDN'T HAVE MONEY TO GET MORE.: NEVER TRUE

## 2023-03-27 SDOH — ECONOMIC STABILITY: HOUSING INSECURITY
IN THE LAST 12 MONTHS, WAS THERE A TIME WHEN YOU DID NOT HAVE A STEADY PLACE TO SLEEP OR SLEPT IN A SHELTER (INCLUDING NOW)?: NO

## 2023-03-27 SDOH — ECONOMIC STABILITY: FOOD INSECURITY: WITHIN THE PAST 12 MONTHS, YOU WORRIED THAT YOUR FOOD WOULD RUN OUT BEFORE YOU GOT MONEY TO BUY MORE.: NEVER TRUE

## 2023-03-27 SDOH — ECONOMIC STABILITY: TRANSPORTATION INSECURITY
IN THE PAST 12 MONTHS, HAS LACK OF TRANSPORTATION KEPT YOU FROM MEETINGS, WORK, OR FROM GETTING THINGS NEEDED FOR DAILY LIVING?: NO

## 2023-03-27 ASSESSMENT — ENCOUNTER SYMPTOMS
CHEST TIGHTNESS: 0
SHORTNESS OF BREATH: 0
ABDOMINAL PAIN: 0
COUGH: 0
BACK PAIN: 1
NAUSEA: 0
WHEEZING: 0
ROS SKIN COMMENTS: FOLLOWS WITH DERMATOLOGY
ABDOMINAL DISTENTION: 0
DIARRHEA: 0
CONSTIPATION: 0

## 2023-03-27 NOTE — PROGRESS NOTES
nervous/anxious. Physical exam   Physical Exam  Vitals and nursing note reviewed. Constitutional:       General: She is not in acute distress. Appearance: Normal appearance. She is well-developed and well-groomed. She is obese. She is not ill-appearing or toxic-appearing. Cardiovascular:      Rate and Rhythm: Normal rate and regular rhythm. No extrasystoles are present. Heart sounds: Normal heart sounds, S1 normal and S2 normal. No murmur heard. Pulmonary:      Effort: Pulmonary effort is normal. No prolonged expiration or respiratory distress. Breath sounds: Normal breath sounds and air entry. Musculoskeletal:      Right lower leg: No edema. Left lower leg: No edema. Skin:     General: Skin is warm and dry. Coloration: Skin is not ashen, cyanotic, jaundiced or pale. Neurological:      Mental Status: She is alert and oriented to person, place, and time. Motor: Motor function is intact. Gait: Gait is intact. Psychiatric:         Attention and Perception: Attention and perception normal.         Mood and Affect: Mood is anxious. Affect is flat. Speech: Speech normal.         Behavior: Behavior normal. Behavior is cooperative. Thought Content: Thought content normal. Thought content does not include suicidal ideation. Thought content does not include suicidal plan. Cognition and Memory: Cognition and memory normal.         Judgment: Judgment normal.         Electronically signed by JORDAN Hutchinson CNP, APRN-CNP on 3/27/2023 at 10:47 AM    Please note that this chart was generated using voice recognition Dragon dictation software. Although every effort was made to ensure the accuracy of this automated transcription, some errors in transcription may have occurred.

## 2023-03-30 ENCOUNTER — OFFICE VISIT (OUTPATIENT)
Dept: BEHAVIORAL/MENTAL HEALTH CLINIC | Age: 63
End: 2023-03-30

## 2023-03-30 DIAGNOSIS — F34.1 PERSISTENT DEPRESSIVE DISORDER: Primary | ICD-10-CM

## 2023-03-30 LAB
FOLATE SERPL-MCNC: 11.1 NG/ML
VIT B12 SERPL-MCNC: 494 PG/ML (ref 232–1245)

## 2023-03-30 PROCEDURE — 90832 PSYTX W PT 30 MINUTES: CPT | Performed by: SOCIAL WORKER

## 2023-03-30 NOTE — PROGRESS NOTES
ADULT BEHAVIORAL HEALTH FOLLOW UP  Yue TIFFANIE Levine  Licensed Independent        Visit Date: 3/30/2023   Time of appointment:  [id number]   Time spent with Patient: 22 minutes. This is patient's fifth appointment. Reason for Consult:  Depression and Anxiety     Referring Provider/PCP:    No ref. provider found  JORDAN Hardy CNP      Pt provided informed consent for the behavioral health program. Discussed with patient model of service to include the limits of confidentiality (i.e. abuse reporting, suicide intervention, etc.) and short-term intervention focused approach. Pt indicated understanding. Gianni Brady is a 57 y.o. female who presents for follow up of depression and anxiety. She reports doing well, continues to work on focusing on herself. She is decreasing engagements in negative communication with , working to not take personally things he says. She is also decreasing her personal responsibility for his problems, feels this is very freeing. We processed hopes for the future and plans that she can engage in to continue focus on herself. Previous Recommendations: Jeremiah Weiss will prioritize herself and her needs. She will follow up with Livia Nicholas in 1 month. MENTAL STATUS EXAM  Mood was within normal limits with calm affect. Suicidal ideation was denied. Homicidal ideation was denied. Hygiene was good . Dress was appropriate. Behavior was Within Normal Limits with No observation or self-report of difficulties ambulating. Attitude was Cooperative. Eye-contact was good. Speech: rate - WNL, rhythm - WNL, volume - WNL. Verbalizations were coherent. Thought processes were intact and goal-oriented without evidence of delusions, hallucinations, obsessions, or kimmie; with moderate cognitive distortions. Associations were characterized by intact cognitive processes.   Pt was oriented to person, place, time, and general circumstances;  recent:  good

## 2023-04-07 ENCOUNTER — OFFICE VISIT (OUTPATIENT)
Dept: FAMILY MEDICINE CLINIC | Age: 63
End: 2023-04-07

## 2023-04-07 VITALS
HEART RATE: 55 BPM | RESPIRATION RATE: 16 BRPM | DIASTOLIC BLOOD PRESSURE: 80 MMHG | TEMPERATURE: 97.6 F | WEIGHT: 172 LBS | OXYGEN SATURATION: 97 % | SYSTOLIC BLOOD PRESSURE: 118 MMHG | BODY MASS INDEX: 30.47 KG/M2

## 2023-04-07 DIAGNOSIS — I51.89 DIASTOLIC DYSFUNCTION: ICD-10-CM

## 2023-04-07 DIAGNOSIS — I10 ESSENTIAL HYPERTENSION: ICD-10-CM

## 2023-04-07 DIAGNOSIS — E78.00 PURE HYPERCHOLESTEROLEMIA: ICD-10-CM

## 2023-04-07 DIAGNOSIS — K21.9 GASTROESOPHAGEAL REFLUX DISEASE, UNSPECIFIED WHETHER ESOPHAGITIS PRESENT: ICD-10-CM

## 2023-04-07 DIAGNOSIS — F33.1 MODERATE EPISODE OF RECURRENT MAJOR DEPRESSIVE DISORDER (HCC): ICD-10-CM

## 2023-04-07 DIAGNOSIS — N18.30 CONTROLLED TYPE 2 DIABETES MELLITUS WITH STAGE 3 CHRONIC KIDNEY DISEASE, WITHOUT LONG-TERM CURRENT USE OF INSULIN (HCC): Primary | ICD-10-CM

## 2023-04-07 DIAGNOSIS — E11.22 CONTROLLED TYPE 2 DIABETES MELLITUS WITH STAGE 3 CHRONIC KIDNEY DISEASE, WITHOUT LONG-TERM CURRENT USE OF INSULIN (HCC): Primary | ICD-10-CM

## 2023-04-07 PROBLEM — R06.02 SOB (SHORTNESS OF BREATH) ON EXERTION: Status: ACTIVE | Noted: 2023-03-22

## 2023-04-07 PROBLEM — I77.810 DILATED AORTIC ROOT (HCC): Status: ACTIVE | Noted: 2023-03-22

## 2023-04-07 PROBLEM — R94.31 ABNORMAL ECG: Status: ACTIVE | Noted: 2023-03-22

## 2023-04-07 PROBLEM — R00.2 HEART PALPITATIONS: Status: ACTIVE | Noted: 2023-03-22

## 2023-04-07 PROBLEM — I45.10 RIGHT BUNDLE BRANCH BLOCK: Status: ACTIVE | Noted: 2023-03-22

## 2023-04-07 PROCEDURE — 3074F SYST BP LT 130 MM HG: CPT | Performed by: NURSE PRACTITIONER

## 2023-04-07 PROCEDURE — 3044F HG A1C LEVEL LT 7.0%: CPT | Performed by: NURSE PRACTITIONER

## 2023-04-07 PROCEDURE — 3078F DIAST BP <80 MM HG: CPT | Performed by: NURSE PRACTITIONER

## 2023-04-07 PROCEDURE — 99214 OFFICE O/P EST MOD 30 MIN: CPT | Performed by: NURSE PRACTITIONER

## 2023-04-07 RX ORDER — GLUCOSAMINE HCL/CHONDROITIN SU 500-400 MG
100 CAPSULE ORAL 2 TIMES DAILY PRN
Qty: 100 STRIP | Refills: 5 | Status: SHIPPED | OUTPATIENT
Start: 2023-04-07 | End: 2024-04-06

## 2023-04-07 RX ORDER — HYDROCHLOROTHIAZIDE 12.5 MG/1
12.5 CAPSULE, GELATIN COATED ORAL EVERY MORNING
Qty: 30 CAPSULE | Refills: 5 | Status: SHIPPED | OUTPATIENT
Start: 2023-04-07 | End: 2024-04-06

## 2023-04-07 RX ORDER — DULOXETIN HYDROCHLORIDE 60 MG/1
CAPSULE, DELAYED RELEASE ORAL
Qty: 60 CAPSULE | Refills: 5 | Status: SHIPPED | OUTPATIENT
Start: 2023-04-07

## 2023-04-07 RX ORDER — ATORVASTATIN CALCIUM 80 MG/1
80 TABLET, FILM COATED ORAL DAILY
Qty: 30 TABLET | Refills: 5 | Status: SHIPPED | OUTPATIENT
Start: 2023-04-07 | End: 2023-05-07

## 2023-04-07 RX ORDER — OMEPRAZOLE 20 MG/1
20 CAPSULE, DELAYED RELEASE ORAL DAILY
Qty: 30 CAPSULE | Refills: 5 | Status: SHIPPED | OUTPATIENT
Start: 2023-04-07 | End: 2023-05-07

## 2023-04-07 RX ORDER — LISINOPRIL 40 MG/1
40 TABLET ORAL DAILY
Qty: 30 TABLET | Refills: 5 | Status: SHIPPED | OUTPATIENT
Start: 2023-04-07 | End: 2024-04-06

## 2023-04-07 ASSESSMENT — ENCOUNTER SYMPTOMS
CONSTIPATION: 0
SHORTNESS OF BREATH: 0
COUGH: 0
ABDOMINAL DISTENTION: 0
CHEST TIGHTNESS: 0
BACK PAIN: 1
DIARRHEA: 0
ROS SKIN COMMENTS: FOLLOWS WITH DERMATOLOGY
NAUSEA: 0
ABDOMINAL PAIN: 0
WHEEZING: 0

## 2023-04-18 DIAGNOSIS — F33.1 MODERATE EPISODE OF RECURRENT MAJOR DEPRESSIVE DISORDER (HCC): Primary | ICD-10-CM

## 2023-04-18 DIAGNOSIS — I10 ESSENTIAL HYPERTENSION: ICD-10-CM

## 2023-04-19 RX ORDER — AMLODIPINE BESYLATE 10 MG/1
10 TABLET ORAL NIGHTLY
Qty: 30 TABLET | Refills: 2 | Status: SHIPPED | OUTPATIENT
Start: 2023-04-19 | End: 2024-04-18

## 2023-04-19 RX ORDER — TRAZODONE HYDROCHLORIDE 100 MG/1
100 TABLET ORAL NIGHTLY
Qty: 30 TABLET | Refills: 2 | Status: SHIPPED | OUTPATIENT
Start: 2023-04-19 | End: 2024-04-18

## 2023-04-24 ENCOUNTER — PATIENT MESSAGE (OUTPATIENT)
Dept: FAMILY MEDICINE CLINIC | Age: 63
End: 2023-04-24

## 2023-04-25 ENCOUNTER — OFFICE VISIT (OUTPATIENT)
Dept: BEHAVIORAL/MENTAL HEALTH CLINIC | Age: 63
End: 2023-04-25

## 2023-04-25 DIAGNOSIS — F34.1 PERSISTENT DEPRESSIVE DISORDER: Primary | ICD-10-CM

## 2023-04-25 PROCEDURE — 90834 PSYTX W PT 45 MINUTES: CPT | Performed by: SOCIAL WORKER

## 2023-04-25 NOTE — PROGRESS NOTES
ADULT BEHAVIORAL HEALTH FOLLOW UP  TIFFANIE Lunsford  Licensed Independent        Visit Date: 4/25/2023   Time of appointment:  [id number]   Time spent with Patient: 40 minutes. This is patient's sixth appointment. Reason for Consult:  Depression     Referring Provider/PCP:    No ref. provider found  JORDAN Contreras - CNP      Pt provided informed consent for the behavioral health program. Discussed with patient model of service to include the limits of confidentiality (i.e. abuse reporting, suicide intervention, etc.) and short-term intervention focused approach. Pt indicated understanding. Curt Marquez is a 57 y.o. female who presents for follow up of depression. She reports not doing well, having a rough few days. She reports no change, struggling to cope with 's moods and negative words. We processed options for herself, with focus on ways to get out of the home more. She was encouraged to do the best she can to remove herself when it gets too difficult and not allow him to ruin her mood, with validation that this is hard and sometimes won't be possible. She was encouraged to do the best she can to focus on making herself as happy as possible. Previous Recommendations: Yusuf Aly will continue healthy focus on herself. She will follow up with Lawrence Moreno in 1 month. MENTAL STATUS EXAM  Mood was depressed with depressed affect. Suicidal ideation was denied. Homicidal ideation was denied. Hygiene was good . Dress was appropriate. Behavior was Within Normal Limits with No observation or self-report of difficulties ambulating. Attitude was Cooperative. Eye-contact was good. Speech: rate - WNL, rhythm - WNL, volume - WNL. Verbalizations were coherent. Thought processes were intact and goal-oriented without evidence of delusions, hallucinations, obsessions, or kimmie; with moderate cognitive distortions.    Associations were characterized by intact cognitive

## 2023-05-03 LAB — MAMMOGRAPHY, EXTERNAL: NORMAL

## 2023-05-08 DIAGNOSIS — I51.89 DIASTOLIC DYSFUNCTION: ICD-10-CM

## 2023-05-08 DIAGNOSIS — I10 ESSENTIAL HYPERTENSION: ICD-10-CM

## 2023-05-09 DIAGNOSIS — I10 ESSENTIAL HYPERTENSION: ICD-10-CM

## 2023-05-09 DIAGNOSIS — I51.89 DIASTOLIC DYSFUNCTION: ICD-10-CM

## 2023-05-09 RX ORDER — CARVEDILOL 6.25 MG/1
TABLET ORAL
Qty: 60 TABLET | Refills: 2 | Status: SHIPPED | OUTPATIENT
Start: 2023-05-09

## 2023-05-09 RX ORDER — HYDROCHLOROTHIAZIDE 12.5 MG/1
12.5 TABLET ORAL DAILY
Qty: 30 TABLET | Refills: 5 | Status: SHIPPED | OUTPATIENT
Start: 2023-05-09 | End: 2024-05-08

## 2023-05-19 ENCOUNTER — TELEPHONE (OUTPATIENT)
Dept: CARDIOTHORACIC SURGERY | Age: 63
End: 2023-05-19

## 2023-05-19 NOTE — TELEPHONE ENCOUNTER
LVM for the patient to call the office back to get scheduled for a consult, 1st attempt, 1st letter sent

## 2023-05-23 ENCOUNTER — OFFICE VISIT (OUTPATIENT)
Dept: BEHAVIORAL/MENTAL HEALTH CLINIC | Age: 63
End: 2023-05-23

## 2023-05-23 DIAGNOSIS — F34.1 PERSISTENT DEPRESSIVE DISORDER: Primary | ICD-10-CM

## 2023-05-23 PROCEDURE — 90832 PSYTX W PT 30 MINUTES: CPT | Performed by: SOCIAL WORKER

## 2023-05-23 NOTE — PROGRESS NOTES
ADULT BEHAVIORAL HEALTH FOLLOW UP  TIFFANIE Smith  Licensed Independent        Visit Date: 5/23/2023   Time of appointment:  [id number]   Time spent with Patient: 32 minutes. This is patient's seventh appointment. Reason for Consult:  Depression     Referring Provider/PCP:    No ref. provider found  JORDAN Adams - CNP      Pt provided informed consent for the behavioral health program. Discussed with patient model of service to include the limits of confidentiality (i.e. abuse reporting, suicide intervention, etc.) and short-term intervention focused approach. Pt indicated understanding. Annette Harris is a 57 y.o. female who presents for follow up of depression. She presents as tired and depressed, reports feeling she has a lot of things to sort out in her mind. She reports thinking about the past, regrets, and things she would like to say now. We processed communication options for this as well as pros and cons of trying. She reviewed current frustrations in the home, needing to either accept this or make a change. We problem solved barriers to making change and options in the future. She reports desire to handle medical issues first then consider those options. Previous Recommendations: Kaia Merritt will work to focus on her own needs. She will follow up with Andrea Wakefield in 1 month. MENTAL STATUS EXAM  Mood was sad with sad  affect. Suicidal ideation was denied. Homicidal ideation was denied. Hygiene was good . Dress was appropriate. Behavior was Within Normal Limits with No observation or self-report of difficulties ambulating. Attitude was Cooperative. Eye-contact was good. Speech: rate - WNL, rhythm - WNL, volume - WNL. Verbalizations were coherent. Thought processes were intact and goal-oriented without evidence of delusions, hallucinations, obsessions, or kimmie; with moderate cognitive distortions.    Associations were characterized by intact cognitive

## 2023-05-26 DIAGNOSIS — I71.21 ANEURYSM OF ASCENDING AORTA WITHOUT RUPTURE (HCC): Primary | ICD-10-CM

## 2023-06-05 ENCOUNTER — HOSPITAL ENCOUNTER (OUTPATIENT)
Dept: CT IMAGING | Age: 63
Discharge: HOME | End: 2023-06-07
Payer: MEDICARE

## 2023-06-05 DIAGNOSIS — I71.21 ANEURYSM OF ASCENDING AORTA WITHOUT RUPTURE (HCC): ICD-10-CM

## 2023-06-05 LAB
CREAT SERPL-MCNC: 0.84 MG/DL (ref 0.5–0.9)
GFR SERPL CREATININE-BSD FRML MDRD: >60 ML/MIN/1.73M2

## 2023-06-05 PROCEDURE — 82565 ASSAY OF CREATININE: CPT

## 2023-06-05 PROCEDURE — 6360000004 HC RX CONTRAST MEDICATION: Performed by: NURSE PRACTITIONER

## 2023-06-05 PROCEDURE — 2580000003 HC RX 258: Performed by: NURSE PRACTITIONER

## 2023-06-05 PROCEDURE — 36415 COLL VENOUS BLD VENIPUNCTURE: CPT

## 2023-06-05 PROCEDURE — 71275 CT ANGIOGRAPHY CHEST: CPT

## 2023-06-05 RX ORDER — SODIUM CHLORIDE 0.9 % (FLUSH) 0.9 %
10 SYRINGE (ML) INJECTION PRN
Status: DISCONTINUED | OUTPATIENT
Start: 2023-06-05 | End: 2023-06-08 | Stop reason: HOSPADM

## 2023-06-05 RX ORDER — 0.9 % SODIUM CHLORIDE 0.9 %
80 INTRAVENOUS SOLUTION INTRAVENOUS ONCE
Status: COMPLETED | OUTPATIENT
Start: 2023-06-05 | End: 2023-06-05

## 2023-06-05 RX ADMIN — SODIUM CHLORIDE, PRESERVATIVE FREE 10 ML: 5 INJECTION INTRAVENOUS at 12:08

## 2023-06-05 RX ADMIN — SODIUM CHLORIDE 80 ML: 9 INJECTION, SOLUTION INTRAVENOUS at 12:08

## 2023-06-05 RX ADMIN — IOPAMIDOL 100 ML: 755 INJECTION, SOLUTION INTRAVENOUS at 12:08

## 2023-06-07 ENCOUNTER — INITIAL CONSULT (OUTPATIENT)
Dept: CARDIOTHORACIC SURGERY | Age: 63
End: 2023-06-07
Payer: MEDICARE

## 2023-06-07 VITALS
DIASTOLIC BLOOD PRESSURE: 78 MMHG | HEART RATE: 59 BPM | SYSTOLIC BLOOD PRESSURE: 115 MMHG | BODY MASS INDEX: 30.57 KG/M2 | OXYGEN SATURATION: 100 % | TEMPERATURE: 97.7 F | WEIGHT: 172.6 LBS

## 2023-06-07 DIAGNOSIS — I71.21 ANEURYSM OF ASCENDING AORTA WITHOUT RUPTURE (HCC): Primary | ICD-10-CM

## 2023-06-07 PROCEDURE — 1036F TOBACCO NON-USER: CPT | Performed by: NURSE PRACTITIONER

## 2023-06-07 PROCEDURE — 99203 OFFICE O/P NEW LOW 30 MIN: CPT | Performed by: NURSE PRACTITIONER

## 2023-06-07 PROCEDURE — 3017F COLORECTAL CA SCREEN DOC REV: CPT | Performed by: NURSE PRACTITIONER

## 2023-06-07 PROCEDURE — 3074F SYST BP LT 130 MM HG: CPT | Performed by: NURSE PRACTITIONER

## 2023-06-07 PROCEDURE — G8427 DOCREV CUR MEDS BY ELIG CLIN: HCPCS | Performed by: NURSE PRACTITIONER

## 2023-06-07 PROCEDURE — 3078F DIAST BP <80 MM HG: CPT | Performed by: NURSE PRACTITIONER

## 2023-06-07 PROCEDURE — G8417 CALC BMI ABV UP PARAM F/U: HCPCS | Performed by: NURSE PRACTITIONER

## 2023-06-07 NOTE — PROGRESS NOTES
primary osteoarthritis, left knee    Anxiety    Abnormal ECG    Dilated aortic root (HCC)    Heart palpitations    Right bundle branch block    SOB (shortness of breath) on exertion       Risks Reviewed w/pt  No plans for surgery     PLAN:  Ascending aorta is 4 to 4.1 cm. No sign of dissection. Reviewed echocardiogram which is normal.  We will have patient follow-up in 1 year with a repeat CT chest to evaluate the ascending aorta. Patient agrees with plan.     Stephanie Gallo, APRN - NP, CNP  Phone: [phone]

## 2023-07-10 ENCOUNTER — OFFICE VISIT (OUTPATIENT)
Dept: FAMILY MEDICINE CLINIC | Age: 63
End: 2023-07-10
Payer: MEDICARE

## 2023-07-10 VITALS
BODY MASS INDEX: 29.9 KG/M2 | TEMPERATURE: 97.3 F | OXYGEN SATURATION: 97 % | HEART RATE: 67 BPM | SYSTOLIC BLOOD PRESSURE: 102 MMHG | RESPIRATION RATE: 16 BRPM | WEIGHT: 168.8 LBS | DIASTOLIC BLOOD PRESSURE: 80 MMHG

## 2023-07-10 DIAGNOSIS — F41.9 ANXIETY: ICD-10-CM

## 2023-07-10 DIAGNOSIS — F34.1 PERSISTENT DEPRESSIVE DISORDER: ICD-10-CM

## 2023-07-10 DIAGNOSIS — G47.33 OSA (OBSTRUCTIVE SLEEP APNEA): ICD-10-CM

## 2023-07-10 DIAGNOSIS — I10 ESSENTIAL HYPERTENSION: ICD-10-CM

## 2023-07-10 DIAGNOSIS — N18.30 CONTROLLED TYPE 2 DIABETES MELLITUS WITH STAGE 3 CHRONIC KIDNEY DISEASE, WITHOUT LONG-TERM CURRENT USE OF INSULIN (HCC): Primary | ICD-10-CM

## 2023-07-10 DIAGNOSIS — E11.22 CONTROLLED TYPE 2 DIABETES MELLITUS WITH STAGE 3 CHRONIC KIDNEY DISEASE, WITHOUT LONG-TERM CURRENT USE OF INSULIN (HCC): Primary | ICD-10-CM

## 2023-07-10 LAB — HBA1C MFR BLD: 6.1 %

## 2023-07-10 PROCEDURE — 83037 HB GLYCOSYLATED A1C HOME DEV: CPT | Performed by: NURSE PRACTITIONER

## 2023-07-10 PROCEDURE — 3074F SYST BP LT 130 MM HG: CPT | Performed by: NURSE PRACTITIONER

## 2023-07-10 PROCEDURE — G8400 PT W/DXA NO RESULTS DOC: HCPCS | Performed by: NURSE PRACTITIONER

## 2023-07-10 PROCEDURE — 3078F DIAST BP <80 MM HG: CPT | Performed by: NURSE PRACTITIONER

## 2023-07-10 PROCEDURE — G8417 CALC BMI ABV UP PARAM F/U: HCPCS | Performed by: NURSE PRACTITIONER

## 2023-07-10 PROCEDURE — G8427 DOCREV CUR MEDS BY ELIG CLIN: HCPCS | Performed by: NURSE PRACTITIONER

## 2023-07-10 PROCEDURE — 1123F ACP DISCUSS/DSCN MKR DOCD: CPT | Performed by: NURSE PRACTITIONER

## 2023-07-10 PROCEDURE — 2022F DILAT RTA XM EVC RTNOPTHY: CPT | Performed by: NURSE PRACTITIONER

## 2023-07-10 PROCEDURE — 1090F PRES/ABSN URINE INCON ASSESS: CPT | Performed by: NURSE PRACTITIONER

## 2023-07-10 PROCEDURE — 1036F TOBACCO NON-USER: CPT | Performed by: NURSE PRACTITIONER

## 2023-07-10 PROCEDURE — 99214 OFFICE O/P EST MOD 30 MIN: CPT | Performed by: NURSE PRACTITIONER

## 2023-07-10 PROCEDURE — 3017F COLORECTAL CA SCREEN DOC REV: CPT | Performed by: NURSE PRACTITIONER

## 2023-07-10 PROCEDURE — 3044F HG A1C LEVEL LT 7.0%: CPT | Performed by: NURSE PRACTITIONER

## 2023-07-10 RX ORDER — METFORMIN HYDROCHLORIDE 500 MG/1
TABLET, EXTENDED RELEASE ORAL
Qty: 90 TABLET | Refills: 5 | Status: CANCELLED | OUTPATIENT
Start: 2023-07-10

## 2023-07-10 RX ORDER — AMLODIPINE BESYLATE 5 MG/1
5 TABLET ORAL NIGHTLY
Qty: 30 TABLET | Refills: 2 | Status: SHIPPED | OUTPATIENT
Start: 2023-07-10 | End: 2023-07-23 | Stop reason: SDUPTHER

## 2023-07-10 ASSESSMENT — ENCOUNTER SYMPTOMS
BACK PAIN: 1
ABDOMINAL PAIN: 0
COUGH: 0
DIARRHEA: 0
CHEST TIGHTNESS: 0
NAUSEA: 0
SHORTNESS OF BREATH: 0
CONSTIPATION: 0
ROS SKIN COMMENTS: FOLLOWS WITH DERMATOLOGY
WHEEZING: 0
ABDOMINAL DISTENTION: 0

## 2023-07-13 DIAGNOSIS — E11.22 CONTROLLED TYPE 2 DIABETES MELLITUS WITH STAGE 3 CHRONIC KIDNEY DISEASE, WITHOUT LONG-TERM CURRENT USE OF INSULIN (HCC): ICD-10-CM

## 2023-07-13 DIAGNOSIS — N18.30 CONTROLLED TYPE 2 DIABETES MELLITUS WITH STAGE 3 CHRONIC KIDNEY DISEASE, WITHOUT LONG-TERM CURRENT USE OF INSULIN (HCC): ICD-10-CM

## 2023-07-13 DIAGNOSIS — K21.9 GASTROESOPHAGEAL REFLUX DISEASE, UNSPECIFIED WHETHER ESOPHAGITIS PRESENT: ICD-10-CM

## 2023-07-13 DIAGNOSIS — E78.00 PURE HYPERCHOLESTEROLEMIA: ICD-10-CM

## 2023-07-13 RX ORDER — METFORMIN HYDROCHLORIDE 500 MG/1
TABLET, EXTENDED RELEASE ORAL
Qty: 270 TABLET | Refills: 1 | Status: SHIPPED | OUTPATIENT
Start: 2023-07-13 | End: 2024-07-13

## 2023-07-13 RX ORDER — OMEPRAZOLE 20 MG/1
20 CAPSULE, DELAYED RELEASE ORAL DAILY
Qty: 90 CAPSULE | Refills: 1 | Status: SHIPPED | OUTPATIENT
Start: 2023-07-13 | End: 2024-01-09

## 2023-07-13 RX ORDER — ATORVASTATIN CALCIUM 80 MG/1
80 TABLET, FILM COATED ORAL DAILY
Qty: 90 TABLET | Refills: 1 | Status: SHIPPED | OUTPATIENT
Start: 2023-07-13 | End: 2024-01-09

## 2023-07-13 NOTE — TELEPHONE ENCOUNTER
Patient requesting 90 day supply. LOV 7/10/23  RTO F/U scheduled  LRF 2/16/23 and 4/7/23    Health Maintenance   Topic Date Due    HIV screen  Never done    Diabetic Alb to Cr ratio (uACR) test  04/23/2020    COVID-19 Vaccine (2 - Booster for [place]arez series) 05/05/2021    Diabetic retinal exam  05/26/2022    Colorectal Cancer Screen  02/27/2023    Annual Wellness Visit (AWV)  Never done    Shingles vaccine (2 of 2) 06/28/2023    Flu vaccine (1) 08/01/2023    Diabetic foot exam  02/16/2024    Depression Monitoring  02/16/2024    Lipids  03/14/2024    GFR test (Diabetes, CKD 3-4, OR last GFR 15-59)  06/05/2024    A1C test (Diabetic or Prediabetic)  07/10/2024    Breast cancer screen  05/03/2025    DTaP/Tdap/Td vaccine (3 - Td or Tdap) 09/13/2028    DEXA (modify frequency per FRAX score)  Completed    Pneumococcal 65+ years Vaccine  Completed    Hepatitis C screen  Completed    Hepatitis A vaccine  Aged Out    Hib vaccine  Aged Out    Meningococcal (ACWY) vaccine  Aged Out    Pneumococcal 0-64 years Vaccine  Discontinued             (applicable per patient's age: Cancer Screenings, Depression Screening, Fall Risk Screening, Immunizations)    Hemoglobin A1C (%)   Date Value   07/10/2023 6.1 (A)   02/16/2023 6.4 (H)   03/24/2022 6.2 (H)     Microalb/Crt.  Ratio (mcg/mg creat)   Date Value   04/23/2019 5     LDL Cholesterol (mg/dL)   Date Value   03/14/2023 89     AST (U/L)   Date Value   03/14/2023 26     ALT (U/L)   Date Value   03/14/2023 33     BUN (mg/dL)   Date Value   03/14/2023 16      (goal A1C is < 7)   (goal LDL is <100) need 30-50% reduction from baseline     BP Readings from Last 3 Encounters:   07/10/23 102/80   06/07/23 115/78   04/07/23 118/80    (goal /80)      All Future Testing planned in [facility]:  Lab Frequency Next Occurrence   CTA CHEST W CONTRAST Once 06/07/2024       Next Visit Date:  Future Appointments   Date Time Provider [address]   11/10/2023  9:30 AM JORDAN Cho -

## 2023-07-20 DIAGNOSIS — I10 ESSENTIAL HYPERTENSION: ICD-10-CM

## 2023-07-20 NOTE — TELEPHONE ENCOUNTER
Pharmacy requesting 90 day supply. LOV 7/10/23  RTO 4 months; F/U scheduled  [facility] 7/10/23    Health Maintenance   Topic Date Due    HIV screen  Never done    Diabetic Alb to Cr ratio (uACR) test  04/23/2020    COVID-19 Vaccine (2 - Booster for [place]arez series) 05/05/2021    Diabetic retinal exam  05/26/2022    Colorectal Cancer Screen  02/27/2023    Annual Wellness Visit (AWV)  Never done    Shingles vaccine (2 of 2) 06/28/2023    Flu vaccine (1) 08/01/2023    Diabetic foot exam  02/16/2024    Depression Monitoring  02/16/2024    Lipids  03/14/2024    GFR test (Diabetes, CKD 3-4, OR last GFR 15-59)  06/05/2024    A1C test (Diabetic or Prediabetic)  07/10/2024    Breast cancer screen  05/03/2025    DTaP/Tdap/Td vaccine (3 - Td or Tdap) 09/13/2028    DEXA (modify frequency per FRAX score)  Completed    Pneumococcal 65+ years Vaccine  Completed    Hepatitis C screen  Completed    Hepatitis A vaccine  Aged Out    Hib vaccine  Aged Out    Meningococcal (ACWY) vaccine  Aged Out    Pneumococcal 0-64 years Vaccine  Discontinued             (applicable per patient's age: Cancer Screenings, Depression Screening, Fall Risk Screening, Immunizations)    Hemoglobin A1C (%)   Date Value   07/10/2023 6.1 (A)   02/16/2023 6.4 (H)   03/24/2022 6.2 (H)     Microalb/Crt.  Ratio (mcg/mg creat)   Date Value   04/23/2019 5     LDL Cholesterol (mg/dL)   Date Value   03/14/2023 89     AST (U/L)   Date Value   03/14/2023 26     ALT (U/L)   Date Value   03/14/2023 33     BUN (mg/dL)   Date Value   03/14/2023 16      (goal A1C is < 7)   (goal LDL is <100) need 30-50% reduction from baseline     BP Readings from Last 3 Encounters:   07/10/23 102/80   06/07/23 115/78   04/07/23 118/80    (goal /80)      All Future Testing planned in [facility]:  Lab Frequency Next Occurrence   CTA CHEST W CONTRAST Once 06/07/2024       Next Visit Date:  Future Appointments   Date Time Provider [address]   11/10/2023  9:30 AM JORDAN Gutierrez -

## 2023-07-23 RX ORDER — AMLODIPINE BESYLATE 5 MG/1
5 TABLET ORAL NIGHTLY
Qty: 90 TABLET | Refills: 1 | Status: SHIPPED | OUTPATIENT
Start: 2023-07-23 | End: 2024-01-19

## 2023-08-29 DIAGNOSIS — I10 ESSENTIAL HYPERTENSION: ICD-10-CM

## 2023-08-29 DIAGNOSIS — I51.89 DIASTOLIC DYSFUNCTION: ICD-10-CM

## 2023-08-29 NOTE — TELEPHONE ENCOUNTER
Pharmacy requesting 90 day supply.     LOV 7/10/23  RTO 4 months; F/U scheduled  LRF 5/9/23    Health Maintenance   Topic Date Due    HIV screen  Never done    Diabetic Alb to Cr ratio (uACR) test  04/23/2020    COVID-19 Vaccine (2 - Booster for Juarez series) 05/05/2021    Diabetic retinal exam  05/26/2022    Colorectal Cancer Screen  02/27/2023    Annual Wellness Visit (AWV)  Never done    Shingles vaccine (2 of 2) 06/28/2023    Flu vaccine (1) 08/01/2023    Diabetic foot exam  02/16/2024    Depression Monitoring  02/16/2024    Lipids  03/14/2024    GFR test (Diabetes, CKD 3-4, OR last GFR 15-59)  06/05/2024    A1C test (Diabetic or Prediabetic)  07/10/2024    Breast cancer screen  05/03/2025    DTaP/Tdap/Td vaccine (3 - Td or Tdap) 09/13/2028    DEXA (modify frequency per FRAX score)  Completed    Pneumococcal 65+ years Vaccine  Completed    Hepatitis C screen  Completed    Hepatitis A vaccine  Aged Out    Hib vaccine  Aged Out    Meningococcal (ACWY) vaccine  Aged Out    Pneumococcal 0-64 years Vaccine  Discontinued             (applicable per patient's age: Cancer Screenings, Depression Screening, Fall Risk Screening, Immunizations)    Hemoglobin A1C (%)   Date Value   07/10/2023 6.1 (A)   02/16/2023 6.4 (H)   03/24/2022 6.2 (H)     LDL Cholesterol (mg/dL)   Date Value   03/14/2023 89     AST (U/L)   Date Value   03/14/2023 26     ALT (U/L)   Date Value   03/14/2023 33     BUN (mg/dL)   Date Value   03/14/2023 16      (goal A1C is < 7)   (goal LDL is <100) need 30-50% reduction from baseline     BP Readings from Last 3 Encounters:   07/10/23 102/80   06/07/23 115/78   04/07/23 118/80    (goal /80)      All Future Testing planned in [facility]:  Lab Frequency Next Occurrence   CTA CHEST W CONTRAST Once 06/07/2024       Next Visit Date:  Future Appointments   Date Time Provider [address]   11/10/2023  9:30 AM Pham Chaudhari, APRN - CNP [facility] [facility]   11/10/2023 10:00 AM JORDAN Neal -

## 2023-08-30 RX ORDER — CARVEDILOL 6.25 MG/1
6.25 TABLET ORAL 2 TIMES DAILY WITH MEALS
Qty: 180 TABLET | Refills: 1 | Status: SHIPPED | OUTPATIENT
Start: 2023-08-30 | End: 2024-02-26

## 2023-09-07 DIAGNOSIS — F33.1 MODERATE EPISODE OF RECURRENT MAJOR DEPRESSIVE DISORDER (HCC): ICD-10-CM

## 2023-09-07 DIAGNOSIS — F51.04 PSYCHOPHYSIOLOGICAL INSOMNIA: ICD-10-CM

## 2023-09-08 NOTE — TELEPHONE ENCOUNTER
LOV 07/10/2023   RTO 11/10/2023  LRF 03/27/2023          Controlled Substance Monitoring:    Acute and Chronic Pain Monitoring:   RX Monitoring 8/7/2018   Attestation The Prescription Monitoring Report for this patient was reviewed today. Periodic Controlled Substance Monitoring No signs of potential drug abuse or diversion identified.

## 2023-09-10 RX ORDER — TRAZODONE HYDROCHLORIDE 100 MG/1
TABLET ORAL
Qty: 30 TABLET | Refills: 2 | Status: SHIPPED | OUTPATIENT
Start: 2023-09-10 | End: 2023-11-10

## 2023-11-10 ENCOUNTER — OFFICE VISIT (OUTPATIENT)
Dept: FAMILY MEDICINE CLINIC | Age: 63
End: 2023-11-10

## 2023-11-10 VITALS
OXYGEN SATURATION: 97 % | HEART RATE: 112 BPM | TEMPERATURE: 96.9 F | WEIGHT: 168.6 LBS | DIASTOLIC BLOOD PRESSURE: 80 MMHG | HEIGHT: 62 IN | SYSTOLIC BLOOD PRESSURE: 102 MMHG | BODY MASS INDEX: 31.03 KG/M2 | RESPIRATION RATE: 18 BRPM

## 2023-11-10 DIAGNOSIS — F41.9 ANXIETY: ICD-10-CM

## 2023-11-10 DIAGNOSIS — Z00.00 WELCOME TO MEDICARE PREVENTIVE VISIT: Primary | ICD-10-CM

## 2023-11-10 DIAGNOSIS — F33.1 MODERATE EPISODE OF RECURRENT MAJOR DEPRESSIVE DISORDER (HCC): ICD-10-CM

## 2023-11-10 DIAGNOSIS — E11.22 CONTROLLED TYPE 2 DIABETES MELLITUS WITH STAGE 3 CHRONIC KIDNEY DISEASE, WITHOUT LONG-TERM CURRENT USE OF INSULIN (HCC): ICD-10-CM

## 2023-11-10 DIAGNOSIS — M51.16 LUMBAR DISC HERNIATION WITH RADICULOPATHY: ICD-10-CM

## 2023-11-10 DIAGNOSIS — Z00.00 ENCOUNTER FOR SUBSEQUENT ANNUAL WELLNESS VISIT (AWV) IN MEDICARE PATIENT: Primary | ICD-10-CM

## 2023-11-10 DIAGNOSIS — E87.6 HYPOKALEMIA: ICD-10-CM

## 2023-11-10 DIAGNOSIS — R73.03 PREDIABETES: ICD-10-CM

## 2023-11-10 DIAGNOSIS — I10 ESSENTIAL HYPERTENSION: ICD-10-CM

## 2023-11-10 DIAGNOSIS — N18.30 CONTROLLED TYPE 2 DIABETES MELLITUS WITH STAGE 3 CHRONIC KIDNEY DISEASE, WITHOUT LONG-TERM CURRENT USE OF INSULIN (HCC): ICD-10-CM

## 2023-11-10 DIAGNOSIS — G62.9 PERIPHERAL POLYNEUROPATHY: ICD-10-CM

## 2023-11-10 DIAGNOSIS — G47.33 OSA (OBSTRUCTIVE SLEEP APNEA): ICD-10-CM

## 2023-11-10 RX ORDER — LISINOPRIL 40 MG/1
20 TABLET ORAL DAILY
Qty: 30 TABLET | Refills: 5 | Status: SHIPPED
Start: 2023-11-10 | End: 2024-11-09

## 2023-11-10 RX ORDER — DEXAMETHASONE 1 MG
1 TABLET ORAL ONCE
Qty: 1 TABLET | Refills: 0 | Status: SHIPPED | OUTPATIENT
Start: 2023-11-10 | End: 2023-11-10

## 2023-11-10 ASSESSMENT — PATIENT HEALTH QUESTIONNAIRE - PHQ9
6. FEELING BAD ABOUT YOURSELF - OR THAT YOU ARE A FAILURE OR HAVE LET YOURSELF OR YOUR FAMILY DOWN: 2
SUM OF ALL RESPONSES TO PHQ QUESTIONS 1-9: 14
1. LITTLE INTEREST OR PLEASURE IN DOING THINGS: 2
9. THOUGHTS THAT YOU WOULD BE BETTER OFF DEAD, OR OF HURTING YOURSELF: 1
5. POOR APPETITE OR OVEREATING: 1
3. TROUBLE FALLING OR STAYING ASLEEP: 0
SUM OF ALL RESPONSES TO PHQ QUESTIONS 1-9: 14
4. FEELING TIRED OR HAVING LITTLE ENERGY: 3
SUM OF ALL RESPONSES TO PHQ QUESTIONS 1-9: 13
8. MOVING OR SPEAKING SO SLOWLY THAT OTHER PEOPLE COULD HAVE NOTICED. OR THE OPPOSITE, BEING SO FIGETY OR RESTLESS THAT YOU HAVE BEEN MOVING AROUND A LOT MORE THAN USUAL: 0
SUM OF ALL RESPONSES TO PHQ QUESTIONS 1-9: 14
10. IF YOU CHECKED OFF ANY PROBLEMS, HOW DIFFICULT HAVE THESE PROBLEMS MADE IT FOR YOU TO DO YOUR WORK, TAKE CARE OF THINGS AT HOME, OR GET ALONG WITH OTHER PEOPLE: 2
2. FEELING DOWN, DEPRESSED OR HOPELESS: 2
7. TROUBLE CONCENTRATING ON THINGS, SUCH AS READING THE NEWSPAPER OR WATCHING TELEVISION: 3
SUM OF ALL RESPONSES TO PHQ9 QUESTIONS 1 & 2: 4

## 2023-11-10 ASSESSMENT — COLUMBIA-SUICIDE SEVERITY RATING SCALE - C-SSRS
2. HAVE YOU ACTUALLY HAD ANY THOUGHTS OF KILLING YOURSELF?: NO
1. WITHIN THE PAST MONTH, HAVE YOU WISHED YOU WERE DEAD OR WISHED YOU COULD GO TO SLEEP AND NOT WAKE UP?: NO
6. HAVE YOU EVER DONE ANYTHING, STARTED TO DO ANYTHING, OR PREPARED TO DO ANYTHING TO END YOUR LIFE?: NO

## 2023-11-10 ASSESSMENT — ENCOUNTER SYMPTOMS
NAUSEA: 0
COUGH: 0
SHORTNESS OF BREATH: 0
ABDOMINAL DISTENTION: 0
DIARRHEA: 0
CHEST TIGHTNESS: 0
WHEEZING: 0
BACK PAIN: 1
ROS SKIN COMMENTS: FOLLOWS WITH DERMATOLOGY
CONSTIPATION: 0
ABDOMINAL PAIN: 0

## 2023-11-10 ASSESSMENT — LIFESTYLE VARIABLES
HOW OFTEN DO YOU HAVE A DRINK CONTAINING ALCOHOL: 2-4 TIMES A MONTH
HOW MANY STANDARD DRINKS CONTAINING ALCOHOL DO YOU HAVE ON A TYPICAL DAY: 1 OR 2

## 2023-11-10 NOTE — PROGRESS NOTES
[address] [place]   [address]  [phone]    11/10/23     Patient ID  Suki De is a 70 y.o. female  Established patient    Chief Complaint  Suki De presents today for Medicare AWV      ASSESSMENT/PLAN  1. Welcome to Medicare preventive visit  2. Lumbar disc herniation with radiculopathy  -     XR THORACIC SPINE (2 VIEWS); Future  -     XR LUMBAR SPINE (2-3 VIEWS); Future  -     XR HIP LEFT (2-3 VIEWS); Future  3. Peripheral polyneuropathy  -     XR THORACIC SPINE (2 VIEWS); Future  -     XR LUMBAR SPINE (2-3 VIEWS); Future  -     XR HIP LEFT (2-3 VIEWS); Future  4. Essential hypertension  -     lisinopril (PRINIVIL;ZESTRIL) 40 MG tablet; Take 0.5 tablets by mouth daily, Disp-30 tablet, R-5Adjust Sig  5. ARNIE (obstructive sleep apnea)  6. Controlled type 2 diabetes mellitus with stage 3 chronic kidney disease, without long-term current use of insulin ([address])  7. Moderate episode of recurrent major depressive disorder (HCC)  -     dexamethasone (DECADRON) 1 MG tablet; Take 1 tablet by mouth once for 1 dose Take between 11 pm and 12 midnight. Complete cortisol lab next morning between 7 am and 9 am, Disp-1 tablet, R-0Normal  -     Cortisol Total; Future  -     Dexamethasone; Future  8. Anxiety  -     dexamethasone (DECADRON) 1 MG tablet; Take 1 tablet by mouth once for 1 dose Take between 11 pm and 12 midnight. Complete cortisol lab next morning between 7 am and 9 am, Disp-1 tablet, R-0Normal  -     Cortisol Total; Future  -     Dexamethasone; Future  9. Prediabetes  -     dexamethasone (DECADRON) 1 MG tablet; Take 1 tablet by mouth once for 1 dose Take between 11 pm and 12 midnight. Complete cortisol lab next morning between 7 am and 9 am, Disp-1 tablet, R-0Normal  -     Cortisol Total; Future  -     Dexamethasone; Future  10. Hypokalemia  -     Potassium; Future       Decrease lisinopril 20 mg   CT chest nodue summer 24 - with aorta?  Imaging ordered  Check labs

## 2023-11-10 NOTE — PROGRESS NOTES
Medicare Annual Wellness Visit    Familia Burgess is here for Medicare AWV    Assessment & Plan   Welcome to Medicare preventive visit  Lumbar disc herniation with radiculopathy  -     XR THORACIC SPINE (2 VIEWS); Future  -     XR LUMBAR SPINE (2-3 VIEWS); Future  -     XR HIP LEFT (2-3 VIEWS); Future  Peripheral polyneuropathy  -     XR THORACIC SPINE (2 VIEWS); Future  -     XR LUMBAR SPINE (2-3 VIEWS); Future  -     XR HIP LEFT (2-3 VIEWS); Future  Essential hypertension  -     lisinopril (PRINIVIL;ZESTRIL) 40 MG tablet; Take 0.5 tablets by mouth daily, Disp-30 tablet, R-5Adjust Sig  ARNIE (obstructive sleep apnea)  Controlled type 2 diabetes mellitus with stage 3 chronic kidney disease, without long-term current use of insulin (HCC)  Moderate episode of recurrent major depressive disorder (HCC)  -     dexamethasone (DECADRON) 1 MG tablet; Take 1 tablet by mouth once for 1 dose Take between 11 pm and 12 midnight. Complete cortisol lab next morning between 7 am and 9 am, Disp-1 tablet, R-0Normal  -     Cortisol Total; Future  -     Dexamethasone; Future  Anxiety  -     dexamethasone (DECADRON) 1 MG tablet; Take 1 tablet by mouth once for 1 dose Take between 11 pm and 12 midnight. Complete cortisol lab next morning between 7 am and 9 am, Disp-1 tablet, R-0Normal  -     Cortisol Total; Future  -     Dexamethasone; Future  Prediabetes  -     dexamethasone (DECADRON) 1 MG tablet; Take 1 tablet by mouth once for 1 dose Take between 11 pm and 12 midnight. Complete cortisol lab next morning between 7 am and 9 am, Disp-1 tablet, R-0Normal  -     Cortisol Total; Future  -     Dexamethasone; Future  Hypokalemia  -     Potassium; Future  Recommendations for Preventive Services Due: see orders and patient instructions/AVS.  Recommended screening schedule for the next 5-10 years is provided to the patient in written form: see Patient Instructions/AVS.     Return in about 3 months (around 2/10/2024).      Subjective   The

## 2023-11-13 ENCOUNTER — HOSPITAL ENCOUNTER (OUTPATIENT)
Facility: CLINIC | Age: 63
Discharge: HOME | End: 2023-11-15
Payer: MEDICARE

## 2023-11-13 ENCOUNTER — HOSPITAL ENCOUNTER (OUTPATIENT)
Age: 63
Setting detail: SPECIMEN
Discharge: HOME | End: 2023-11-13

## 2023-11-13 ENCOUNTER — HOSPITAL ENCOUNTER (OUTPATIENT)
Dept: GENERAL RADIOLOGY | Facility: CLINIC | Age: 63
Discharge: HOME | End: 2023-11-15
Payer: MEDICARE

## 2023-11-13 DIAGNOSIS — M51.16 LUMBAR DISC HERNIATION WITH RADICULOPATHY: ICD-10-CM

## 2023-11-13 DIAGNOSIS — E87.6 HYPOKALEMIA: ICD-10-CM

## 2023-11-13 DIAGNOSIS — F33.1 MODERATE EPISODE OF RECURRENT MAJOR DEPRESSIVE DISORDER (HCC): ICD-10-CM

## 2023-11-13 DIAGNOSIS — G62.9 PERIPHERAL POLYNEUROPATHY: ICD-10-CM

## 2023-11-13 DIAGNOSIS — R73.03 PREDIABETES: ICD-10-CM

## 2023-11-13 DIAGNOSIS — F41.9 ANXIETY: ICD-10-CM

## 2023-11-13 LAB
CORTIS SERPL-MCNC: 1.6 UG/DL (ref 2.7–18.4)
CORTISOL COLLECTION INFO: ABNORMAL
POTASSIUM SERPL-SCNC: 4.2 MMOL/L (ref 3.7–5.3)

## 2023-11-13 PROCEDURE — 72070 X-RAY EXAM THORAC SPINE 2VWS: CPT

## 2023-11-13 PROCEDURE — 72100 X-RAY EXAM L-S SPINE 2/3 VWS: CPT

## 2023-11-13 PROCEDURE — 73502 X-RAY EXAM HIP UNI 2-3 VIEWS: CPT

## 2023-11-17 DIAGNOSIS — F33.1 MODERATE EPISODE OF RECURRENT MAJOR DEPRESSIVE DISORDER (HCC): ICD-10-CM

## 2023-11-17 DIAGNOSIS — M54.42 CHRONIC MIDLINE LOW BACK PAIN WITH LEFT-SIDED SCIATICA: ICD-10-CM

## 2023-11-17 DIAGNOSIS — G89.29 CHRONIC MIDLINE LOW BACK PAIN WITH LEFT-SIDED SCIATICA: ICD-10-CM

## 2023-11-17 DIAGNOSIS — G62.9 PERIPHERAL POLYNEUROPATHY: Primary | ICD-10-CM

## 2023-11-17 RX ORDER — DULOXETIN HYDROCHLORIDE 60 MG/1
CAPSULE, DELAYED RELEASE ORAL
Qty: 60 CAPSULE | Refills: 5 | Status: SHIPPED | OUTPATIENT
Start: 2023-11-17

## 2023-11-17 NOTE — TELEPHONE ENCOUNTER
LOV 11/10/2023   RTO 02/12/2024  LRF 04/07/2023          Controlled Substance Monitoring:    Acute and Chronic Pain Monitoring:   RX Monitoring Attestation Periodic Controlled Substance Monitoring   8/7/2018   5:01 PM The Prescription Monitoring Report for this patient was reviewed today. No signs of potential drug abuse or diversion identified.

## 2023-11-19 LAB — DEXAMETHASONE: 801.1 NG/DL

## 2023-11-26 DIAGNOSIS — E11.22 CONTROLLED TYPE 2 DIABETES MELLITUS WITH STAGE 3 CHRONIC KIDNEY DISEASE, WITHOUT LONG-TERM CURRENT USE OF INSULIN (HCC): Primary | ICD-10-CM

## 2023-11-26 DIAGNOSIS — N18.30 CONTROLLED TYPE 2 DIABETES MELLITUS WITH STAGE 3 CHRONIC KIDNEY DISEASE, WITHOUT LONG-TERM CURRENT USE OF INSULIN (HCC): Primary | ICD-10-CM

## 2023-11-26 RX ORDER — DEXAMETHASONE 0.5 MG/1
0.5 TABLET ORAL ONCE
Qty: 1 TABLET | Refills: 0 | Status: SHIPPED | OUTPATIENT
Start: 2023-11-26 | End: 2023-11-26

## 2023-11-28 ENCOUNTER — HOSPITAL ENCOUNTER (OUTPATIENT)
Age: 63
Setting detail: SPECIMEN
Discharge: HOME | End: 2023-11-28

## 2023-11-28 DIAGNOSIS — E11.22 CONTROLLED TYPE 2 DIABETES MELLITUS WITH STAGE 3 CHRONIC KIDNEY DISEASE, WITHOUT LONG-TERM CURRENT USE OF INSULIN (HCC): ICD-10-CM

## 2023-11-28 DIAGNOSIS — N18.30 CONTROLLED TYPE 2 DIABETES MELLITUS WITH STAGE 3 CHRONIC KIDNEY DISEASE, WITHOUT LONG-TERM CURRENT USE OF INSULIN (HCC): ICD-10-CM

## 2023-11-28 LAB
CORTIS SERPL-MCNC: 1.6 UG/DL (ref 2.5–19.5)
CORTISOL COLLECTION INFO: ABNORMAL

## 2023-12-03 LAB — DEXAMETHASONE: 325.3 NG/DL

## 2023-12-05 DIAGNOSIS — R94.7 ABNORMAL DEXAMETHASONE SUPPRESSION TEST: Primary | ICD-10-CM

## 2023-12-08 ENCOUNTER — HOSPITAL ENCOUNTER (OUTPATIENT)
Dept: MRI IMAGING | Facility: CLINIC | Age: 63
End: 2023-12-08
Payer: MEDICARE

## 2023-12-08 ENCOUNTER — HOSPITAL ENCOUNTER (OUTPATIENT)
Facility: CLINIC | Age: 63
Discharge: HOME | End: 2023-12-08
Payer: MEDICARE

## 2023-12-08 DIAGNOSIS — R94.7 ABNORMAL DEXAMETHASONE SUPPRESSION TEST: ICD-10-CM

## 2023-12-08 DIAGNOSIS — M54.42 CHRONIC MIDLINE LOW BACK PAIN WITH LEFT-SIDED SCIATICA: ICD-10-CM

## 2023-12-08 DIAGNOSIS — G89.29 CHRONIC MIDLINE LOW BACK PAIN WITH LEFT-SIDED SCIATICA: ICD-10-CM

## 2023-12-08 PROCEDURE — 72146 MRI CHEST SPINE W/O DYE: CPT

## 2023-12-08 PROCEDURE — 82627 DEHYDROEPIANDROSTERONE: CPT

## 2023-12-08 PROCEDURE — 36415 COLL VENOUS BLD VENIPUNCTURE: CPT

## 2023-12-08 PROCEDURE — 72148 MRI LUMBAR SPINE W/O DYE: CPT

## 2023-12-08 PROCEDURE — 82024 ASSAY OF ACTH: CPT

## 2023-12-09 DIAGNOSIS — F33.1 MODERATE EPISODE OF RECURRENT MAJOR DEPRESSIVE DISORDER (HCC): ICD-10-CM

## 2023-12-09 DIAGNOSIS — F51.04 PSYCHOPHYSIOLOGICAL INSOMNIA: ICD-10-CM

## 2023-12-11 RX ORDER — TRAZODONE HYDROCHLORIDE 100 MG/1
100 TABLET ORAL NIGHTLY
Qty: 90 TABLET | Refills: 1 | Status: SHIPPED | OUTPATIENT
Start: 2023-12-11 | End: 2024-12-10

## 2023-12-11 NOTE — TELEPHONE ENCOUNTER
LOV 11/10/23  RTO 3 months; F/U scheduled  LRF 9/10/23    Health Maintenance   Topic Date Due    HIV screen  Never done    Pneumococcal 65+ years Vaccine (2 - PCV) 08/24/2010    Hepatitis B vaccine (1 of 3 - Risk 3-dose series) Never done    Respiratory Syncytial Virus (RSV) age 61 yrs+ (1 - 1-dose 60+ series) Never done    Diabetic Alb to Cr ratio (uACR) test  04/23/2020    Diabetic retinal exam  05/26/2022    Colorectal Cancer Screen  02/27/2023    Flu vaccine (1) 08/01/2023    COVID-19 Vaccine (2 - 2023-24 season) 09/01/2023    Diabetic foot exam  02/16/2024    Lipids  03/14/2024    GFR test (Diabetes, CKD 3-4, OR last GFR 15-59)  06/05/2024    A1C test (Diabetic or Prediabetic)  07/10/2024    Depression Monitoring  11/10/2024    Annual Wellness Visit (AWV)  11/10/2024    Breast cancer screen  05/03/2025    DTaP/Tdap/Td vaccine (3 - Td or Tdap) 09/13/2028    DEXA (modify frequency per FRAX score)  Completed    Shingles vaccine  Completed    Hepatitis C screen  Completed    Hepatitis A vaccine  Aged Out    Hib vaccine  Aged Out    Meningococcal (ACWY) vaccine  Aged Out    Pneumococcal 0-64 years Vaccine  Discontinued             (applicable per patient's age: Cancer Screenings, Depression Screening, Fall Risk Screening, Immunizations)    Hemoglobin A1C (%)   Date Value   07/10/2023 6.1 (A)   02/16/2023 6.4 (H)   03/24/2022 6.2 (H)     LDL Cholesterol (mg/dL)   Date Value   03/14/2023 89     AST (U/L)   Date Value   03/14/2023 26     ALT (U/L)   Date Value   03/14/2023 33     BUN (mg/dL)   Date Value   03/14/2023 16      (goal A1C is < 7)   (goal LDL is <100) need 30-50% reduction from baseline     BP Readings from Last 3 Encounters:   11/10/23 102/80   07/10/23 102/80   06/07/23 115/78    (goal /80)      All Future Testing planned in [facility]:  Lab Frequency Next Occurrence   CTA CHEST W CONTRAST Once 06/07/2024       Next Visit Date:  Future Appointments   Date Time Provider [address]   2/12/2024

## 2023-12-12 LAB — ACTH PLAS-MCNC: 12 PG/ML (ref 6–58)

## 2023-12-14 LAB — DHEA-S SERPL-MCNC: 34 UG/DL (ref 9.4–246)

## 2023-12-26 DIAGNOSIS — M48.00 CENTRAL STENOSIS OF SPINAL CANAL: Primary | ICD-10-CM

## 2023-12-26 DIAGNOSIS — M48.04 FORAMINAL STENOSIS OF THORACIC REGION: ICD-10-CM

## 2023-12-26 DIAGNOSIS — M48.061 FORAMINAL STENOSIS OF LUMBAR REGION: ICD-10-CM

## 2024-01-15 ENCOUNTER — TELEPHONE (OUTPATIENT)
Dept: FAMILY MEDICINE CLINIC | Age: 64
End: 2024-01-15

## 2024-01-15 DIAGNOSIS — R94.7 ABNORMAL DEXAMETHASONE SUPPRESSION TEST: Primary | ICD-10-CM

## 2024-01-15 NOTE — TELEPHONE ENCOUNTER
Patient called in stating that she went to her neurosurgen for an appointment today 1/15 Dr. Sultana in [facility] and he advised her that she will need to complete surgery but prior to completing surgery he needs her to have her adrenal glands check prior. I asked patient since he is doing the surgery why he is not placing these orders and she was advised to call her PCP to place them.     OV with Kayy located in care everywhere- looks like he is not done with his note for me to pull information.

## 2024-01-22 ENCOUNTER — HOSPITAL ENCOUNTER (OUTPATIENT)
Dept: CT IMAGING | Age: 64
Discharge: HOME | End: 2024-01-24
Payer: MEDICARE

## 2024-01-22 DIAGNOSIS — R94.7 ABNORMAL DEXAMETHASONE SUPPRESSION TEST: ICD-10-CM

## 2024-01-22 PROCEDURE — 74150 CT ABDOMEN W/O CONTRAST: CPT

## 2024-01-30 ENCOUNTER — PATIENT MESSAGE (OUTPATIENT)
Dept: FAMILY MEDICINE CLINIC | Age: 64
End: 2024-01-30

## 2024-02-06 DIAGNOSIS — I51.89 DIASTOLIC DYSFUNCTION: ICD-10-CM

## 2024-02-06 DIAGNOSIS — I10 ESSENTIAL HYPERTENSION: ICD-10-CM

## 2024-02-07 RX ORDER — HYDROCHLOROTHIAZIDE 12.5 MG/1
12.5 TABLET ORAL DAILY
Qty: 30 TABLET | Refills: 5 | Status: SHIPPED | OUTPATIENT
Start: 2024-02-07 | End: 2024-08-05

## 2024-02-07 NOTE — TELEPHONE ENCOUNTER
LOV 11/10/23  RTO 3 months; F/U scheduled  LRF 5/9/23    Health Maintenance   Topic Date Due    HIV screen  Never done    Pneumococcal 65+ years Vaccine (2 - PCV) 08/24/2010    Respiratory Syncytial Virus (RSV) Pregnant or age 60 yrs+ (1 - 1-dose 60+ series) Never done    Diabetic Alb to Cr ratio (uACR) test  04/23/2020    Diabetic retinal exam  05/26/2022    Colorectal Cancer Screen  02/27/2023    Flu vaccine (1) 08/01/2023    COVID-19 Vaccine (2 - 2023-24 season) 09/01/2023    Diabetic foot exam  02/16/2024    Lipids  03/14/2024    GFR test (Diabetes, CKD 3-4, OR last GFR 15-59)  06/05/2024    A1C test (Diabetic or Prediabetic)  07/10/2024    Depression Monitoring  11/10/2024    Annual Wellness Visit (Medicare)  11/10/2024    Breast cancer screen  05/03/2025    DTaP/Tdap/Td vaccine (3 - Td or Tdap) 09/13/2028    DEXA (modify frequency per FRAX score)  Completed    Shingles vaccine  Completed    Hepatitis C screen  Completed    Hepatitis A vaccine  Aged Out    Hepatitis B vaccine  Aged Out    Hib vaccine  Aged Out    Polio vaccine  Aged Out    Meningococcal (ACWY) vaccine  Aged Out    Pneumococcal 0-64 years Vaccine  Discontinued             (applicable per patient's age: Cancer Screenings, Depression Screening, Fall Risk Screening, Immunizations)    Hemoglobin A1C (%)   Date Value   07/10/2023 6.1 (A)   02/16/2023 6.4 (H)   03/24/2022 6.2 (H)     LDL Cholesterol (mg/dL)   Date Value   03/14/2023 89     AST (U/L)   Date Value   03/14/2023 26     ALT (U/L)   Date Value   03/14/2023 33     BUN (mg/dL)   Date Value   03/14/2023 16      (goal A1C is < 7)   (goal LDL is <100) need 30-50% reduction from baseline     BP Readings from Last 3 Encounters:   11/10/23 102/80   07/10/23 102/80   06/07/23 115/78    (goal /80)      All Future Testing planned in [facility]:  Lab Frequency Next Occurrence   CTA CHEST W CONTRAST Once 06/07/2024       Next Visit Date:  Future Appointments   Date Time Provider Department Center

## 2024-02-12 ENCOUNTER — OFFICE VISIT (OUTPATIENT)
Dept: FAMILY MEDICINE CLINIC | Age: 64
End: 2024-02-12
Payer: MEDICARE

## 2024-02-12 VITALS
BODY MASS INDEX: 30.4 KG/M2 | DIASTOLIC BLOOD PRESSURE: 84 MMHG | SYSTOLIC BLOOD PRESSURE: 124 MMHG | TEMPERATURE: 97.2 F | WEIGHT: 166.2 LBS | HEART RATE: 68 BPM

## 2024-02-12 DIAGNOSIS — M51.16 LUMBAR DISC HERNIATION WITH RADICULOPATHY: ICD-10-CM

## 2024-02-12 DIAGNOSIS — Z13.31 SCREENING FOR DEPRESSION: ICD-10-CM

## 2024-02-12 DIAGNOSIS — N18.30 CONTROLLED TYPE 2 DIABETES MELLITUS WITH STAGE 3 CHRONIC KIDNEY DISEASE, WITHOUT LONG-TERM CURRENT USE OF INSULIN (HCC): ICD-10-CM

## 2024-02-12 DIAGNOSIS — E11.22 CONTROLLED TYPE 2 DIABETES MELLITUS WITH STAGE 3 CHRONIC KIDNEY DISEASE, WITHOUT LONG-TERM CURRENT USE OF INSULIN (HCC): ICD-10-CM

## 2024-02-12 DIAGNOSIS — F33.1 MODERATE EPISODE OF RECURRENT MAJOR DEPRESSIVE DISORDER (HCC): Primary | ICD-10-CM

## 2024-02-12 DIAGNOSIS — I71.21 ANEURYSM OF ASCENDING AORTA WITHOUT RUPTURE (HCC): ICD-10-CM

## 2024-02-12 DIAGNOSIS — I10 ESSENTIAL HYPERTENSION: ICD-10-CM

## 2024-02-12 PROCEDURE — 99214 OFFICE O/P EST MOD 30 MIN: CPT | Performed by: NURSE PRACTITIONER

## 2024-02-12 PROCEDURE — 3017F COLORECTAL CA SCREEN DOC REV: CPT | Performed by: NURSE PRACTITIONER

## 2024-02-12 PROCEDURE — 1090F PRES/ABSN URINE INCON ASSESS: CPT | Performed by: NURSE PRACTITIONER

## 2024-02-12 PROCEDURE — 1123F ACP DISCUSS/DSCN MKR DOCD: CPT | Performed by: NURSE PRACTITIONER

## 2024-02-12 PROCEDURE — 3079F DIAST BP 80-89 MM HG: CPT | Performed by: NURSE PRACTITIONER

## 2024-02-12 PROCEDURE — G8484 FLU IMMUNIZE NO ADMIN: HCPCS | Performed by: NURSE PRACTITIONER

## 2024-02-12 PROCEDURE — G8417 CALC BMI ABV UP PARAM F/U: HCPCS | Performed by: NURSE PRACTITIONER

## 2024-02-12 PROCEDURE — 2022F DILAT RTA XM EVC RTNOPTHY: CPT | Performed by: NURSE PRACTITIONER

## 2024-02-12 PROCEDURE — 3046F HEMOGLOBIN A1C LEVEL >9.0%: CPT | Performed by: NURSE PRACTITIONER

## 2024-02-12 PROCEDURE — G8400 PT W/DXA NO RESULTS DOC: HCPCS | Performed by: NURSE PRACTITIONER

## 2024-02-12 PROCEDURE — G8427 DOCREV CUR MEDS BY ELIG CLIN: HCPCS | Performed by: NURSE PRACTITIONER

## 2024-02-12 PROCEDURE — 1036F TOBACCO NON-USER: CPT | Performed by: NURSE PRACTITIONER

## 2024-02-12 PROCEDURE — 3074F SYST BP LT 130 MM HG: CPT | Performed by: NURSE PRACTITIONER

## 2024-02-12 RX ORDER — LISINOPRIL 40 MG/1
40 TABLET ORAL DAILY
Qty: 30 TABLET | Refills: 5 | Status: SHIPPED
Start: 2024-02-12 | End: 2025-02-11

## 2024-02-12 ASSESSMENT — PATIENT HEALTH QUESTIONNAIRE - PHQ9
5. POOR APPETITE OR OVEREATING: 3
SUM OF ALL RESPONSES TO PHQ QUESTIONS 1-9: 6
4. FEELING TIRED OR HAVING LITTLE ENERGY: 1
7. TROUBLE CONCENTRATING ON THINGS, SUCH AS READING THE NEWSPAPER OR WATCHING TELEVISION: 0
10. IF YOU CHECKED OFF ANY PROBLEMS, HOW DIFFICULT HAVE THESE PROBLEMS MADE IT FOR YOU TO DO YOUR WORK, TAKE CARE OF THINGS AT HOME, OR GET ALONG WITH OTHER PEOPLE: 1
3. TROUBLE FALLING OR STAYING ASLEEP: 0
8. MOVING OR SPEAKING SO SLOWLY THAT OTHER PEOPLE COULD HAVE NOTICED. OR THE OPPOSITE, BEING SO FIGETY OR RESTLESS THAT YOU HAVE BEEN MOVING AROUND A LOT MORE THAN USUAL: 0
2. FEELING DOWN, DEPRESSED OR HOPELESS: 1
SUM OF ALL RESPONSES TO PHQ QUESTIONS 1-9: 6
SUM OF ALL RESPONSES TO PHQ QUESTIONS 1-9: 6
SUM OF ALL RESPONSES TO PHQ9 QUESTIONS 1 & 2: 1
SUM OF ALL RESPONSES TO PHQ QUESTIONS 1-9: 6
9. THOUGHTS THAT YOU WOULD BE BETTER OFF DEAD, OR OF HURTING YOURSELF: 0
1. LITTLE INTEREST OR PLEASURE IN DOING THINGS: 0
6. FEELING BAD ABOUT YOURSELF - OR THAT YOU ARE A FAILURE OR HAVE LET YOURSELF OR YOUR FAMILY DOWN: 1

## 2024-02-12 NOTE — PROGRESS NOTES
year.     Review of Systems  Review of Systems   Constitutional:  Positive for fatigue. Negative for activity change, appetite change, chills and fever.   Respiratory:  Negative for cough, chest tightness, shortness of breath and wheezing.         ARNIE, no CPAP, wears mouth guard    Cardiovascular:  Negative for chest pain, palpitations and leg swelling (minimal end of day).        Now following with cardiology    Gastrointestinal:  Negative for abdominal distention, abdominal pain, constipation, diarrhea (constant loose stools) and nausea.        Hx Diverticulosis   Endocrine:        Checking sugars 1-2 times weekly    Genitourinary:  Negative for difficulty urinating, hematuria and urgency.   Musculoskeletal:  Positive for arthralgias (left hip, left knee) and back pain (chronic ). Negative for gait problem and joint swelling.        Chiropractor/massage once a month  Cardiology told her to stop Mobic? She did for for about a week. Generalized pain worsening, stomach is improved?    Skin:  Negative for rash and wound.        Follows with dermatology   Allergic/Immunologic: Positive for environmental allergies. Negative for food allergies.   Neurological:  Positive for numbness (bilateral hands. left foot). Negative for dizziness, syncope, light-headedness and headaches.   Psychiatric/Behavioral:  Positive for dysphoric mood. Negative for agitation, decreased concentration, self-injury, sleep disturbance and suicidal ideas. The patient is not nervous/anxious.        Physical exam   Physical Exam  Vitals and nursing note reviewed.   Constitutional:       General: She is not in acute distress.     Appearance: Normal appearance. She is well-developed, well-groomed and overweight. She is not ill-appearing or toxic-appearing.   Cardiovascular:      Rate and Rhythm: Normal rate and regular rhythm. No extrasystoles are present.     Heart sounds: Normal heart sounds, S1 normal and S2 normal. No murmur heard.  Pulmonary:

## 2024-02-23 DIAGNOSIS — N18.30 CONTROLLED TYPE 2 DIABETES MELLITUS WITH STAGE 3 CHRONIC KIDNEY DISEASE, WITHOUT LONG-TERM CURRENT USE OF INSULIN (HCC): ICD-10-CM

## 2024-02-23 DIAGNOSIS — E11.22 CONTROLLED TYPE 2 DIABETES MELLITUS WITH STAGE 3 CHRONIC KIDNEY DISEASE, WITHOUT LONG-TERM CURRENT USE OF INSULIN (HCC): ICD-10-CM

## 2024-02-23 RX ORDER — METFORMIN HYDROCHLORIDE 500 MG/1
TABLET, EXTENDED RELEASE ORAL
Qty: 270 TABLET | Refills: 1 | Status: SHIPPED | OUTPATIENT
Start: 2024-02-23

## 2024-02-23 NOTE — TELEPHONE ENCOUNTER
LOV 2/12/24   RTO 5/14/24  LRF 7/13/23          Controlled Substance Monitoring:    Acute and Chronic Pain Monitoring:   RX Monitoring Attestation Periodic Controlled Substance Monitoring   8/7/2018   5:01 PM The Prescription Monitoring Report for this patient was reviewed today. No signs of potential drug abuse or diversion identified.

## 2024-03-06 DIAGNOSIS — I10 ESSENTIAL HYPERTENSION: ICD-10-CM

## 2024-03-06 RX ORDER — LISINOPRIL 40 MG/1
40 TABLET ORAL DAILY
Qty: 30 TABLET | Refills: 5 | Status: SHIPPED | OUTPATIENT
Start: 2024-03-06 | End: 2025-03-06

## 2024-03-06 NOTE — TELEPHONE ENCOUNTER
LOV 2-12-24   RTO 5-14-24  LRF rx was not sent           Controlled Substance Monitoring:    Acute and Chronic Pain Monitoring:   RX Monitoring Attestation Periodic Controlled Substance Monitoring   8/7/2018   5:01 PM The Prescription Monitoring Report for this patient was reviewed today. No signs of potential drug abuse or diversion identified.

## 2024-03-10 DIAGNOSIS — I10 ESSENTIAL HYPERTENSION: ICD-10-CM

## 2024-03-11 RX ORDER — LISINOPRIL 40 MG/1
40 TABLET ORAL DAILY
Qty: 30 TABLET | Refills: 5 | Status: SHIPPED | OUTPATIENT
Start: 2024-03-11 | End: 2024-09-07

## 2024-03-11 NOTE — TELEPHONE ENCOUNTER
Department Center   5/14/2024  8:30 AM Pham Shoemaker, APRN - CNP [place] [facility]   6/7/2024  8:00 AM [place] [facility] STV [facility]   6/12/2024  9:00 AM Segun Mcdonnell MD [facility]            Patient Active Problem List:     Diabetes mellitus 2 with renal manifestations, controlled     Lumbar disc herniation with radiculopathy     Hyperlipidemia     Essential hypertension     Persistent depressive disorder with anxious distress     Peripheral neuropathy     ARNIE (obstructive sleep apnea)     TMJ arthralgia     GERD (gastroesophageal reflux disease)     Acute pain of left hip     Thoracic disc herniation     Unilateral primary osteoarthritis, left hip     Unilateral primary osteoarthritis, left knee     Anxiety     Abnormal ECG     Dilated aortic root (HCC)     Heart palpitations     Right bundle branch block     SOB (shortness of breath) on exertion     Moderate episode of recurrent major depressive disorder (HCC)     You can access the FollowMyHealth Patient Portal offered by [facility] by registering at the following website: [email or web address]. By joining [employer]’s FollowMyHealth portal, you will also be able to view your health information using other applications (apps) compatible with our system. You can access the FollowMyHealth Patient Portal offered by [facility] by registering at the following website: [email or web address]. By joining [employer]’s FollowMyHealth portal, you will also be able to view your health information using other applications (apps) compatible with our system.

## 2024-03-30 DIAGNOSIS — I10 ESSENTIAL HYPERTENSION: ICD-10-CM

## 2024-03-30 DIAGNOSIS — E11.22 CONTROLLED TYPE 2 DIABETES MELLITUS WITH STAGE 3 CHRONIC KIDNEY DISEASE, WITHOUT LONG-TERM CURRENT USE OF INSULIN (HCC): ICD-10-CM

## 2024-03-30 DIAGNOSIS — I51.89 DIASTOLIC DYSFUNCTION: ICD-10-CM

## 2024-03-30 DIAGNOSIS — N18.30 CONTROLLED TYPE 2 DIABETES MELLITUS WITH STAGE 3 CHRONIC KIDNEY DISEASE, WITHOUT LONG-TERM CURRENT USE OF INSULIN (HCC): ICD-10-CM

## 2024-03-30 DIAGNOSIS — E78.00 PURE HYPERCHOLESTEROLEMIA: ICD-10-CM

## 2024-04-01 NOTE — TELEPHONE ENCOUNTER
Please advise to refilling carvedilol? Patients note states it dropped BP too low.    LOV 2/12/24  RTO 3 months; F/U scheduled  LRF 7/13/23 and 8/30/23    Health Maintenance   Topic Date Due    HIV screen  Never done    Pneumococcal 65+ years Vaccine (2 of 2 - PCV) 08/24/2010    Respiratory Syncytial Virus (RSV) Pregnant or age 60 yrs+ (1 - 1-dose 60+ series) Never done    Diabetic Alb to Cr ratio (uACR) test  04/23/2020    Diabetic retinal exam  05/26/2022    Colorectal Cancer Screen  02/27/2023    COVID-19 Vaccine (2 - 2023-24 season) 09/01/2023    Diabetic foot exam  02/16/2024    Lipids  03/14/2024    GFR test (Diabetes, CKD 3-4, OR last GFR 15-59)  06/05/2024    A1C test (Diabetic or Prediabetic)  07/10/2024    Flu vaccine (Season Ended) 08/01/2024    Annual Wellness Visit (Medicare)  11/10/2024    Depression Monitoring  02/12/2025    Breast cancer screen  05/03/2025    DTaP/Tdap/Td vaccine (3 - Td or Tdap) 09/13/2028    DEXA (modify frequency per FRAX score)  Completed    Shingles vaccine  Completed    Hepatitis C screen  Completed    Hepatitis A vaccine  Aged Out    Hepatitis B vaccine  Aged Out    Hib vaccine  Aged Out    Polio vaccine  Aged Out    Meningococcal (ACWY) vaccine  Aged Out    Pneumococcal 0-64 years Vaccine  Discontinued             (applicable per patient's age: Cancer Screenings, Depression Screening, Fall Risk Screening, Immunizations)    Hemoglobin A1C (%)   Date Value   07/10/2023 6.1 (A)   02/16/2023 6.4 (H)   03/24/2022 6.2 (H)     LDL Cholesterol (mg/dL)   Date Value   03/14/2023 89     AST (U/L)   Date Value   03/14/2023 26     ALT (U/L)   Date Value   03/14/2023 33     BUN (mg/dL)   Date Value   03/14/2023 16      (goal A1C is < 7)   (goal LDL is <100) need 30-50% reduction from baseline     BP Readings from Last 3 Encounters:   02/12/24 124/84   11/10/23 102/80   07/10/23 102/80    (goal /80)      All Future Testing planned in [facility]:  Lab Frequency Next Occurrence   CTA

## 2024-04-01 NOTE — TELEPHONE ENCOUNTER
Pham is not in the office for the next week.      Is the patient taking the medication?    Can we verify any of this information with the patient regarding her blood pressure dropping too low with the coreg.  Is she supposed to be taking it or not?    Is the pharmacy sending a request for a refill or the patient?

## 2024-04-04 RX ORDER — CARVEDILOL 6.25 MG/1
TABLET ORAL
Qty: 180 TABLET | Refills: 1 | OUTPATIENT
Start: 2024-04-04 | End: 2025-04-04

## 2024-04-04 NOTE — TELEPHONE ENCOUNTER
In reference of the medication carvedilol, after I found my release info, I am to stop taking Carvedilol 6.25 mg tablets, per [facility] as of 3/18/24.     There was also a change in metFORMIN  mg to only 1-500 mg once a day at breakfast.  Thank you  Nubia Jenkins    [phone]  [email or web address]    Chart updated with medication changes. Please refuse carvedilol script.

## 2024-04-05 RX ORDER — ATORVASTATIN CALCIUM 80 MG/1
80 TABLET, FILM COATED ORAL DAILY
Qty: 90 TABLET | Refills: 1 | Status: SHIPPED | OUTPATIENT
Start: 2024-04-05

## 2024-04-05 SDOH — ECONOMIC STABILITY: INCOME INSECURITY: HOW HARD IS IT FOR YOU TO PAY FOR THE VERY BASICS LIKE FOOD, HOUSING, MEDICAL CARE, AND HEATING?: NOT HARD AT ALL

## 2024-04-05 SDOH — ECONOMIC STABILITY: FOOD INSECURITY: WITHIN THE PAST 12 MONTHS, THE FOOD YOU BOUGHT JUST DIDN'T LAST AND YOU DIDN'T HAVE MONEY TO GET MORE.: NEVER TRUE

## 2024-04-05 SDOH — ECONOMIC STABILITY: FOOD INSECURITY: WITHIN THE PAST 12 MONTHS, YOU WORRIED THAT YOUR FOOD WOULD RUN OUT BEFORE YOU GOT MONEY TO BUY MORE.: NEVER TRUE

## 2024-04-08 ENCOUNTER — OFFICE VISIT (OUTPATIENT)
Dept: FAMILY MEDICINE CLINIC | Age: 64
End: 2024-04-08
Payer: MEDICARE

## 2024-04-08 VITALS
TEMPERATURE: 97.3 F | HEART RATE: 84 BPM | SYSTOLIC BLOOD PRESSURE: 112 MMHG | BODY MASS INDEX: 29.85 KG/M2 | WEIGHT: 163.2 LBS | OXYGEN SATURATION: 100 % | DIASTOLIC BLOOD PRESSURE: 74 MMHG

## 2024-04-08 DIAGNOSIS — E83.42 HYPOMAGNESEMIA: ICD-10-CM

## 2024-04-08 DIAGNOSIS — D64.9 ANEMIA, UNSPECIFIED TYPE: ICD-10-CM

## 2024-04-08 DIAGNOSIS — F51.04 PSYCHOPHYSIOLOGICAL INSOMNIA: ICD-10-CM

## 2024-04-08 DIAGNOSIS — F33.1 MODERATE EPISODE OF RECURRENT MAJOR DEPRESSIVE DISORDER (HCC): Primary | ICD-10-CM

## 2024-04-08 DIAGNOSIS — I10 ESSENTIAL HYPERTENSION: ICD-10-CM

## 2024-04-08 DIAGNOSIS — N18.30 CONTROLLED TYPE 2 DIABETES MELLITUS WITH STAGE 3 CHRONIC KIDNEY DISEASE, WITHOUT LONG-TERM CURRENT USE OF INSULIN (HCC): ICD-10-CM

## 2024-04-08 DIAGNOSIS — E11.22 CONTROLLED TYPE 2 DIABETES MELLITUS WITH STAGE 3 CHRONIC KIDNEY DISEASE, WITHOUT LONG-TERM CURRENT USE OF INSULIN (HCC): ICD-10-CM

## 2024-04-08 DIAGNOSIS — I51.89 DIASTOLIC DYSFUNCTION: ICD-10-CM

## 2024-04-08 DIAGNOSIS — K21.9 GASTROESOPHAGEAL REFLUX DISEASE, UNSPECIFIED WHETHER ESOPHAGITIS PRESENT: ICD-10-CM

## 2024-04-08 DIAGNOSIS — Z98.890 S/P LAMINECTOMY: ICD-10-CM

## 2024-04-08 DIAGNOSIS — E78.00 PURE HYPERCHOLESTEROLEMIA: ICD-10-CM

## 2024-04-08 PROBLEM — R53.1 WEAKNESS: Status: ACTIVE | Noted: 2024-02-14

## 2024-04-08 PROBLEM — M54.16 LUMBAR RADICULOPATHY: Status: ACTIVE | Noted: 2024-02-14

## 2024-04-08 PROBLEM — M51.26 HERNIATED LUMBAR INTERVERTEBRAL DISC: Status: ACTIVE | Noted: 2024-02-14

## 2024-04-08 PROBLEM — M48.061 SPINAL STENOSIS OF LUMBAR REGION: Status: ACTIVE | Noted: 2024-02-14

## 2024-04-08 PROBLEM — Z98.1 HISTORY OF LUMBAR FUSION: Status: ACTIVE | Noted: 2024-04-08

## 2024-04-08 PROCEDURE — 1123F ACP DISCUSS/DSCN MKR DOCD: CPT | Performed by: NURSE PRACTITIONER

## 2024-04-08 PROCEDURE — G8417 CALC BMI ABV UP PARAM F/U: HCPCS | Performed by: NURSE PRACTITIONER

## 2024-04-08 PROCEDURE — 3078F DIAST BP <80 MM HG: CPT | Performed by: NURSE PRACTITIONER

## 2024-04-08 PROCEDURE — 3074F SYST BP LT 130 MM HG: CPT | Performed by: NURSE PRACTITIONER

## 2024-04-08 PROCEDURE — G8427 DOCREV CUR MEDS BY ELIG CLIN: HCPCS | Performed by: NURSE PRACTITIONER

## 2024-04-08 PROCEDURE — 99214 OFFICE O/P EST MOD 30 MIN: CPT | Performed by: NURSE PRACTITIONER

## 2024-04-08 PROCEDURE — 1090F PRES/ABSN URINE INCON ASSESS: CPT | Performed by: NURSE PRACTITIONER

## 2024-04-08 PROCEDURE — G2211 COMPLEX E/M VISIT ADD ON: HCPCS | Performed by: NURSE PRACTITIONER

## 2024-04-08 PROCEDURE — 1036F TOBACCO NON-USER: CPT | Performed by: NURSE PRACTITIONER

## 2024-04-08 PROCEDURE — 3017F COLORECTAL CA SCREEN DOC REV: CPT | Performed by: NURSE PRACTITIONER

## 2024-04-08 PROCEDURE — G8400 PT W/DXA NO RESULTS DOC: HCPCS | Performed by: NURSE PRACTITIONER

## 2024-04-08 PROCEDURE — 3046F HEMOGLOBIN A1C LEVEL >9.0%: CPT | Performed by: NURSE PRACTITIONER

## 2024-04-08 PROCEDURE — 2022F DILAT RTA XM EVC RTNOPTHY: CPT | Performed by: NURSE PRACTITIONER

## 2024-04-08 RX ORDER — OMEPRAZOLE 20 MG/1
20 CAPSULE, DELAYED RELEASE ORAL DAILY
Qty: 90 CAPSULE | Refills: 1 | Status: SHIPPED | OUTPATIENT
Start: 2024-04-08 | End: 2024-10-05

## 2024-04-08 RX ORDER — LISINOPRIL 40 MG/1
40 TABLET ORAL DAILY
Qty: 90 TABLET | Refills: 1 | Status: SHIPPED | OUTPATIENT
Start: 2024-04-08 | End: 2025-04-08

## 2024-04-08 RX ORDER — DULOXETIN HYDROCHLORIDE 60 MG/1
CAPSULE, DELAYED RELEASE ORAL
Qty: 180 CAPSULE | Refills: 1 | Status: SHIPPED | OUTPATIENT
Start: 2024-04-08

## 2024-04-08 RX ORDER — TRAZODONE HYDROCHLORIDE 100 MG/1
100 TABLET ORAL NIGHTLY
Qty: 90 TABLET | Refills: 1 | Status: SHIPPED | OUTPATIENT
Start: 2024-04-08 | End: 2025-04-08

## 2024-04-08 RX ORDER — HYDROCHLOROTHIAZIDE 12.5 MG/1
12.5 TABLET ORAL DAILY
Qty: 90 TABLET | Refills: 1 | Status: SHIPPED | OUTPATIENT
Start: 2024-04-08 | End: 2025-04-08

## 2024-04-08 RX ORDER — METFORMIN HYDROCHLORIDE 500 MG/1
500 TABLET, EXTENDED RELEASE ORAL
Qty: 90 TABLET | Refills: 3 | Status: SHIPPED
Start: 2024-04-08 | End: 2025-04-08

## 2024-04-08 ASSESSMENT — ENCOUNTER SYMPTOMS
ROS SKIN COMMENTS: FOLLOWS WITH DERMATOLOGY
ABDOMINAL PAIN: 0
DIARRHEA: 0
NAUSEA: 0
ABDOMINAL DISTENTION: 0
BACK PAIN: 1
WHEEZING: 0
SHORTNESS OF BREATH: 0
CONSTIPATION: 0
CHEST TIGHTNESS: 0
COUGH: 0

## 2024-04-08 NOTE — PROGRESS NOTES
Dragon dictation software.  Although every effort was made to ensure the accuracy of this automated transcription, some errors in transcription may have occurred.

## 2024-04-09 ENCOUNTER — HOSPITAL ENCOUNTER (OUTPATIENT)
Age: 64
Setting detail: SPECIMEN
Discharge: HOME | End: 2024-04-09

## 2024-04-09 DIAGNOSIS — E11.22 CONTROLLED TYPE 2 DIABETES MELLITUS WITH STAGE 3 CHRONIC KIDNEY DISEASE, WITHOUT LONG-TERM CURRENT USE OF INSULIN (HCC): ICD-10-CM

## 2024-04-09 DIAGNOSIS — N18.30 CONTROLLED TYPE 2 DIABETES MELLITUS WITH STAGE 3 CHRONIC KIDNEY DISEASE, WITHOUT LONG-TERM CURRENT USE OF INSULIN (HCC): ICD-10-CM

## 2024-04-09 DIAGNOSIS — E83.42 HYPOMAGNESEMIA: ICD-10-CM

## 2024-04-09 DIAGNOSIS — E78.00 PURE HYPERCHOLESTEROLEMIA: ICD-10-CM

## 2024-04-09 DIAGNOSIS — D64.9 ANEMIA, UNSPECIFIED TYPE: ICD-10-CM

## 2024-04-09 LAB
ALBUMIN SERPL-MCNC: 4.2 G/DL (ref 3.5–5.2)
ALBUMIN/GLOB SERPL: 2 {RATIO} (ref 1–2.5)
ALP SERPL-CCNC: 115 U/L (ref 35–104)
ALT SERPL-CCNC: 11 U/L (ref 10–35)
ANION GAP SERPL CALCULATED.3IONS-SCNC: 11 MMOL/L (ref 9–16)
AST SERPL-CCNC: 17 U/L (ref 10–35)
BASOPHILS # BLD: 0.04 K/UL (ref 0–0.2)
BASOPHILS NFR BLD: 1 % (ref 0–2)
BILIRUB SERPL-MCNC: 0.3 MG/DL (ref 0–1.2)
BUN SERPL-MCNC: 12 MG/DL (ref 8–23)
CALCIUM SERPL-MCNC: 9.9 MG/DL (ref 8.6–10.4)
CHLORIDE SERPL-SCNC: 100 MMOL/L (ref 98–107)
CHOLEST SERPL-MCNC: 141 MG/DL (ref 0–199)
CHOLESTEROL/HDL RATIO: 3
CO2 SERPL-SCNC: 29 MMOL/L (ref 20–31)
CREAT SERPL-MCNC: 0.7 MG/DL (ref 0.5–0.9)
EOSINOPHIL # BLD: 0.13 K/UL (ref 0–0.44)
EOSINOPHILS RELATIVE PERCENT: 2 % (ref 1–4)
ERYTHROCYTE [DISTWIDTH] IN BLOOD BY AUTOMATED COUNT: 12.5 % (ref 11.8–14.4)
EST. AVERAGE GLUCOSE BLD GHB EST-MCNC: 120 MG/DL
GFR SERPL CREATININE-BSD FRML MDRD: >90 ML/MIN/1.73M2
GLUCOSE SERPL-MCNC: 139 MG/DL (ref 74–99)
HBA1C MFR BLD: 5.8 % (ref 4–6)
HCT VFR BLD AUTO: 31.6 % (ref 36.3–47.1)
HDLC SERPL-MCNC: 48 MG/DL
HGB BLD-MCNC: 9.8 G/DL (ref 11.9–15.1)
IMM GRANULOCYTES # BLD AUTO: 0.03 K/UL (ref 0–0.3)
IMM GRANULOCYTES NFR BLD: 0 %
LDLC SERPL CALC-MCNC: 69 MG/DL (ref 0–100)
LYMPHOCYTES NFR BLD: 2.16 K/UL (ref 1.1–3.7)
LYMPHOCYTES RELATIVE PERCENT: 31 % (ref 24–43)
MAGNESIUM SERPL-MCNC: 1.4 MG/DL (ref 1.6–2.4)
MCH RBC QN AUTO: 28 PG (ref 25.2–33.5)
MCHC RBC AUTO-ENTMCNC: 31 G/DL (ref 28.4–34.8)
MCV RBC AUTO: 90.3 FL (ref 82.6–102.9)
MONOCYTES NFR BLD: 0.49 K/UL (ref 0.1–1.2)
MONOCYTES NFR BLD: 7 % (ref 3–12)
NEUTROPHILS NFR BLD: 59 % (ref 36–65)
NEUTS SEG NFR BLD: 4.2 K/UL (ref 1.5–8.1)
NRBC BLD-RTO: 0 PER 100 WBC
PLATELET # BLD AUTO: 411 K/UL (ref 138–453)
PMV BLD AUTO: 9.5 FL (ref 8.1–13.5)
POTASSIUM SERPL-SCNC: 3.9 MMOL/L (ref 3.7–5.3)
PROT SERPL-MCNC: 6.5 G/DL (ref 6.6–8.7)
RBC # BLD AUTO: 3.5 M/UL (ref 3.95–5.11)
SODIUM SERPL-SCNC: 140 MMOL/L (ref 136–145)
TRIGL SERPL-MCNC: 125 MG/DL
VLDLC SERPL CALC-MCNC: 25 MG/DL
WBC OTHER # BLD: 7.1 K/UL (ref 3.5–11.3)

## 2024-05-05 DIAGNOSIS — E83.42 HYPOMAGNESEMIA: Primary | ICD-10-CM

## 2024-05-09 ENCOUNTER — HOSPITAL ENCOUNTER (OUTPATIENT)
Age: 64
Setting detail: THERAPIES SERIES
Discharge: HOME | End: 2024-05-09
Payer: MEDICARE

## 2024-05-09 PROCEDURE — 97161 PT EVAL LOW COMPLEX 20 MIN: CPT

## 2024-05-09 NOTE — CONSULTS
Posture       Forward Head [] [x] []    Rounded Shoulders [] [x] []    Kyphosis [] [x] []    Lordosis [] [x] [] decreased      Fell last fall tripping over LLE                                  Luis Fall Risk Assessment    Risk Factor Scale  Score   History of Falls [x] Yes  [] No 25  0 25   Secondary Diagnosis [x] Yes  [] No 15  0 15   Ambulatory Aid [] Furniture  [] Crutches/cane/walker  [x] None/bedrest/wheelchair/nurse 30  15  0 0   IV/Heparin Lock [] Yes  [x] No 20  0 0   Gait/Transferring [] Impaired  [x] Weak  [] Normal/bedrest/immobile 20  10  0 10   Mental Status [] Forgets limitations  [x] Oriented to own ability 15  0 0      Total:50     Based on the Assessment score: check the appropriate box.    []  No intervention needed   Low =   Score of 0-24    []  Use standard prevention interventions Moderate =  Score of 24-44   [] Give patient handout and discuss fall prevention strategies   [] Establish goal of education for patient/family RE: fall prevention strategies    [x]  Use high risk prevention interventions High = Score of 45 and higher   [x] Give patient handout and discuss fall prevention strategies   [x] Establish goal of education for patient/family Re: fall prevention strategies   [x] Discuss lifeline / other resources        Functional Test: AMBER Score: 14/50 = 28% functionally impaired     Comments:    Assessment:  Patient presents with symptoms consistrent with s/p lumbar fusion    Patient would benefit from skilled physical therapy services in order to: address deficits as stated to restore  mobility, dressing, donning LE's, retrieving from lower shelves, lifting > 15#, household chores and laundry are limited, cooking, barn chores    Problem list, as detailed above:   [x] ? Back Pain:     [x] ? ROM:     [x] ? Strength:   [x] ? Function:  [] Postural Deviations  [x] Gait Deviations  [] Other:     STG: (to be met in 10 treatments)  ? Pain: LBP < 5/10 to facilitate sleep  ? ROM: Lumbar flexon to

## 2024-05-21 ENCOUNTER — HOSPITAL ENCOUNTER (OUTPATIENT)
Age: 64
Setting detail: THERAPIES SERIES
Discharge: HOME | End: 2024-05-21
Payer: MEDICARE

## 2024-05-21 PROCEDURE — 97113 AQUATIC THERAPY/EXERCISES: CPT

## 2024-05-21 NOTE — FLOWSHEET NOTE
[][facility]  [address], [address]  [place] [address]  [phone]  F: [phone] [x] [facility] &  Therapy  [address]   P: [phone]  F: [phone]     Physical Therapy Daily Treatment Note    Date:  2024  Patient Name:  Nubia Jenkins    :  1960  MRN: [id number]  Physician: Daina Sultana MD                                Insurance: , MMO: BM[employer], 80/20%  Medical Diagnosis: Z98.1 (ICD-10-CM) - S/P lumbar fusion                       Rehab Codes: M25.60, Z74.09, M54.6, M54.59  Onset Date: 3/14/2024                      Next 's appt.: 2024     Visit# / total visits: ; Progress note for Medicare patient due at visit 10     Cancels/No Shows: 0/0    Subjective:    Pain:  [x] Yes  [] No Location: right thoracic spine to flank, lumbar stiffness Pain Rating: (0-10 scale) 4/10  Pain altered Tx:  [x] No  [] Yes  Action:    Comments: Pt reports right thoracic to flank soreness with low back stiffness.  Intensity of sx ave 4/10.  Pt states she has been active at home but mindful of avoiding weighted or resisted activities--watching her body mechanics.     Objective:    Modalities: N/A  Precautions: Fall Risk, S/P lumbar fusion  Shallow water enter/exit side of pool  Date 24       Visit # 2       Walk F/L/R 4 laps        Marching X 20        Squats X 20       Step-Ups F/L X 20        Step Down F/L Yellow without UE's  X 20        Heel-toe raises X 20        SLR F/L/R X 20        Hip/Knee Flex/Ext        F/L Lunges Yellow step         X 20        Kickboard Ex.        Iso Abd.        Push-pull 2 x 10       Paddling                UE Format: Paddles        Horiz Abd/Add X 20       IR/ER (wipers)        Alt Flex/Ext X 20 within pain-free range       Alt Press Down        Abd/Add X 20       Stretches        Achllies        Hamstring                Cool Down 2 laps       Pain

## 2024-05-23 ENCOUNTER — HOSPITAL ENCOUNTER (OUTPATIENT)
Age: 64
Setting detail: THERAPIES SERIES
Discharge: HOME | End: 2024-05-23
Payer: MEDICARE

## 2024-05-23 PROCEDURE — 97113 AQUATIC THERAPY/EXERCISES: CPT

## 2024-05-23 NOTE — FLOWSHEET NOTE
[][facility] Rehabilitation &  Therapy  [address], [address]  [place] [address]  [phone]  F: [phone] [x] [facility]  Outpatient Rehabilitation &  Therapy  [address]   P: [phone]  F: [phone]     Physical Therapy Daily Treatment Note    Date:  2024  Patient Name:  Nubia Jenkins    :  1960  MRN: [id number]  Physician: Daina Sultana MD                                Insurance: , MMO: BMN, 80/20%  Medical Diagnosis: Z98.1 (ICD-10-CM) - S/P lumbar fusion                       Rehab Codes: M25.60, Z74.09, M54.6, M54.59  Onset Date: 3/14/2024                      Next 's appt.: 2024     Visit# / total visits: 3/24; Progress note for Medicare patient due at visit 10     Cancels/No Shows: 0/0    Subjective:    Pain:  [x] Yes  [] No Location: bilateral low back and right thoracic spine to flank stiffness Pain Rating: (0-10 scale) 3/10  Pain altered Tx:  [x] No  [] Yes  Action:    Comments: Pt reports bilateral low back stiffness, and right thoracic soreness, denies sharp pain. Good tolerance to initial aquatic session.     Objective: left LE weakness    Modalities: N/A  Precautions: Fall Risk, S/P lumbar fusion    Shallow water enter/exit side of pool  Date 24      Visit # 2 3      Walk F/L/R 4 laps  4 laps       Marching X 20  X 20      Squats X 20 X 20      Step-Ups F/L Yellow without UE's  X 20  Yellow without UE's  X 20       Heel-toe raises X 20  X 20       SLR F/L/R X 20  X 20      Butt kicks   X 20      F/L Lunges Yellow step  Lt blue step       X 20  X 20      Kickboard Ex.  Small blue       Iso Abd.  NP       Push-pull 2 x 10 X 20      Trunk rotations  X 20              UE Format: Paddles  Closed Paddles      Horiz Abd/Add X 20 X 20      IR/ER (wipers)  X 20       Bicep curls  X 20  X 20      Abd/Add X 20 X 20       Stretches        Achllies  15\" x 4       Hamstring                Cool Down 2 laps

## 2024-05-24 ENCOUNTER — OFFICE VISIT (OUTPATIENT)
Dept: FAMILY MEDICINE CLINIC | Age: 64
End: 2024-05-24
Payer: MEDICARE

## 2024-05-24 ENCOUNTER — HOSPITAL ENCOUNTER (OUTPATIENT)
Age: 64
Setting detail: SPECIMEN
Discharge: HOME | End: 2024-05-24

## 2024-05-24 VITALS
BODY MASS INDEX: 29.63 KG/M2 | HEART RATE: 71 BPM | DIASTOLIC BLOOD PRESSURE: 84 MMHG | SYSTOLIC BLOOD PRESSURE: 130 MMHG | TEMPERATURE: 96.8 F | WEIGHT: 162 LBS

## 2024-05-24 DIAGNOSIS — R53.1 WEAKNESS: ICD-10-CM

## 2024-05-24 DIAGNOSIS — F41.9 ANXIETY: ICD-10-CM

## 2024-05-24 DIAGNOSIS — E83.42 HYPOMAGNESEMIA: ICD-10-CM

## 2024-05-24 DIAGNOSIS — G47.33 OSA (OBSTRUCTIVE SLEEP APNEA): ICD-10-CM

## 2024-05-24 DIAGNOSIS — F33.1 MODERATE EPISODE OF RECURRENT MAJOR DEPRESSIVE DISORDER (HCC): Primary | ICD-10-CM

## 2024-05-24 DIAGNOSIS — N18.30 CONTROLLED TYPE 2 DIABETES MELLITUS WITH STAGE 3 CHRONIC KIDNEY DISEASE, WITHOUT LONG-TERM CURRENT USE OF INSULIN (HCC): ICD-10-CM

## 2024-05-24 DIAGNOSIS — D64.9 LOW HEMOGLOBIN: ICD-10-CM

## 2024-05-24 DIAGNOSIS — E11.22 CONTROLLED TYPE 2 DIABETES MELLITUS WITH STAGE 3 CHRONIC KIDNEY DISEASE, WITHOUT LONG-TERM CURRENT USE OF INSULIN (HCC): ICD-10-CM

## 2024-05-24 DIAGNOSIS — R00.2 HEART PALPITATIONS: ICD-10-CM

## 2024-05-24 DIAGNOSIS — I10 ESSENTIAL HYPERTENSION: ICD-10-CM

## 2024-05-24 LAB
ERYTHROCYTE [DISTWIDTH] IN BLOOD BY AUTOMATED COUNT: 13.2 % (ref 11.8–14.4)
FERRITIN SERPL-MCNC: 15 NG/ML (ref 13–150)
HCT VFR BLD AUTO: 36.1 % (ref 36.3–47.1)
HGB BLD-MCNC: 10.7 G/DL (ref 11.9–15.1)
IRON SATN MFR SERPL: 7 % (ref 20–55)
IRON SERPL-MCNC: 28 UG/DL (ref 37–145)
MAGNESIUM SERPL-MCNC: 1.4 MG/DL (ref 1.6–2.4)
MCH RBC QN AUTO: 25.8 PG (ref 25.2–33.5)
MCHC RBC AUTO-ENTMCNC: 29.6 G/DL (ref 28.4–34.8)
MCV RBC AUTO: 87.2 FL (ref 82.6–102.9)
NRBC BLD-RTO: 0 PER 100 WBC
PLATELET # BLD AUTO: 328 K/UL (ref 138–453)
PMV BLD AUTO: 10.6 FL (ref 8.1–13.5)
RBC # BLD AUTO: 4.14 M/UL (ref 3.95–5.11)
TIBC SERPL-MCNC: 374 UG/DL (ref 250–450)
UNSATURATED IRON BINDING CAPACITY: 346 UG/DL (ref 112–347)
WBC OTHER # BLD: 6.7 K/UL (ref 3.5–11.3)

## 2024-05-24 PROCEDURE — 3079F DIAST BP 80-89 MM HG: CPT | Performed by: NURSE PRACTITIONER

## 2024-05-24 PROCEDURE — 3075F SYST BP GE 130 - 139MM HG: CPT | Performed by: NURSE PRACTITIONER

## 2024-05-24 PROCEDURE — 99214 OFFICE O/P EST MOD 30 MIN: CPT | Performed by: NURSE PRACTITIONER

## 2024-05-24 PROCEDURE — 3044F HG A1C LEVEL LT 7.0%: CPT | Performed by: NURSE PRACTITIONER

## 2024-05-24 PROCEDURE — 1123F ACP DISCUSS/DSCN MKR DOCD: CPT | Performed by: NURSE PRACTITIONER

## 2024-05-24 PROCEDURE — 2022F DILAT RTA XM EVC RTNOPTHY: CPT | Performed by: NURSE PRACTITIONER

## 2024-05-24 PROCEDURE — G8417 CALC BMI ABV UP PARAM F/U: HCPCS | Performed by: NURSE PRACTITIONER

## 2024-05-24 PROCEDURE — 1036F TOBACCO NON-USER: CPT | Performed by: NURSE PRACTITIONER

## 2024-05-24 PROCEDURE — G8427 DOCREV CUR MEDS BY ELIG CLIN: HCPCS | Performed by: NURSE PRACTITIONER

## 2024-05-24 PROCEDURE — 3017F COLORECTAL CA SCREEN DOC REV: CPT | Performed by: NURSE PRACTITIONER

## 2024-05-24 PROCEDURE — G8400 PT W/DXA NO RESULTS DOC: HCPCS | Performed by: NURSE PRACTITIONER

## 2024-05-24 PROCEDURE — 1090F PRES/ABSN URINE INCON ASSESS: CPT | Performed by: NURSE PRACTITIONER

## 2024-05-24 PROCEDURE — G2211 COMPLEX E/M VISIT ADD ON: HCPCS | Performed by: NURSE PRACTITIONER

## 2024-05-24 RX ORDER — CALCIUM CARBONATE/VITAMIN D3 500-10/5ML
400 LIQUID (ML) ORAL DAILY
Qty: 30 CAPSULE | Refills: 2 | Status: SHIPPED | OUTPATIENT
Start: 2024-05-24 | End: 2025-05-24

## 2024-05-24 NOTE — PROGRESS NOTES
[facility]   [address] [address]  [phone]    5/24/24     Patient ID  Nubia Jenkins is a 63 y.o. female  Established patient    Chief Complaint  Nubia Jenkins presents today for Hypertension and Diabetes      ASSESSMENT/PLAN  1. Moderate episode of recurrent major depressive disorder (HCC)  2. Anxiety  3. Weakness  -     Iron and TIBC; Future  -     Ferritin; Future  4. Hypomagnesemia  -     Magnesium Oxide 400 MG CAPS; Take 400 mg by mouth daily, Disp-30 capsule, R-2Normal  5. Heart palpitations  6. Essential hypertension  7. ARNIE (obstructive sleep apnea)  8. Controlled type 2 diabetes mellitus with stage 3 chronic kidney disease, without long-term current use of insulin (HCC)  9. Low hemoglobin  -     Iron and TIBC; Future  -     Ferritin; Future     Still undecided regarding  referral   No changes in pharmaology.   Check labs  - start magnesium  If Hbg still low will need GI workup   No changes in pharmaology.       Return in about 3 months (around 8/24/2024) for DM, HTN.      Patient Care Team:  Pham Shoemaker APRN - CNP as PCP - General (Nurse Practitioner Family)  Pham Shoemaker APRN - CNP as PCP - Empaneled Provider  Laxmi Vigil MD as Physician (Dermatology)  Earline Lopez MD as Physician (Gynecology)  Therese Donato, PhD (Psychology)    SUBJECTIVE/OBJECTIVE  History of Present illness / Visit Summary   Patient presents today for follow up   Reviewed health maintenance and any recent labs/imaging    Since last OV did start water therapy for her back   To follow up with neurosurgery after completion of therapy - until mid June         Review of Systems  Review of Systems   Constitutional:  Positive for appetite change (poor) and fatigue (more over past week). Negative for activity change, chills and fever.   Respiratory:  Negative for cough, chest tightness, shortness of breath and wheezing.         ARNIE, no CPAP, wears mouth guard

## 2024-05-28 ENCOUNTER — HOSPITAL ENCOUNTER (OUTPATIENT)
Age: 64
Setting detail: THERAPIES SERIES
Discharge: HOME | End: 2024-05-28
Payer: MEDICARE

## 2024-05-28 DIAGNOSIS — E61.1 IRON DEFICIENCY: Primary | ICD-10-CM

## 2024-05-28 DIAGNOSIS — E83.42 HYPOMAGNESEMIA: ICD-10-CM

## 2024-05-28 PROCEDURE — 97113 AQUATIC THERAPY/EXERCISES: CPT

## 2024-05-28 RX ORDER — FERROUS SULFATE 325(65) MG
325 TABLET ORAL 2 TIMES DAILY
Qty: 60 TABLET | Refills: 2 | Status: SHIPPED | OUTPATIENT
Start: 2024-05-28 | End: 2025-05-28

## 2024-05-28 NOTE — FLOWSHEET NOTE
UE Format: Paddles  Closed Paddles Closed Paddles     Horiz Abd/Add X 20 X 20 X 20      IR/ER (wipers)  X 20  X 20      Bicep curls  X 20  X 20 X 20      Abd/Add X 20 X 20  X 20      Stretches        Achllies  15\" x 4  15\" x 4     Hamstring        Tandem walk    4 laps     Cool Down 2 laps  2 laps     Pain Rating  2/10 stiffness 1/10     Other:     Response to Treatment:    Pt tolerated session well, progressed strengthening activities and step-work today.  Decreased low back and thoracic tightness/soreness to 1/10 post session.  Strength and tolerance to activities continues to improve.     Assessment: [x] Progressing toward goals.    [] No change.     [] Other:  [x] Patient would continue to benefit from skilled physical therapy services in order to address deficits as stated to restore  mobility, dressing, donning LE's, retrieving from lower shelves, lifting > 15#, household chores and laundry are limited, cooking, barn chores     STG: (to be met in 10 treatments)  ? Pain: LBP < 5/10 to facilitate sleep  ? ROM: Lumbar flexon to WFL to facilitate dressing  ? Strength: B LE B4+/5, core flexion 2/5 to facilitate lifting  ? Function: Patient to dress independently at shane pace  Demonstrate Knowledge of fall prevention  LTG: (to be met in 24 treatments)  Improve AMBER score to < 5% functionally impaired to facilitate performing household chores  Patient to be independent with home exercise program as demonstrated by performance with correct form without cues.        Patient goals: Regain strength and balance       Pt. Education:  [x] Plans/Goals, Risks/Benefits discussed  [] Home exercise program  Method of Education: [x] Verbal  [] Demo  [] Written  Comprehension of Education:  [x] Verbalizes understanding.  [] Demonstrates understanding.  [] Needs Review.  [] Demonstrates/verbalizes understanding of HEP/Ed previously given.     Plan: [x] Continue current frequency toward long and short term goals.    [x] Specific

## 2024-05-30 ENCOUNTER — HOSPITAL ENCOUNTER (OUTPATIENT)
Age: 64
Setting detail: THERAPIES SERIES
Discharge: HOME | End: 2024-05-30
Payer: MEDICARE

## 2024-05-30 PROCEDURE — 97113 AQUATIC THERAPY/EXERCISES: CPT

## 2024-05-30 NOTE — FLOWSHEET NOTE
[] Written  Comprehension of Education:  [x] Verbalizes understanding.  [] Demonstrates understanding.  [] Needs Review.  [] Demonstrates/verbalizes understanding of HEP/Ed previously given.     Plan: [x] Continue current frequency toward long and short term goals.    [x] Specific Instructions for subsequent treatments: aquatic exercise for ROM and strengthening                 Frequency:  2 x/week for 24 visits         Treatment Charges: Mins Units   []  Modalities       []  Ther Exercise       []  Manual Therapy       []  Ther Activities       [x]  Aquatics 45 3   []  Neuromuscular       [] Vasocompression       [] Gait Training       []  Other       Total billable time 45 3   15 min changing time unbilled    Time In:  1100            Time Out: 1200    Electronically signed by:  Kyle Reyes PTA

## 2024-06-06 ENCOUNTER — HOSPITAL ENCOUNTER (OUTPATIENT)
Age: 64
Setting detail: THERAPIES SERIES
Discharge: HOME | End: 2024-06-06
Payer: MEDICARE

## 2024-06-06 PROCEDURE — 97113 AQUATIC THERAPY/EXERCISES: CPT

## 2024-06-06 NOTE — FLOWSHEET NOTE
[][facility] Rehabilitation &  Therapy  [address], [address]  [place] [facility] [address]  [phone]  F: [phone] [x] [facility]  Outpatient Rehabilitation &  Therapy  [address]   P: [phone]  F: [phone]     Physical Therapy Daily Treatment Note    Date:  2024  Patient Name:  Nubia Jenkins    :  1960  MRN: [id number]  Physician: Daina Sultana MD                                Insurance: , MMO: BMN, 80/20%  Medical Diagnosis: Z98.1 (ICD-10-CM) - S/P lumbar fusion                       Rehab Codes: M25.60, Z74.09, M54.6, M54.59  Onset Date: 3/14/2024                      Next 's appt.: 2024     Visit# / total visits: ; Progress note for Medicare patient due at visit 10     Cancels/No Shows: 0/0    Subjective:    Pain:  [x] Yes  [] No Location: bilateral low back and right thoracic spine to flank stiffness/soreness Pain Rating: (0-10 scale) 0/10 low back, 3/10 right thoracic spine.  Pain altered Tx:  [x] No  [] Yes  Action:    Comments: Pt noted the pain in thoracic region was much improved today and not wrapping around like it has been. Pt did state she had gotten her hair done yesterday and \"it was very straining to get hair washed in the bowl\" but no inc in sx today.    Objective:  Left LE weakness > R LE    Modalities: N/A  Precautions: Fall Risk, S/P lumbar fusion    Shallow water enter/exit side of pool  Date 24   Visit # 2 3 4 5         6   Walk F/L/R 4 laps  4 laps  4 laps 4 laps     4 laps   Marching X 20  X 20 X 20  X 20      X 20   Squats X 20 X 20 X 20      Step-Up and over, fwd and lat Yellow without UE's  X 20  Yellow without UE's  X 20  Orange Speedo step x 20 each Orange Speedo step x 20 each    Orange  Speedo step     X 20 each   Heel-toe raises X 20  X 20  X 20  X 20      X 20   SLR F/L/R X 20  X 20 X 20  X 20      X 20   Butt kicks   X 20 X 20  X 20      X 20

## 2024-06-07 ENCOUNTER — HOSPITAL ENCOUNTER (OUTPATIENT)
Dept: CT IMAGING | Age: 64
End: 2024-06-07
Payer: MEDICARE

## 2024-06-07 DIAGNOSIS — I71.21 ANEURYSM OF ASCENDING AORTA WITHOUT RUPTURE (HCC): ICD-10-CM

## 2024-06-07 LAB
CREAT SERPL-MCNC: 0.8 MG/DL (ref 0.5–0.9)
GFR, ESTIMATED: 82 ML/MIN/1.73M2

## 2024-06-07 PROCEDURE — 71275 CT ANGIOGRAPHY CHEST: CPT

## 2024-06-07 PROCEDURE — 2580000003 HC RX 258: Performed by: NURSE PRACTITIONER

## 2024-06-07 PROCEDURE — 6360000004 HC RX CONTRAST MEDICATION: Performed by: NURSE PRACTITIONER

## 2024-06-07 PROCEDURE — 82565 ASSAY OF CREATININE: CPT

## 2024-06-07 RX ORDER — SODIUM CHLORIDE 0.9 % (FLUSH) 0.9 %
10 SYRINGE (ML) INJECTION PRN
Status: DISCONTINUED | OUTPATIENT
Start: 2024-06-07 | End: 2024-06-10 | Stop reason: HOSPADM

## 2024-06-07 RX ORDER — 0.9 % SODIUM CHLORIDE 0.9 %
80 INTRAVENOUS SOLUTION INTRAVENOUS ONCE
Status: COMPLETED | OUTPATIENT
Start: 2024-06-07 | End: 2024-06-07

## 2024-06-07 RX ADMIN — SODIUM CHLORIDE, PRESERVATIVE FREE 10 ML: 5 INJECTION INTRAVENOUS at 08:13

## 2024-06-07 RX ADMIN — IOPAMIDOL 100 ML: 755 INJECTION, SOLUTION INTRAVENOUS at 08:13

## 2024-06-07 RX ADMIN — SODIUM CHLORIDE 80 ML: 9 INJECTION, SOLUTION INTRAVENOUS at 08:13

## 2024-06-11 ENCOUNTER — HOSPITAL ENCOUNTER (OUTPATIENT)
Age: 64
Setting detail: THERAPIES SERIES
Discharge: HOME | End: 2024-06-11
Payer: MEDICARE

## 2024-06-11 PROCEDURE — 97113 AQUATIC THERAPY/EXERCISES: CPT

## 2024-06-11 NOTE — FLOWSHEET NOTE
[][facility] Rehabilitation &  Therapy  [address], [address]  [place] [address]  [phone]  F: [phone] [x] [facility]  Outpatient Rehabilitation &  Therapy  [address]   P: [phone]  F: [phone]     Physical Therapy Daily Treatment Note    Date:  2024  Patient Name:  Nubia Jenkins    :  1960  MRN: [id number]  Physician: Daina Sultana MD                                Insurance: , MMO: BMN, 80/20%  Medical Diagnosis: Z98.1 (ICD-10-CM) - S/P lumbar fusion                       Rehab Codes: M25.60, Z74.09, M54.6, M54.59  Onset Date: 3/14/2024                      Next 's appt.: 2024     Visit# / total visits: ; Progress note for Medicare patient due at visit 10     Cancels/No Shows: 0/0    Subjective:    Pain:  [x] Yes  [] No Location: bilateral low back stiffness/soreness Pain Rating: (0-10 scale) 1/10   Pain altered Tx:  [x] No  [] Yes  Action:    Comments: Pt reports some mid and low back stiffness.  States she is trying to do more at home outside and around the house.  Pt denies any provocation of sharper sx, states left LE is beginning to feel stronger.     Objective:  Left LE weakness > R LE    Modalities: N/A  Precautions: Fall Risk, S/P lumbar fusion    Shallow water enter/exit side of pool  Date 24      Low current on throughout session   Visit # 5         6 7   Walk F/L/R 4 laps     4 laps 4 laps    Marching X 20      X 20 X 20    Squats   Lt blue step  X 20   Step-Up and over, fwd and lat Orange Speedo step x 20 each    Orange  Speedo step     X 20 each   Orange  Speedo step   Heel-toe raises X 20      X 20 X 20    SLR F/L/R X 20      X 20 X 20    Butt kicks  X 20      X 20 X 20    F/L Lunges Lt blue step  Lt blue step Lt blue step    X 20     X 20 X 20   Kickboard Ex. Small blue   Small blue Small blue   Iso Abd. X 15      X 15 X 15    Push-pull X 20       X 20 X 20    Trunk

## 2024-06-12 ENCOUNTER — HOSPITAL ENCOUNTER (OUTPATIENT)
Age: 64
Setting detail: SPECIMEN
Discharge: HOME | End: 2024-06-12

## 2024-06-12 ENCOUNTER — OFFICE VISIT (OUTPATIENT)
Dept: CARDIOTHORACIC SURGERY | Age: 64
End: 2024-06-12

## 2024-06-12 VITALS
DIASTOLIC BLOOD PRESSURE: 77 MMHG | HEART RATE: 68 BPM | HEIGHT: 62 IN | BODY MASS INDEX: 29.63 KG/M2 | OXYGEN SATURATION: 97 % | WEIGHT: 161 LBS | SYSTOLIC BLOOD PRESSURE: 113 MMHG

## 2024-06-12 DIAGNOSIS — I71.21 ANEURYSM OF ASCENDING AORTA WITHOUT RUPTURE (HCC): Primary | ICD-10-CM

## 2024-06-12 DIAGNOSIS — E83.42 HYPOMAGNESEMIA: ICD-10-CM

## 2024-06-12 DIAGNOSIS — E61.1 IRON DEFICIENCY: ICD-10-CM

## 2024-06-12 LAB
ERYTHROCYTE [DISTWIDTH] IN BLOOD BY AUTOMATED COUNT: 14.8 % (ref 11.8–14.4)
HCT VFR BLD AUTO: 38.6 % (ref 36.3–47.1)
HGB BLD-MCNC: 11.1 G/DL (ref 11.9–15.1)
IRON SATN MFR SERPL: 28 % (ref 20–55)
IRON SERPL-MCNC: 100 UG/DL (ref 37–145)
MAGNESIUM SERPL-MCNC: 1.8 MG/DL (ref 1.6–2.4)
MCH RBC QN AUTO: 25.7 PG (ref 25.2–33.5)
MCHC RBC AUTO-ENTMCNC: 28.8 G/DL (ref 28.4–34.8)
MCV RBC AUTO: 89.4 FL (ref 82.6–102.9)
NRBC BLD-RTO: 0 PER 100 WBC
PLATELET # BLD AUTO: 319 K/UL (ref 138–453)
PMV BLD AUTO: 10.5 FL (ref 8.1–13.5)
RBC # BLD AUTO: 4.32 M/UL (ref 3.95–5.11)
TIBC SERPL-MCNC: 354 UG/DL (ref 250–450)
UNSATURATED IRON BINDING CAPACITY: 254 UG/DL (ref 112–347)
WBC OTHER # BLD: 7.3 K/UL (ref 3.5–11.3)

## 2024-06-12 NOTE — PROGRESS NOTES
[facility]  Office Visit      Subjective:  Ms. Jenkins is a 63 y.o. female s/p follow-up regarding stable ascending aortic dilation.  Patient has no chest pain shortness of breath nausea vomiting diarrhea fever chills.  The ascending dilation is exactly the same as it was on prior study.  No concerns noted.    Physical Exam  Vitals:  Vitals:    06/12/24 0855   BP: 113/77   Pulse: 68   SpO2: 97%       General: Alert and Oriented x3. Ambulatory. No apparent distress.  Chest:  No abnormality.  Equal and symmetric expansion with respiration.  Lungs:  Clear to auscultation.  Cardiac:  Regular rate and rhythm without murmurs, rubs or gallops.   Abdomen:  Soft, non-tender, normoactive bowel sounds.   Extremities:  No edema.  Intact pulses in all four extremities.  Psychiatric: Mood and affect are appropriate.  Ascending aorta measured at 4.1 cm with no sign of dissection.    Current Medications:    Current Outpatient Medications:     ferrous sulfate (IRON 325) 325 (65 Fe) MG tablet, Take 1 tablet by mouth 2 times daily, Disp: 60 tablet, Rfl: 2    Magnesium Oxide 400 MG CAPS, Take 400 mg by mouth daily, Disp: 30 capsule, Rfl: 2    metFORMIN (GLUCOPHAGE-XR) 500 MG extended release tablet, Take 1 tablet by mouth daily (with breakfast), Disp: 90 tablet, Rfl: 3    DULoxetine (CYMBALTA) 60 MG extended release capsule, TAKE 1 CAPSULE BY MOUTH TWICE A DAY, Disp: 180 capsule, Rfl: 1    hydroCHLOROthiazide 12.5 MG tablet, Take 1 tablet by mouth daily, Disp: 90 tablet, Rfl: 1    lisinopril (PRINIVIL;ZESTRIL) 40 MG tablet, Take 1 tablet by mouth daily, Disp: 90 tablet, Rfl: 1    omeprazole (PRILOSEC) 20 MG delayed release capsule, Take 1 capsule by mouth Daily, Disp: 90 capsule, Rfl: 1    traZODone (DESYREL) 100 MG tablet, Take 1 tablet by mouth nightly, Disp: 90 tablet, Rfl: 1    atorvastatin (LIPITOR) 80 MG tablet, TAKE 1 TABLET BY MOUTH DAILY, Disp: 90 tablet, Rfl: 1    Handicap Placard MISC, by Does

## 2024-06-13 ENCOUNTER — HOSPITAL ENCOUNTER (OUTPATIENT)
Age: 64
Setting detail: THERAPIES SERIES
Discharge: HOME | End: 2024-06-13
Payer: MEDICARE

## 2024-06-13 PROCEDURE — 97113 AQUATIC THERAPY/EXERCISES: CPT

## 2024-06-13 NOTE — FLOWSHEET NOTE
discussed  [] Home exercise program  Method of Education: [x] Verbal  [] Demo  [] Written  Comprehension of Education:  [x] Verbalizes understanding.  [] Demonstrates understanding.  [] Needs Review.  [] Demonstrates/verbalizes understanding of HEP/Ed previously given.     Plan: [x] Continue current frequency toward long and short term goals.    [x] Specific Instructions for subsequent treatments: aquatic exercise for ROM and strengthening                 Frequency:  2 x/week for 24 visits         Treatment Charges: Mins Units   []  Modalities       []  Ther Exercise       []  Manual Therapy       []  Ther Activities       [x]  Aquatics 45 3   []  Neuromuscular       [] Vasocompression       [] Gait Training       []  Other       Total billable time 45 3   10 - 15 min changing time unbilled    Time In: 0845  Time Out: 0945    Electronically signed by:  Kyle Reyes PTA

## 2024-06-24 ENCOUNTER — HOSPITAL ENCOUNTER (OUTPATIENT)
Age: 64
Setting detail: THERAPIES SERIES
Discharge: HOME | End: 2024-06-24
Payer: MEDICARE

## 2024-06-24 PROCEDURE — 97113 AQUATIC THERAPY/EXERCISES: CPT

## 2024-06-24 NOTE — FLOWSHEET NOTE
[][facility] Rehabilitation &  Therapy  [address]  [place] [facility] [address]  [phone]  F: [phone] [x] [facility]  Outpatient Rehabilitation &  Therapy  [address]   P: [phone]  F: [phone]     Physical Therapy Daily Treatment Note    Date:  2024  Patient Name:  Nubia Jenkins    :  1960  MRN: [id number]  Physician: Daina Sultana MD                                Insurance: , MMO: BMN, 80/20%  Medical Diagnosis: Z98.1 (ICD-10-CM) - S/P lumbar fusion                       Rehab Codes: M25.60, Z74.09, M54.6, M54.59  Onset Date: 3/14/2024                      Next 's appt.: 2024     Visit# / total visits: ; Progress note for Medicare patient due at visit 10     Cancels/No Shows: 0/0    *PROGRESS NOTE DUE NEXT SESSION*    Subjective:  Pt arrives at 12:21 PM, appt time at 12:00. Pt thought appt time was at 12:30. Therapist will treat patient despite late arrival.  Pain:  [x] Yes  [] No Location: bilateral low back stiffness/soreness Pain Rating: (0-10 scale) 0/10   Pain altered Tx:  [x] No  [] Yes  Action:    Comments: Pt reported she [place] the [place] and was just moving around and doing more activity than usual. Pt feels fatigued but denies any pain at this time. Pt inquired about progressing to land but is hesistant because aquatics have \"been so helpful.\"    Objective:    Modalities: N/A  Precautions: Fall Risk, S/P lumbar fusion    Shallow water enter/exit side of pool  Date 24      Low current on throughout session Low current on throughout session Low current on throughout session   Visit # 5         6 7 8 9   Walk F/L/R 4 laps     4 laps 4 laps  4 laps  4 laps   Marching X 20      X 20 X 20  X 20 X 20   Squats   Lt blue step  X 20 Lt blue step  X 20 Lt blue step  X 20   Step-Up and over, fwd and lat Orange Speedo step x 20 each    Orange  Speedo

## 2024-06-26 ENCOUNTER — HOSPITAL ENCOUNTER (OUTPATIENT)
Age: 64
Setting detail: THERAPIES SERIES
Discharge: HOME | End: 2024-06-26
Payer: MEDICARE

## 2024-06-26 PROCEDURE — 97113 AQUATIC THERAPY/EXERCISES: CPT

## 2024-07-01 ENCOUNTER — HOSPITAL ENCOUNTER (OUTPATIENT)
Age: 64
Setting detail: THERAPIES SERIES
Discharge: HOME | End: 2024-07-01
Payer: MEDICARE

## 2024-07-01 PROCEDURE — 97110 THERAPEUTIC EXERCISES: CPT

## 2024-07-01 NOTE — FLOWSHEET NOTE
[][facility] Rehabilitation &  Therapy  [address]  [place] [address]  [phone]  F: [phone] [x] [facility]  Outpatient Rehabilitation &  Therapy  [address]   P: [phone]  F: [phone]     Physical Therapy Daily Treatment Note    Date:  2024  Patient Name:  Nubia Jenkins    :  1960  MRN: [id number]  Physician: Daina Sultana MD                                Insurance: , MMO: BMN, 80/20%  Medical Diagnosis: Z98.1 (ICD-10-CM) - S/P lumbar fusion                       Rehab Codes: M25.60, Z74.09, M54.6, M54.59  Onset Date: 3/14/2024                      Next 's appt.: 2024     Visit# / total visits: ;                                                                             33Progress note for Medicare patient due at visit 10     Cancels/No Shows: 0/0      Subjective:  Pt amb without AD. No significant gait deficits, as previously stated. Pt to complete first \"land\" session of PT today.  Pain:  [x] Yes  [] No Location: bilateral low back stiffness/soreness Pain Rating: (0-10 scale) 0/10,  1/10 Max  Pain altered Tx:  [x] No  [] Yes  Action:    Comments: Pt denies any pain at this time. Pt stated she did \"a lot of walking this weekend\" and without symptoms, just fatigue.     Objective:  Pt obtained upright posture throughout standing exercises and does not appear guarded at trunk.       (24) MMT hip flex 4-/5 B LE, Quad 5/5, ankle 5/5, trunk ext 4+/5, trunk flex 4-/5. Trunk flexion 80% WFL and trunk ext WNL. AMBER score 2/50 - 4% functionally impaired   Modalities: N/A  Precautions: Fall Risk, S/P lumbar fusion    Shallow water enter/exit side of pool - DID NOT COMPLETE, BEGAN LAND BASED THERAPY 24  Date 24      Low current on throughout session Low current on throughout session Low current on throughout session Low current on throughout session

## 2024-07-03 ENCOUNTER — HOSPITAL ENCOUNTER (OUTPATIENT)
Age: 64
Setting detail: THERAPIES SERIES
Discharge: HOME | End: 2024-07-03
Payer: MEDICARE

## 2024-07-03 PROCEDURE — 97110 THERAPEUTIC EXERCISES: CPT

## 2024-07-03 NOTE — FLOWSHEET NOTE
[][facility] Rehabilitation &  Therapy  [address]  [facility] [place] [facility] [address]  [phone]  F: [phone] [x] [facility]  Outpatient Rehabilitation &  Therapy  [address]   P: [phone]  F: [phone]     Physical Therapy Daily Treatment Note    Date:  7/3/2024  Patient Name:  Nubia Jenkins    :  1960  MRN: [id number]  Physician: Daina Sultana MD                                Insurance: , MMO: BMN, 80/20%  Medical Diagnosis: Z98.1 (ICD-10-CM) - S/P lumbar fusion                       Rehab Codes: M25.60, Z74.09, M54.6, M54.59  Onset Date: 3/14/2024                      Next 's appt.: 2024     Visit# / total visits: ;                                                                             Progress note for Medicare patient due at visit 20     Cancels/No Shows: 0/0      Subjective:  Pt amb without AD. No significant gait deficits but amb with some antalgia from muscle soreness  Pain:  [x] Yes  [] No Location: bilateral low back stiffness/soreness Pain Rating: (0-10 scale) 0/10,  5/10 Max last night lying flat in bed  Pain altered Tx:  [x] No  [] Yes  Action:    Comments: Pt stated she did not have any adverse reaction to last therapy session other than muscle soreness, which she anticipated. Pt is also sore because she reports doing weeding and yard work yesterday.     Objective:  limited mobility with trunk rotation       (24) MMT hip flex 4-/5 B LE, Quad 5/5, ankle 5/5, trunk ext 4+/5, trunk flex 4-/5. Trunk flexion 80% WFL and trunk ext WNL. AMBER score 2/50 - 4% functionally impaired   Modalities: Ice post session 10'  Precautions: Fall Risk, S/P lumbar fusion  Start Land Based PT 24  Exercise  Level Reps Comments   Nustep  LVL 4      5'          Heel toe raises      X 20    Marches     X 20    Butt kicks     X 20     3 - way kicks      X 15          Step-ups    6\"    X 15   R/L         Wedge stretch  2nd

## 2024-07-15 ENCOUNTER — HOSPITAL ENCOUNTER (OUTPATIENT)
Age: 64
Setting detail: THERAPIES SERIES
Discharge: HOME | End: 2024-07-15
Payer: MEDICARE

## 2024-07-15 PROCEDURE — 97110 THERAPEUTIC EXERCISES: CPT

## 2024-07-15 NOTE — FLOWSHEET NOTE
R/L         Wedge stretch  2nd step    20\" x 4    Hamstring stretch  2nd step    20\" x 4                 T- Band        Shoulder ext  green     X 20    High ROW green     X 20    Mid ROW  green     X 20          SLS   firm    60\" x 2  1 finger tip  to 0 SBA   Tandem stance   firm    30\" x 2  SBA to CGA   Romberg stance foam with slows head turns  foam    60\"   SBA         TA isos   5\" x 15  Added 7/3   SKTC   20\" x 4  Added 7/3   LTR   20\" x 4  Within tolerable ROM                                                                                        Response to Treatment:  Implement stretches to reduce stiffness and pt reports \"it helped\". Inc resistance with t-band to progress strength and implemented romberg stance with headf turns on foam to progress stability.  O/10 pain post session  Assessment: [x] Progressing toward goals.     [] No change.     [] Other:  [x] Patient would continue to benefit from skilled physical therapy services in order to address deficits as stated to restore  mobility, dressing, donning LE's, retrieving from lower shelves, lifting > 15#, household chores and laundry are limited, cooking, barn chores    STG: (to be met in 10 treatments)  ? Pain: LBP < 1/10 to facilitate sleep -                                 Revised  ? ROM: Lumbar flexion to WFL to facilitate - Progressing, can do most activities but with stiffness and sometimes restricted  ? Strength: B LE B4+/5, core flexion 4+/5 to facilitate lifting-                                                              Revised  ? Function: Patient to Perform household chores independently at normal pace                                Revised  Demonstrate Knowledge of fall prevention -   LTG: (to be met in 24 treatments)  Improve AMBER score to 0% functionally impaired to facilitate performing household chores                                                   Revised  Patient to be independent with home exercise program as demonstrated by

## 2024-07-17 ENCOUNTER — HOSPITAL ENCOUNTER (OUTPATIENT)
Age: 64
Setting detail: THERAPIES SERIES
Discharge: HOME | End: 2024-07-17
Payer: MEDICARE

## 2024-07-17 PROCEDURE — 97110 THERAPEUTIC EXERCISES: CPT

## 2024-07-17 NOTE — FLOWSHEET NOTE
pt is confident and compliant  Comprehension of Education:  [x] Verbalizes understanding.  [x] Demonstrates understanding.  [] Needs Review.  [] Demonstrates/verbalizes understanding of HEP/Ed previously given.     Plan: [] Continue current frequency toward long and short term goals.    [x] Specific Instructions for subsequent treatments: Pt to discontinue PT sessions and continue with HEP, will call if needed in future            Frequency:  2 x/week for 24 visits         Treatment Charges: Mins Units   []  Modalities - Ice     [x]  Ther Exercise  40  3   []  Manual Therapy       []  Ther Activities       []  Aquatics     []  Neuromuscular       [] Vasocompression       [] Gait Training       []  Other       Total billable time 40 3       Time In: 6030 Time Out: 1015    Electronically signed by:  FRANKY KONG PTA

## 2024-07-22 ENCOUNTER — APPOINTMENT (OUTPATIENT)
Age: 64
End: 2024-07-22
Payer: MEDICARE

## 2024-07-25 ENCOUNTER — APPOINTMENT (OUTPATIENT)
Age: 64
End: 2024-07-25
Payer: MEDICARE

## 2024-07-29 ENCOUNTER — APPOINTMENT (OUTPATIENT)
Age: 64
End: 2024-07-29
Payer: MEDICARE

## 2024-08-16 DIAGNOSIS — N18.30 CONTROLLED TYPE 2 DIABETES MELLITUS WITH STAGE 3 CHRONIC KIDNEY DISEASE, WITHOUT LONG-TERM CURRENT USE OF INSULIN (HCC): ICD-10-CM

## 2024-08-16 DIAGNOSIS — E11.22 CONTROLLED TYPE 2 DIABETES MELLITUS WITH STAGE 3 CHRONIC KIDNEY DISEASE, WITHOUT LONG-TERM CURRENT USE OF INSULIN (HCC): ICD-10-CM

## 2024-08-16 DIAGNOSIS — K21.9 GASTROESOPHAGEAL REFLUX DISEASE, UNSPECIFIED WHETHER ESOPHAGITIS PRESENT: ICD-10-CM

## 2024-08-16 DIAGNOSIS — E83.42 HYPOMAGNESEMIA: ICD-10-CM

## 2024-08-16 DIAGNOSIS — E61.1 IRON DEFICIENCY: ICD-10-CM

## 2024-08-16 NOTE — TELEPHONE ENCOUNTER
Nubia Jenkins is calling to request a refill on the following medication(s):    Medication Request:  Requested Prescriptions     Pending Prescriptions Disp Refills    Magnesium Oxide -Mg Supplement 400 MG CAPS [Pharmacy Med Name: MAGNESIUM 400 MG SOFTGEL] 30 capsule      Sig: TAKE 1 CAPSULE BY MOUTH DAILY       Last Visit Date (If Applicable):  5/24/2024    Next Visit Date:    8/26/2024

## 2024-08-16 NOTE — TELEPHONE ENCOUNTER
LOV 5/24/24   RTO 8/26/24  LRF 4/8/24          Controlled Substance Monitoring:    Acute and Chronic Pain Monitoring:   RX Monitoring Attestation Periodic Controlled Substance Monitoring   8/7/2018   5:01 PM The Prescription Monitoring Report for this patient was reviewed today. No signs of potential drug abuse or diversion identified.

## 2024-08-16 NOTE — TELEPHONE ENCOUNTER
LOV 5/24/24  RTO 8/26/24  LRF 5/28/24, 4/8/24          Controlled Substance Monitoring:    Acute and Chronic Pain Monitoring:   RX Monitoring Attestation Periodic Controlled Substance Monitoring   8/7/2018   5:01 PM The Prescription Monitoring Report for this patient was reviewed today. No signs of potential drug abuse or diversion identified.

## 2024-08-19 RX ORDER — METFORMIN HYDROCHLORIDE 500 MG/1
500 TABLET, EXTENDED RELEASE ORAL
Qty: 90 TABLET | Refills: 1 | Status: SHIPPED | OUTPATIENT
Start: 2024-08-19 | End: 2025-08-19

## 2024-08-19 RX ORDER — OMEPRAZOLE 20 MG/1
20 CAPSULE, DELAYED RELEASE ORAL DAILY
Qty: 90 CAPSULE | Refills: 1 | Status: SHIPPED | OUTPATIENT
Start: 2024-08-19 | End: 2025-02-15

## 2024-08-19 RX ORDER — FERROUS SULFATE 325(65) MG
1 TABLET ORAL 2 TIMES DAILY
Qty: 180 TABLET | Refills: 1 | Status: SHIPPED | OUTPATIENT
Start: 2024-08-19 | End: 2025-08-19

## 2024-08-26 ENCOUNTER — TELEPHONE (OUTPATIENT)
Dept: GASTROENTEROLOGY | Age: 64
End: 2024-08-26

## 2024-08-26 ENCOUNTER — OFFICE VISIT (OUTPATIENT)
Dept: FAMILY MEDICINE CLINIC | Age: 64
End: 2024-08-26
Payer: MEDICARE

## 2024-08-26 VITALS
OXYGEN SATURATION: 96 % | RESPIRATION RATE: 16 BRPM | HEART RATE: 68 BPM | WEIGHT: 158.8 LBS | DIASTOLIC BLOOD PRESSURE: 84 MMHG | BODY MASS INDEX: 29.04 KG/M2 | SYSTOLIC BLOOD PRESSURE: 110 MMHG | TEMPERATURE: 97.1 F

## 2024-08-26 DIAGNOSIS — F33.1 MODERATE EPISODE OF RECURRENT MAJOR DEPRESSIVE DISORDER (HCC): ICD-10-CM

## 2024-08-26 DIAGNOSIS — I51.89 DIASTOLIC DYSFUNCTION: ICD-10-CM

## 2024-08-26 DIAGNOSIS — F51.04 PSYCHOPHYSIOLOGICAL INSOMNIA: ICD-10-CM

## 2024-08-26 DIAGNOSIS — E83.42 HYPOMAGNESEMIA: ICD-10-CM

## 2024-08-26 DIAGNOSIS — R51.9 NONINTRACTABLE HEADACHE, UNSPECIFIED CHRONICITY PATTERN, UNSPECIFIED HEADACHE TYPE: ICD-10-CM

## 2024-08-26 DIAGNOSIS — E61.1 IRON DEFICIENCY: ICD-10-CM

## 2024-08-26 DIAGNOSIS — R41.3 MEMORY CHANGE: ICD-10-CM

## 2024-08-26 DIAGNOSIS — Z12.11 SCREENING FOR COLORECTAL CANCER: ICD-10-CM

## 2024-08-26 DIAGNOSIS — G47.33 OSA (OBSTRUCTIVE SLEEP APNEA): ICD-10-CM

## 2024-08-26 DIAGNOSIS — I10 ESSENTIAL HYPERTENSION: Primary | ICD-10-CM

## 2024-08-26 DIAGNOSIS — Z12.12 SCREENING FOR COLORECTAL CANCER: ICD-10-CM

## 2024-08-26 DIAGNOSIS — Z12.11 COLON CANCER SCREENING: Primary | ICD-10-CM

## 2024-08-26 PROCEDURE — 99214 OFFICE O/P EST MOD 30 MIN: CPT | Performed by: NURSE PRACTITIONER

## 2024-08-26 PROCEDURE — 1090F PRES/ABSN URINE INCON ASSESS: CPT | Performed by: NURSE PRACTITIONER

## 2024-08-26 PROCEDURE — G2211 COMPLEX E/M VISIT ADD ON: HCPCS | Performed by: NURSE PRACTITIONER

## 2024-08-26 PROCEDURE — 1123F ACP DISCUSS/DSCN MKR DOCD: CPT | Performed by: NURSE PRACTITIONER

## 2024-08-26 PROCEDURE — G8427 DOCREV CUR MEDS BY ELIG CLIN: HCPCS | Performed by: NURSE PRACTITIONER

## 2024-08-26 PROCEDURE — 3074F SYST BP LT 130 MM HG: CPT | Performed by: NURSE PRACTITIONER

## 2024-08-26 PROCEDURE — 3017F COLORECTAL CA SCREEN DOC REV: CPT | Performed by: NURSE PRACTITIONER

## 2024-08-26 PROCEDURE — 3079F DIAST BP 80-89 MM HG: CPT | Performed by: NURSE PRACTITIONER

## 2024-08-26 PROCEDURE — G8417 CALC BMI ABV UP PARAM F/U: HCPCS | Performed by: NURSE PRACTITIONER

## 2024-08-26 PROCEDURE — 1036F TOBACCO NON-USER: CPT | Performed by: NURSE PRACTITIONER

## 2024-08-26 PROCEDURE — G8400 PT W/DXA NO RESULTS DOC: HCPCS | Performed by: NURSE PRACTITIONER

## 2024-08-26 RX ORDER — FERROUS SULFATE 325(65) MG
1 TABLET ORAL 2 TIMES DAILY
Status: SHIPPED | COMMUNITY
Start: 2024-08-26 | End: 2025-08-26

## 2024-08-26 RX ORDER — LISINOPRIL 40 MG/1
40 TABLET ORAL DAILY
Qty: 90 TABLET | Refills: 1 | Status: SHIPPED | OUTPATIENT
Start: 2024-08-26 | End: 2025-08-26

## 2024-08-26 RX ORDER — DULOXETIN HYDROCHLORIDE 60 MG/1
CAPSULE, DELAYED RELEASE ORAL
Qty: 180 CAPSULE | Refills: 1 | Status: SHIPPED | OUTPATIENT
Start: 2024-08-26

## 2024-08-26 RX ORDER — TRAZODONE HYDROCHLORIDE 100 MG/1
100 TABLET ORAL NIGHTLY
Qty: 90 TABLET | Refills: 1 | Status: SHIPPED | OUTPATIENT
Start: 2024-08-26 | End: 2025-08-26

## 2024-08-26 RX ORDER — HYDROCHLOROTHIAZIDE 12.5 MG/1
12.5 TABLET ORAL DAILY
Qty: 90 TABLET | Refills: 1 | Status: SHIPPED | OUTPATIENT
Start: 2024-08-26 | End: 2025-08-26

## 2024-08-26 RX ORDER — POLYETHYLENE GLYCOL 3350, SODIUM CHLORIDE, SODIUM BICARBONATE, POTASSIUM CHLORIDE 420; 11.2; 5.72; 1.48 G/4L; G/4L; G/4L; G/4L
POWDER, FOR SOLUTION ORAL
Qty: 1 EACH | Refills: 0 | Status: SHIPPED | OUTPATIENT
Start: 2024-08-26

## 2024-08-26 ASSESSMENT — ENCOUNTER SYMPTOMS
NAUSEA: 0
WHEEZING: 0
CHEST TIGHTNESS: 0
COUGH: 0
ROS SKIN COMMENTS: FOLLOWS WITH DERMATOLOGY
BACK PAIN: 0
DIARRHEA: 0
ABDOMINAL DISTENTION: 0
ABDOMINAL PAIN: 0
CONSTIPATION: 0
SHORTNESS OF BREATH: 0

## 2024-08-26 NOTE — PROGRESS NOTES
[facility]   [address] [facility] [address]  [phone]    8/26/24     Patient ID  Nubia Jenkins is a 64 y.o. female  Established patient    Chief Complaint  Nubia Jenkins presents today for Hypertension, Diabetes, and Depression    Have you seen any other physician or provider since your last visit? Yes - Records Obtained Physical Therapy- Completed, Neurosurgery, Cardiothoracic  Have you had any other diagnostic tests since your last visit? Yes - Records Obtained  Have you been seen in the emergency room and/or had an admission to a hospital since we last saw you? No     ASSESSMENT/PLAN  1. Essential hypertension  -     hydroCHLOROthiazide 12.5 MG tablet; Take 1 tablet by mouth daily, Disp-90 tablet, R-1Normal  -     lisinopril (PRINIVIL;ZESTRIL) 40 MG tablet; Take 1 tablet by mouth daily, Disp-90 tablet, R-1Normal  2. Moderate episode of recurrent major depressive disorder (HCC)  -     DULoxetine (CYMBALTA) 60 MG extended release capsule; TAKE 1 CAPSULE BY MOUTH TWICE A DAY, Disp-180 capsule, R-1Normal  -     traZODone (DESYREL) 100 MG tablet; Take 1 tablet by mouth nightly, Disp-90 tablet, R-1Normal  3. Diastolic dysfunction  -     hydroCHLOROthiazide 12.5 MG tablet; Take 1 tablet by mouth daily, Disp-90 tablet, R-1Normal  4. Iron deficiency  -     ferrous sulfate (FEROSUL) 325 (65 Fe) MG tablet; Take 1 tablet by mouth 2 times dailyOTC  5. Hypomagnesemia  -     Magnesium Oxide -Mg Supplement 400 MG CAPS; Take 1 capsule by mouth dailyOTC  6. Psychophysiological insomnia  -     traZODone (DESYREL) 100 MG tablet; Take 1 tablet by mouth nightly, Disp-90 tablet, R-1Normal  7. Screening for colorectal cancer  -     Aj Nova MD, Gastroenterology, [place]  8. ARNIE (obstructive sleep apnea)  -     MRI BRAIN W WO CONTRAST; Future  9. Memory change  -     MRI BRAIN W WO CONTRAST; Future  10. Nonintractable headache, unspecified chronicity pattern, unspecified headache  well-controlled.  All questions and concerns concerns answered    Review of Systems  Review of Systems   Constitutional:  Positive for fatigue. Negative for activity change, appetite change, chills and fever.   Respiratory:  Negative for cough, chest tightness, shortness of breath and wheezing.         ARNIE, no CPAP, wears mouth guard    Cardiovascular:  Negative for chest pain, palpitations and leg swelling.        Now following with cardiology    Gastrointestinal:  Negative for abdominal distention, abdominal pain (discomforts), constipation, diarrhea and nausea.        Hx Diverticulosis  GERD sxs    Endocrine:        Has not been checking glucose levels    Genitourinary:  Negative for difficulty urinating, hematuria and urgency.   Musculoskeletal:  Negative for arthralgias, back pain (chronic ), gait problem and joint swelling.        Since surgery no longer with left hip or knee pain   Completed PT    Skin:  Negative for rash and wound.        Follows with dermatology   Allergic/Immunologic: Positive for environmental allergies. Negative for food allergies.   Neurological:  Positive for headaches. Negative for dizziness, syncope, weakness, light-headedness and numbness.        No falls    Psychiatric/Behavioral:  Negative for agitation, confusion (still with memory concerns), decreased concentration, dysphoric mood, self-injury, sleep disturbance and suicidal ideas. The patient is not nervous/anxious.        Physical exam   Physical Exam  Vitals and nursing note reviewed.   Constitutional:       General: She is not in acute distress.     Appearance: Normal appearance. She is well-developed, well-groomed and overweight. She is not ill-appearing or toxic-appearing.   Cardiovascular:      Rate and Rhythm: Normal rate and regular rhythm. No extrasystoles are present.     Heart sounds: Normal heart sounds, S1 normal and S2 normal. No murmur heard.  Pulmonary:      Effort: Pulmonary effort is normal. No prolonged

## 2024-08-26 NOTE — TELEPHONE ENCOUNTER
Attempt 1, writer spoke with pt to schedule colonoscopy/Jeronimo. Pt states she does not take blood thinners. Pt would like procedure at [facility].    Procedure scheduled/Dr Decker  Procedure: Colonoscopy (WQ/Screening)  Dx:  Screening for colorectal cancer  Date: Wednesday 11/06/24  Time: 9:00 am/Arrive at 7:30 am  Hospital: [facility]; Entrance C  PAT Phone Call: N/A  Bowel Prep instructions given: Nuanne-marie Bowel Prep  In office/via phone: via phone  Clearance needed: N/A    Pt informed it is required they must have a /responsible adult that takes them to their procedure, stays at the hospital (before, during, and after procedure), and drives pt home. Pt informed /responsible adult must stay inside the hospital during their procedure. Pt voiced understanding of  protocol for procedure.

## 2024-09-16 ENCOUNTER — HOSPITAL ENCOUNTER (OUTPATIENT)
Dept: MRI IMAGING | Age: 64
Discharge: HOME | End: 2024-09-18
Payer: MEDICARE

## 2024-09-16 DIAGNOSIS — R51.9 NONINTRACTABLE HEADACHE, UNSPECIFIED CHRONICITY PATTERN, UNSPECIFIED HEADACHE TYPE: ICD-10-CM

## 2024-09-16 DIAGNOSIS — G47.33 OSA (OBSTRUCTIVE SLEEP APNEA): ICD-10-CM

## 2024-09-16 DIAGNOSIS — R41.3 MEMORY CHANGE: ICD-10-CM

## 2024-09-16 LAB
CREAT SERPL-MCNC: 0.9 MG/DL (ref 0.5–0.9)
GFR, ESTIMATED: 71 ML/MIN/1.73M2

## 2024-09-16 PROCEDURE — 2580000003 HC RX 258: Performed by: NURSE PRACTITIONER

## 2024-09-16 PROCEDURE — 6360000004 HC RX CONTRAST MEDICATION: Performed by: NURSE PRACTITIONER

## 2024-09-16 PROCEDURE — 82565 ASSAY OF CREATININE: CPT

## 2024-09-16 PROCEDURE — 36415 COLL VENOUS BLD VENIPUNCTURE: CPT

## 2024-09-16 PROCEDURE — A9579 GAD-BASE MR CONTRAST NOS,1ML: HCPCS | Performed by: NURSE PRACTITIONER

## 2024-09-16 PROCEDURE — 70553 MRI BRAIN STEM W/O & W/DYE: CPT

## 2024-09-16 RX ORDER — SODIUM CHLORIDE 0.9 % (FLUSH) 0.9 %
10 SYRINGE (ML) INJECTION PRN
Status: DISCONTINUED | OUTPATIENT
Start: 2024-09-16 | End: 2024-09-19 | Stop reason: HOSPADM

## 2024-09-16 RX ADMIN — GADOTERIDOL 14 ML: 279.3 INJECTION, SOLUTION INTRAVENOUS at 07:51

## 2024-09-16 RX ADMIN — SODIUM CHLORIDE, PRESERVATIVE FREE 10 ML: 5 INJECTION INTRAVENOUS at 07:31

## 2024-10-10 DIAGNOSIS — E78.00 PURE HYPERCHOLESTEROLEMIA: ICD-10-CM

## 2024-10-11 RX ORDER — ATORVASTATIN CALCIUM 80 MG/1
80 TABLET, FILM COATED ORAL DAILY
Qty: 90 TABLET | Refills: 1 | Status: SHIPPED | OUTPATIENT
Start: 2024-10-11

## 2024-10-11 NOTE — TELEPHONE ENCOUNTER
Pham Shoemaker, APRN - CNP [facility] DEP   6/17/2026  9:00 AM Segun Mcdonnell MD [id number]            Patient Active Problem List:     Diabetes mellitus 2 with renal manifestations, controlled     Lumbar disc herniation with radiculopathy     Hyperlipidemia     Essential hypertension     Persistent depressive disorder with anxious distress     Peripheral neuropathy     ARNIE (obstructive sleep apnea)     TMJ arthralgia     GERD (gastroesophageal reflux disease)     Acute pain of left hip     Thoracic disc herniation     Unilateral primary osteoarthritis, left hip     Unilateral primary osteoarthritis, left knee     Anxiety     Abnormal ECG     Dilated aortic root (HCC)     Heart palpitations     Right bundle branch block     SOB (shortness of breath) on exertion     Moderate episode of recurrent major depressive disorder (HCC)     History of lumbar fusion     Weakness     Herniated lumbar intervertebral disc     Lumbar radiculopathy     Spinal stenosis of lumbar region

## 2024-10-14 DIAGNOSIS — Z12.11 COLON CANCER SCREENING: ICD-10-CM

## 2024-10-14 RX ORDER — POLYETHYLENE GLYCOL 3350, SODIUM CHLORIDE, SODIUM BICARBONATE, POTASSIUM CHLORIDE 420; 11.2; 5.72; 1.48 G/4L; G/4L; G/4L; G/4L
POWDER, FOR SOLUTION ORAL
Qty: 1 EACH | Refills: 0 | Status: SHIPPED | OUTPATIENT
Start: 2024-10-14

## 2024-10-29 NOTE — DISCHARGE INSTRUCTIONS

## 2024-11-04 NOTE — PROGRESS NOTES
PAT phone call for colonoscopy completed with pt.    Date/time/location (entrance C) of surgery/procedure verified with pt.    NPO after MN status verified with pt.    Need for  ( x  )  verified with pt.    Verified need to complete bowel prep/instructions per     Instructed pt to take a shower the AM of surgery/procedure and to avoid lotions, creams, jewelry.    Instructed pt to take BP meds , and Omeprazole with a small sip of water prior to procedure/surgery.

## 2024-11-05 ENCOUNTER — ANESTHESIA EVENT (OUTPATIENT)
Dept: OPERATING ROOM | Age: 64
End: 2024-11-05
Payer: MEDICARE

## 2024-11-06 ENCOUNTER — ANESTHESIA (OUTPATIENT)
Dept: OPERATING ROOM | Age: 64
End: 2024-11-06
Payer: MEDICARE

## 2024-11-06 ENCOUNTER — HOSPITAL ENCOUNTER (OUTPATIENT)
Age: 64
Setting detail: OUTPATIENT SURGERY
Discharge: HOME | End: 2024-11-06
Attending: INTERNAL MEDICINE | Admitting: INTERNAL MEDICINE
Payer: MEDICARE

## 2024-11-06 VITALS
WEIGHT: 161.2 LBS | TEMPERATURE: 97.3 F | OXYGEN SATURATION: 100 % | BODY MASS INDEX: 28.56 KG/M2 | HEIGHT: 63 IN | DIASTOLIC BLOOD PRESSURE: 76 MMHG | RESPIRATION RATE: 11 BRPM | SYSTOLIC BLOOD PRESSURE: 126 MMHG | HEART RATE: 61 BPM

## 2024-11-06 DIAGNOSIS — Z12.11 SCREEN FOR COLON CANCER: ICD-10-CM

## 2024-11-06 LAB — GLUCOSE BLD-MCNC: 124 MG/DL (ref 65–105)

## 2024-11-06 PROCEDURE — 7100000011 HC PHASE II RECOVERY - ADDTL 15 MIN: Performed by: INTERNAL MEDICINE

## 2024-11-06 PROCEDURE — 45385 COLONOSCOPY W/LESION REMOVAL: CPT | Performed by: INTERNAL MEDICINE

## 2024-11-06 PROCEDURE — 3609010600 HC COLONOSCOPY POLYPECTOMY SNARE/COLD BIOPSY: Performed by: INTERNAL MEDICINE

## 2024-11-06 PROCEDURE — 7100000000 HC PACU RECOVERY - FIRST 15 MIN: Performed by: INTERNAL MEDICINE

## 2024-11-06 PROCEDURE — 7100000010 HC PHASE II RECOVERY - FIRST 15 MIN: Performed by: INTERNAL MEDICINE

## 2024-11-06 PROCEDURE — 3700000000 HC ANESTHESIA ATTENDED CARE: Performed by: INTERNAL MEDICINE

## 2024-11-06 PROCEDURE — 45380 COLONOSCOPY AND BIOPSY: CPT | Performed by: INTERNAL MEDICINE

## 2024-11-06 PROCEDURE — 2709999900 HC NON-CHARGEABLE SUPPLY: Performed by: INTERNAL MEDICINE

## 2024-11-06 PROCEDURE — 2580000003 HC RX 258: Performed by: ANESTHESIOLOGY

## 2024-11-06 PROCEDURE — 88305 TISSUE EXAM BY PATHOLOGIST: CPT

## 2024-11-06 PROCEDURE — 82947 ASSAY GLUCOSE BLOOD QUANT: CPT

## 2024-11-06 PROCEDURE — 3700000001 HC ADD 15 MINUTES (ANESTHESIA): Performed by: INTERNAL MEDICINE

## 2024-11-06 PROCEDURE — 6360000002 HC RX W HCPCS: Performed by: NURSE ANESTHETIST, CERTIFIED REGISTERED

## 2024-11-06 PROCEDURE — 2500000003 HC RX 250 WO HCPCS: Performed by: NURSE ANESTHETIST, CERTIFIED REGISTERED

## 2024-11-06 PROCEDURE — 7100000001 HC PACU RECOVERY - ADDTL 15 MIN: Performed by: INTERNAL MEDICINE

## 2024-11-06 RX ORDER — SODIUM CHLORIDE 0.9 % (FLUSH) 0.9 %
5-40 SYRINGE (ML) INJECTION PRN
Status: DISCONTINUED | OUTPATIENT
Start: 2024-11-06 | End: 2024-11-06 | Stop reason: HOSPADM

## 2024-11-06 RX ORDER — SODIUM CHLORIDE 0.9 % (FLUSH) 0.9 %
5-40 SYRINGE (ML) INJECTION EVERY 12 HOURS SCHEDULED
Status: DISCONTINUED | OUTPATIENT
Start: 2024-11-06 | End: 2024-11-06 | Stop reason: HOSPADM

## 2024-11-06 RX ORDER — DEXMEDETOMIDINE HYDROCHLORIDE 100 UG/ML
INJECTION, SOLUTION INTRAVENOUS
Status: DISCONTINUED | OUTPATIENT
Start: 2024-11-06 | End: 2024-11-06 | Stop reason: SDUPTHER

## 2024-11-06 RX ORDER — SODIUM CHLORIDE 9 MG/ML
INJECTION, SOLUTION INTRAVENOUS CONTINUOUS
Status: DISCONTINUED | OUTPATIENT
Start: 2024-11-06 | End: 2024-11-06 | Stop reason: HOSPADM

## 2024-11-06 RX ORDER — SODIUM CHLORIDE 9 MG/ML
INJECTION, SOLUTION INTRAVENOUS PRN
Status: DISCONTINUED | OUTPATIENT
Start: 2024-11-06 | End: 2024-11-06 | Stop reason: HOSPADM

## 2024-11-06 RX ORDER — PROPOFOL 10 MG/ML
INJECTION, EMULSION INTRAVENOUS
Status: DISCONTINUED | OUTPATIENT
Start: 2024-11-06 | End: 2024-11-06 | Stop reason: SDUPTHER

## 2024-11-06 RX ORDER — NALOXONE HYDROCHLORIDE 0.4 MG/ML
INJECTION, SOLUTION INTRAMUSCULAR; INTRAVENOUS; SUBCUTANEOUS PRN
Status: DISCONTINUED | OUTPATIENT
Start: 2024-11-06 | End: 2024-11-06 | Stop reason: HOSPADM

## 2024-11-06 RX ORDER — SODIUM CHLORIDE, SODIUM LACTATE, POTASSIUM CHLORIDE, CALCIUM CHLORIDE 600; 310; 30; 20 MG/100ML; MG/100ML; MG/100ML; MG/100ML
INJECTION, SOLUTION INTRAVENOUS CONTINUOUS
Status: DISCONTINUED | OUTPATIENT
Start: 2024-11-06 | End: 2024-11-06 | Stop reason: HOSPADM

## 2024-11-06 RX ORDER — LIDOCAINE HYDROCHLORIDE 10 MG/ML
INJECTION, SOLUTION EPIDURAL; INFILTRATION; INTRACAUDAL; PERINEURAL
Status: DISCONTINUED | OUTPATIENT
Start: 2024-11-06 | End: 2024-11-06 | Stop reason: SDUPTHER

## 2024-11-06 RX ORDER — LABETALOL HYDROCHLORIDE 5 MG/ML
10 INJECTION, SOLUTION INTRAVENOUS
Status: DISCONTINUED | OUTPATIENT
Start: 2024-11-06 | End: 2024-11-06 | Stop reason: HOSPADM

## 2024-11-06 RX ORDER — HYDRALAZINE HYDROCHLORIDE 20 MG/ML
10 INJECTION INTRAMUSCULAR; INTRAVENOUS
Status: DISCONTINUED | OUTPATIENT
Start: 2024-11-06 | End: 2024-11-06 | Stop reason: HOSPADM

## 2024-11-06 RX ORDER — PHENYLEPHRINE HCL IN 0.9% NACL 1 MG/10 ML
SYRINGE (ML) INTRAVENOUS
Status: DISCONTINUED | OUTPATIENT
Start: 2024-11-06 | End: 2024-11-06 | Stop reason: SDUPTHER

## 2024-11-06 RX ORDER — PROCHLORPERAZINE EDISYLATE 5 MG/ML
5 INJECTION INTRAMUSCULAR; INTRAVENOUS
Status: DISCONTINUED | OUTPATIENT
Start: 2024-11-06 | End: 2024-11-06 | Stop reason: HOSPADM

## 2024-11-06 RX ADMIN — PROPOFOL 50 MG: 10 INJECTION, EMULSION INTRAVENOUS at 09:13

## 2024-11-06 RX ADMIN — DEXMEDETOMIDINE HCL 8 MCG: 100 INJECTION INTRAVENOUS at 09:13

## 2024-11-06 RX ADMIN — Medication 200 MCG: at 09:33

## 2024-11-06 RX ADMIN — LIDOCAINE HYDROCHLORIDE 50 MG: 10 INJECTION, SOLUTION EPIDURAL; INFILTRATION; INTRACAUDAL; PERINEURAL at 09:13

## 2024-11-06 RX ADMIN — SODIUM CHLORIDE, PRESERVATIVE FREE 10 ML: 5 INJECTION INTRAVENOUS at 08:13

## 2024-11-06 RX ADMIN — PROPOFOL 140 MCG/KG/MIN: 10 INJECTION, EMULSION INTRAVENOUS at 09:13

## 2024-11-06 RX ADMIN — PROPOFOL 50 MG: 10 INJECTION, EMULSION INTRAVENOUS at 09:18

## 2024-11-06 ASSESSMENT — PAIN DESCRIPTION - DESCRIPTORS: DESCRIPTORS: OTHER (COMMENT)

## 2024-11-06 ASSESSMENT — PAIN - FUNCTIONAL ASSESSMENT
PAIN_FUNCTIONAL_ASSESSMENT: PREVENTS OR INTERFERES SOME ACTIVE ACTIVITIES AND ADLS
PAIN_FUNCTIONAL_ASSESSMENT: FACE, LEGS, ACTIVITY, CRY, AND CONSOLABILITY (FLACC)
PAIN_FUNCTIONAL_ASSESSMENT: 0-10

## 2024-11-06 NOTE — ANESTHESIA POSTPROCEDURE EVALUATION
Department of Anesthesiology  Postprocedure Note    Patient: Nubia Jenkins  MRN: [id number]  YOB: 1960  Date of evaluation: 11/6/2024    Procedure Summary       Date: 11/06/24 Room / Location: [facility] PROCEDURE ROOM / [facility]    Anesthesia Start: 0912 Anesthesia Stop: 0942    Procedure: COLONOSCOPY POLYPECTOMY COLD SNARE/BIOPSY Diagnosis:       Screen for colon cancer      (Screen for colon cancer [Z12.11])    Surgeons: Aj Decker MD Responsible Provider: Hari Barboza MD    Anesthesia Type: TIVA ASA Status: 3            Anesthesia Type: No value filed.    Cristy Phase I: Cristy Score: 4    Cristy Phase II:      Anesthesia Post Evaluation    Patient location during evaluation: PACU  Patient participation: complete - patient participated  Level of consciousness: awake and awake and alert  Pain score: 0  Nausea & Vomiting: no nausea and no vomiting  Cardiovascular status: hemodynamically stable and blood pressure returned to baseline  Respiratory status: acceptable  Hydration status: euvolemic  Multimodal analgesia pain management approach  Pain management: adequate    No notable events documented.

## 2024-11-06 NOTE — H&P
Procedure History and Physical    Pre-Procedural Diagnosis:  CRC screening    Indications:  same    Procedure Planned: colonoscopy     History Obtained From:  patient    HISTORY OF PRESENT ILLNESS:       The patient is a 64 y.o. female who presents for the above procedure.        Past Medical History:    Past Medical History:   Diagnosis Date    Benign paroxysmal vertigo     CKD (chronic kidney disease) stage 3, GFR 30-59 ml/min ([facility]) 2015    Diabetes mellitus, type 2 ([facility])     Diverticulosis of colon     DVT of lower extremity (deep venous thrombosis) ([facility]) 2011    after lumbar surgery    Esophageal reflux     Hyperlipidemia     Hypertension     Lumbar disc herniation with radiculopathy     Rotator cuff tear     right       Past Surgical History:    Past Surgical History:   Procedure Laterality Date     SECTION  ,     CHOLECYSTECTOMY      COLONOSCOPY      GALLBLADDER SURGERY      HYSTERECTOMY (CERVIX STATUS UNKNOWN)      LUMBAR DISCECTOMY           LUMBAR FUSION      ROTATOR CUFF REPAIR      Right    SPINAL FUSION      THOMPSON AND BSO (CERVIX REMOVED)         Medications:  Current Facility-Administered Medications   Medication Dose Route Frequency Provider Last Rate Last Admin    0.9 % sodium chloride infusion   IntraVENous Continuous Radha Fair MD        lactated ringers infusion   IntraVENous Continuous Radha Fair MD        sodium chloride flush 0.9 % injection 5-40 mL  5-40 mL IntraVENous 2 times per day Radha Fair MD        sodium chloride flush 0.9 % injection 5-40 mL  5-40 mL IntraVENous PRN Radha Fair MD        0.9 % sodium chloride infusion   IntraVENous PRN Radha Fair MD           Allergies:   Allergies   Allergen Reactions    Adhesive Tape Other (See Comments)     Blistering and skin lesions     Demerol [Meperidine Hcl] Other (See Comments)     Hallucination     Penicillin G Potassium Nausea Only                 Social   Social History     Tobacco Use    Smoking

## 2024-11-06 NOTE — OP NOTE
Colonoscopy report    Colonoscopy Procedure Note    Procedure:  Colonoscopy with polypectomy with snare and biopsy forceps    Procedure Date: 11/6/2024    Indications: Colon cancer screening, average risk, last colonoscopy was incomplete.    Sedation:  MAC    Attending Physician:  Dr. Aj Decker MD.     Assistant Physician: None    Consent:   Informed consent was obtained for the procedure after explaining the risks including bleeding, perforation, aspiration, arrhythmia, risks related to sedation, reaction to medications and rarely death, benefits and alternatives to the patient. The patient verbalized understanding and agreed to proceed with the procedure.       Procedure Details:  The patient was placed in the left lateral decubitus position.  Oxygen and cardiac monitoring equipment was attached. The patient's vital signs were monitored continuously  throughout the procedure. After appropriate sedation was achieved, a rectal examination was performed.  The pediatric colonoscopy was inserted into the rectum and advanced under direct vision to the cecum which was identified by ileocecal valve and appendiceal orifice.  The quality of the colonic preparation was fair.  A careful inspection was made as the colonoscope was withdrawn, including a retroflexed view of the rectum; findings and interventions are described below.  Appropriate photodocumentation was obtained.           Complications:  None    Estimated blood loss:  Minimal           Disposition:  Home           Condition: stable    Withdrawal Time: 21 minutes    Findings:   The bowel prep was fair.  The advancement of scope through the sigmoid and descending colon was somewhat challenging due to severe diverticular disease as well as colonic redundancy.  Successful intubation and advancement to cecum achieved with loop reduction, water immersion and gentle pressure.  3 mm polyp noted in the cecum that was removed with cold snare.  3 mm polyp noted in the

## 2024-11-06 NOTE — ANESTHESIA PRE PROCEDURE
Yes Colleen Loya, JORDAN - LARISSA   Handicap Placard MISC by Does not apply route Exp 2/12/2029 2/12/24   Pahm Shoemaker APRN - CNP   blood glucose monitor strips 100 strips by Other route 2 times daily as needed for Other (check glucose) Test 4 times a day & as needed for symptoms of irregular blood glucose. Dispense sufficient amount for indicated testing frequency plus additional to accommodate PRN testing needs. 4/7/23 8/26/25  Pham Shoemaker, APRN - CNP       Current medications:    Current Facility-Administered Medications   Medication Dose Route Frequency Provider Last Rate Last Admin    0.9 % sodium chloride infusion   IntraVENous Continuous Radha Fair MD        lactated ringers infusion   IntraVENous Continuous Radha Fair MD        sodium chloride flush 0.9 % injection 5-40 mL  5-40 mL IntraVENous 2 times per day Radha Fair MD   10 mL at 11/06/24 0813    sodium chloride flush 0.9 % injection 5-40 mL  5-40 mL IntraVENous PRN Radha Fair MD        0.9 % sodium chloride infusion   IntraVENous PRN Radha Fair MD           Allergies:    Allergies   Allergen Reactions    Adhesive Tape Other (See Comments)     Blistering and skin lesions     Demerol [Meperidine Hcl] Other (See Comments)     Hallucination     Penicillin G Potassium Nausea Only       Problem List:    Patient Active Problem List   Diagnosis Code    Diabetes mellitus 2 with renal manifestations, controlled E11.29    Lumbar disc herniation with radiculopathy M51.16    Hyperlipidemia E78.5    Essential hypertension I10    Persistent depressive disorder with anxious distress F34.1    Peripheral neuropathy G62.9    ARNIE (obstructive sleep apnea) G47.33    TMJ arthralgia M26.629    GERD (gastroesophageal reflux disease) K21.9    Acute pain of left hip M25.552    Thoracic disc herniation M51.24    Unilateral primary osteoarthritis, left hip M16.12    Unilateral primary osteoarthritis, left knee M17.12    Anxiety F41.9    Abnormal ECG R94.31    Dilated aortic

## 2024-11-08 LAB — SURGICAL PATHOLOGY REPORT: NORMAL

## 2025-02-23 SDOH — ECONOMIC STABILITY: INCOME INSECURITY: IN THE LAST 12 MONTHS, WAS THERE A TIME WHEN YOU WERE NOT ABLE TO PAY THE MORTGAGE OR RENT ON TIME?: NO

## 2025-02-23 SDOH — ECONOMIC STABILITY: FOOD INSECURITY: WITHIN THE PAST 12 MONTHS, YOU WORRIED THAT YOUR FOOD WOULD RUN OUT BEFORE YOU GOT MONEY TO BUY MORE.: NEVER TRUE

## 2025-02-23 SDOH — ECONOMIC STABILITY: FOOD INSECURITY: WITHIN THE PAST 12 MONTHS, THE FOOD YOU BOUGHT JUST DIDN'T LAST AND YOU DIDN'T HAVE MONEY TO GET MORE.: NEVER TRUE

## 2025-02-23 SDOH — ECONOMIC STABILITY: TRANSPORTATION INSECURITY
IN THE PAST 12 MONTHS, HAS THE LACK OF TRANSPORTATION KEPT YOU FROM MEDICAL APPOINTMENTS OR FROM GETTING MEDICATIONS?: NO

## 2025-02-23 SDOH — HEALTH STABILITY: PHYSICAL HEALTH: ON AVERAGE, HOW MANY MINUTES DO YOU ENGAGE IN EXERCISE AT THIS LEVEL?: 30 MIN

## 2025-02-23 SDOH — HEALTH STABILITY: PHYSICAL HEALTH: ON AVERAGE, HOW MANY DAYS PER WEEK DO YOU ENGAGE IN MODERATE TO STRENUOUS EXERCISE (LIKE A BRISK WALK)?: 3 DAYS

## 2025-02-23 ASSESSMENT — LIFESTYLE VARIABLES
HOW MANY STANDARD DRINKS CONTAINING ALCOHOL DO YOU HAVE ON A TYPICAL DAY: 1 OR 2
HOW OFTEN DO YOU HAVE A DRINK CONTAINING ALCOHOL: MONTHLY OR LESS
HOW OFTEN DO YOU HAVE A DRINK CONTAINING ALCOHOL: 2
HOW OFTEN DO YOU HAVE SIX OR MORE DRINKS ON ONE OCCASION: 1
HOW MANY STANDARD DRINKS CONTAINING ALCOHOL DO YOU HAVE ON A TYPICAL DAY: 1

## 2025-02-23 ASSESSMENT — PATIENT HEALTH QUESTIONNAIRE - PHQ9
1. LITTLE INTEREST OR PLEASURE IN DOING THINGS: SEVERAL DAYS
SUM OF ALL RESPONSES TO PHQ QUESTIONS 1-9: 2
SUM OF ALL RESPONSES TO PHQ9 QUESTIONS 1 & 2: 2
2. FEELING DOWN, DEPRESSED OR HOPELESS: SEVERAL DAYS
SUM OF ALL RESPONSES TO PHQ QUESTIONS 1-9: 2

## 2025-02-26 ENCOUNTER — OFFICE VISIT (OUTPATIENT)
Dept: FAMILY MEDICINE CLINIC | Age: 65
End: 2025-02-26

## 2025-02-26 VITALS
WEIGHT: 160.4 LBS | DIASTOLIC BLOOD PRESSURE: 64 MMHG | HEIGHT: 63 IN | OXYGEN SATURATION: 96 % | TEMPERATURE: 97.3 F | HEART RATE: 80 BPM | BODY MASS INDEX: 28.42 KG/M2 | SYSTOLIC BLOOD PRESSURE: 100 MMHG | RESPIRATION RATE: 12 BRPM

## 2025-02-26 DIAGNOSIS — Z00.00 INITIAL MEDICARE ANNUAL WELLNESS VISIT: Primary | ICD-10-CM

## 2025-02-26 DIAGNOSIS — K21.9 GASTROESOPHAGEAL REFLUX DISEASE, UNSPECIFIED WHETHER ESOPHAGITIS PRESENT: ICD-10-CM

## 2025-02-26 DIAGNOSIS — E55.9 VITAMIN D DEFICIENCY: ICD-10-CM

## 2025-02-26 DIAGNOSIS — F41.9 ANXIETY: ICD-10-CM

## 2025-02-26 DIAGNOSIS — M51.16 LUMBAR DISC HERNIATION WITH RADICULOPATHY: ICD-10-CM

## 2025-02-26 DIAGNOSIS — E83.42 HYPOMAGNESEMIA: ICD-10-CM

## 2025-02-26 DIAGNOSIS — F51.04 PSYCHOPHYSIOLOGICAL INSOMNIA: ICD-10-CM

## 2025-02-26 DIAGNOSIS — Z12.31 ENCOUNTER FOR SCREENING MAMMOGRAM FOR MALIGNANT NEOPLASM OF BREAST: ICD-10-CM

## 2025-02-26 DIAGNOSIS — Z76.89 ENCOUNTER TO ESTABLISH CARE: ICD-10-CM

## 2025-02-26 DIAGNOSIS — I10 ESSENTIAL HYPERTENSION: ICD-10-CM

## 2025-02-26 DIAGNOSIS — E78.00 PURE HYPERCHOLESTEROLEMIA: ICD-10-CM

## 2025-02-26 DIAGNOSIS — F33.1 MODERATE EPISODE OF RECURRENT MAJOR DEPRESSIVE DISORDER (HCC): ICD-10-CM

## 2025-02-26 DIAGNOSIS — N18.30 CONTROLLED TYPE 2 DIABETES MELLITUS WITH STAGE 3 CHRONIC KIDNEY DISEASE, WITHOUT LONG-TERM CURRENT USE OF INSULIN (HCC): ICD-10-CM

## 2025-02-26 DIAGNOSIS — E61.1 IRON DEFICIENCY: ICD-10-CM

## 2025-02-26 DIAGNOSIS — M51.24 THORACIC DISC HERNIATION: ICD-10-CM

## 2025-02-26 DIAGNOSIS — G47.33 OSA (OBSTRUCTIVE SLEEP APNEA): ICD-10-CM

## 2025-02-26 DIAGNOSIS — Z13.6 SCREENING FOR CARDIOVASCULAR CONDITION: ICD-10-CM

## 2025-02-26 DIAGNOSIS — E11.22 CONTROLLED TYPE 2 DIABETES MELLITUS WITH STAGE 3 CHRONIC KIDNEY DISEASE, WITHOUT LONG-TERM CURRENT USE OF INSULIN (HCC): ICD-10-CM

## 2025-02-26 DIAGNOSIS — I51.89 DIASTOLIC DYSFUNCTION: ICD-10-CM

## 2025-02-26 RX ORDER — HYDROCHLOROTHIAZIDE 12.5 MG/1
12.5 TABLET ORAL DAILY
Qty: 90 TABLET | Refills: 1 | Status: SHIPPED | OUTPATIENT
Start: 2025-02-26 | End: 2026-02-26

## 2025-02-26 RX ORDER — TRAZODONE HYDROCHLORIDE 100 MG/1
100 TABLET ORAL NIGHTLY
Qty: 90 TABLET | Refills: 1 | Status: SHIPPED | OUTPATIENT
Start: 2025-02-26 | End: 2026-02-26

## 2025-02-26 RX ORDER — FERROUS SULFATE 325(65) MG
1 TABLET ORAL 2 TIMES DAILY
Qty: 60 TABLET | Refills: 11 | Status: SHIPPED | OUTPATIENT
Start: 2025-02-26 | End: 2026-02-26

## 2025-02-26 RX ORDER — ATORVASTATIN CALCIUM 80 MG/1
80 TABLET, FILM COATED ORAL DAILY
Qty: 90 TABLET | Refills: 1 | Status: SHIPPED | OUTPATIENT
Start: 2025-02-26

## 2025-02-26 RX ORDER — ESCITALOPRAM OXALATE 5 MG/1
5 TABLET ORAL DAILY
Qty: 30 TABLET | Refills: 1 | Status: SHIPPED | OUTPATIENT
Start: 2025-02-26

## 2025-02-26 RX ORDER — LISINOPRIL 40 MG/1
40 TABLET ORAL DAILY
Qty: 90 TABLET | Refills: 1 | Status: SHIPPED | OUTPATIENT
Start: 2025-02-26 | End: 2026-02-26

## 2025-02-26 RX ORDER — METFORMIN HYDROCHLORIDE 500 MG/1
500 TABLET, EXTENDED RELEASE ORAL
Qty: 90 TABLET | Refills: 1 | Status: SHIPPED | OUTPATIENT
Start: 2025-02-26 | End: 2026-02-26

## 2025-02-26 RX ORDER — PANTOPRAZOLE SODIUM 20 MG/1
20 TABLET, DELAYED RELEASE ORAL
Qty: 90 TABLET | Refills: 1 | Status: SHIPPED | OUTPATIENT
Start: 2025-02-26

## 2025-02-26 RX ORDER — DULOXETIN HYDROCHLORIDE 60 MG/1
CAPSULE, DELAYED RELEASE ORAL
Qty: 180 CAPSULE | Refills: 1 | Status: SHIPPED | OUTPATIENT
Start: 2025-02-26

## 2025-02-26 ASSESSMENT — PATIENT HEALTH QUESTIONNAIRE - PHQ9
SUM OF ALL RESPONSES TO PHQ QUESTIONS 1-9: 14
2. FEELING DOWN, DEPRESSED OR HOPELESS: MORE THAN HALF THE DAYS
4. FEELING TIRED OR HAVING LITTLE ENERGY: MORE THAN HALF THE DAYS
SUM OF ALL RESPONSES TO PHQ QUESTIONS 1-9: 15
10. IF YOU CHECKED OFF ANY PROBLEMS, HOW DIFFICULT HAVE THESE PROBLEMS MADE IT FOR YOU TO DO YOUR WORK, TAKE CARE OF THINGS AT HOME, OR GET ALONG WITH OTHER PEOPLE: SOMEWHAT DIFFICULT
SUM OF ALL RESPONSES TO PHQ9 QUESTIONS 1 & 2: 4
SUM OF ALL RESPONSES TO PHQ QUESTIONS 1-9: 15
8. MOVING OR SPEAKING SO SLOWLY THAT OTHER PEOPLE COULD HAVE NOTICED. OR THE OPPOSITE, BEING SO FIGETY OR RESTLESS THAT YOU HAVE BEEN MOVING AROUND A LOT MORE THAN USUAL: NOT AT ALL
SUM OF ALL RESPONSES TO PHQ QUESTIONS 1-9: 15
1. LITTLE INTEREST OR PLEASURE IN DOING THINGS: MORE THAN HALF THE DAYS
9. THOUGHTS THAT YOU WOULD BE BETTER OFF DEAD, OR OF HURTING YOURSELF: SEVERAL DAYS
5. POOR APPETITE OR OVEREATING: MORE THAN HALF THE DAYS
7. TROUBLE CONCENTRATING ON THINGS, SUCH AS READING THE NEWSPAPER OR WATCHING TELEVISION: MORE THAN HALF THE DAYS
3. TROUBLE FALLING OR STAYING ASLEEP: SEVERAL DAYS
6. FEELING BAD ABOUT YOURSELF - OR THAT YOU ARE A FAILURE OR HAVE LET YOURSELF OR YOUR FAMILY DOWN: NEARLY EVERY DAY

## 2025-02-26 NOTE — PATIENT INSTRUCTIONS
important vitamins and minerals. High-fiber foods include whole-grain cereals and breads, oatmeal, beans, brown rice, citrus fruits, and apples.     Eat lean proteins. Heart-healthy proteins include seafood, lean meats and poultry, eggs, beans, peas, nuts, seeds, and soy products.     Limit drinks and foods with added sugar. These include candy, desserts, and soda pop.   Heart-healthy lifestyle    If your doctor recommends it, get more exercise. For many people, walking is a good choice. Or you may want to swim, bike, or do other activities. Bit by bit, increase the time you're active every day. Try for at least 30 minutes on most days of the week.     Try to quit or cut back on using tobacco and other nicotine products. This includes smoking and vaping. If you need help quitting, talk to your doctor about stop-smoking programs and medicines. These can increase your chances of quitting for good. Quitting is one of the most important things you can do to protect your heart. It is never too late to quit. Try to avoid secondhand smoke too.     Stay at a weight that's healthy for you. Talk to your doctor if you need help losing weight.     Try to get 7 to 9 hours of sleep each night.     Limit alcohol to 2 drinks a day for men and 1 drink a day for women. Too much alcohol can cause health problems.     Manage other health problems such as diabetes, high blood pressure, and high cholesterol. If you think you may have a problem with alcohol or drug use, talk to your doctor.   Medicines    Take your medicines exactly as prescribed. Call your doctor if you think you are having a problem with your medicine.     If your doctor recommends aspirin, take the amount directed each day. Make sure you take aspirin and not another kind of pain reliever, such as acetaminophen (Tylenol).   When should you call for help?   Call 911 if you have symptoms of a heart attack. These may include:    Chest pain or pressure, or a strange feeling

## 2025-02-26 NOTE — PROGRESS NOTES
Medicare Annual Wellness Visit    Nubia Jenkins is here for Medicare AWV, Established New Doctor (Previous PCP JORDAN Barillas ), Cough (Onset 4 months. Mucinex with worsening stomach pain.), and Gastroesophageal Reflux (Taking omeprazole. Worse after eating.)    Assessment & Plan   Initial Medicare annual wellness visit  Encounter to establish care  -     CBC; Future  -     Comprehensive Metabolic Panel, Fasting; Future  -     Lipid Panel; Future  -     TSH reflex to FT4; Future  -     Hemoglobin A1C; Future  -     Albumin/Creatinine Ratio, Urine; Future  -     Vitamin D 25 Hydroxy; Future  Essential hypertension  -     CBC; Future  -     Comprehensive Metabolic Panel, Fasting; Future  -     Lipid Panel; Future  -     TSH reflex to FT4; Future  -     Hemoglobin A1C; Future  -     lisinopril (PRINIVIL;ZESTRIL) 40 MG tablet; Take 1 tablet by mouth daily, Disp-90 tablet, R-1Normal  -     hydroCHLOROthiazide 12.5 MG tablet; Take 1 tablet by mouth daily, Disp-90 tablet, R-1Normal  ARNIE (obstructive sleep apnea)  Gastroesophageal reflux disease, unspecified whether esophagitis present  -     pantoprazole (PROTONIX) 20 MG tablet; Take 1 tablet by mouth every morning (before breakfast), Disp-90 tablet, R-1Normal  Controlled type 2 diabetes mellitus with stage 3 chronic kidney disease, without long-term current use of insulin (HCC)  -     Hemoglobin A1C; Future  -     Albumin/Creatinine Ratio, Urine; Future  -     metFORMIN (GLUCOPHAGE-XR) 500 MG extended release tablet; Take 1 tablet by mouth daily (with breakfast), Disp-90 tablet, R-1Normal  Lumbar disc herniation with radiculopathy  Thoracic disc herniation  Pure hypercholesterolemia  -     atorvastatin (LIPITOR) 80 MG tablet; Take 1 tablet by mouth daily, Disp-90 tablet, R-1Normal  Anxiety  -     escitalopram (LEXAPRO) 5 MG tablet; Take 1 tablet by mouth daily, Disp-30 tablet, R-1Normal  Moderate episode of recurrent major depressive disorder (HCC)  -     traZODone

## 2025-02-28 ENCOUNTER — HOSPITAL ENCOUNTER (OUTPATIENT)
Age: 65
Setting detail: SPECIMEN
Discharge: HOME | End: 2025-02-28

## 2025-02-28 DIAGNOSIS — I10 ESSENTIAL HYPERTENSION: ICD-10-CM

## 2025-02-28 DIAGNOSIS — N18.30 CONTROLLED TYPE 2 DIABETES MELLITUS WITH STAGE 3 CHRONIC KIDNEY DISEASE, WITHOUT LONG-TERM CURRENT USE OF INSULIN (HCC): ICD-10-CM

## 2025-02-28 DIAGNOSIS — Z13.6 SCREENING FOR CARDIOVASCULAR CONDITION: ICD-10-CM

## 2025-02-28 DIAGNOSIS — E55.9 VITAMIN D DEFICIENCY: ICD-10-CM

## 2025-02-28 DIAGNOSIS — E11.22 CONTROLLED TYPE 2 DIABETES MELLITUS WITH STAGE 3 CHRONIC KIDNEY DISEASE, WITHOUT LONG-TERM CURRENT USE OF INSULIN (HCC): ICD-10-CM

## 2025-02-28 DIAGNOSIS — Z76.89 ENCOUNTER TO ESTABLISH CARE: ICD-10-CM

## 2025-02-28 LAB
25(OH)D3 SERPL-MCNC: 33.4 NG/ML (ref 30–100)
ALBUMIN SERPL-MCNC: 4.3 G/DL (ref 3.5–5.2)
ALBUMIN/GLOB SERPL: 1.9 {RATIO} (ref 1–2.5)
ALP SERPL-CCNC: 108 U/L (ref 35–104)
ALT SERPL-CCNC: 19 U/L (ref 10–35)
ANION GAP SERPL CALCULATED.3IONS-SCNC: 12 MMOL/L (ref 9–16)
AST SERPL-CCNC: 19 U/L (ref 10–35)
BILIRUB SERPL-MCNC: 0.5 MG/DL (ref 0–1.2)
BUN SERPL-MCNC: 14 MG/DL (ref 8–23)
CALCIUM SERPL-MCNC: 9.8 MG/DL (ref 8.6–10.4)
CHLORIDE SERPL-SCNC: 103 MMOL/L (ref 98–107)
CHOLEST SERPL-MCNC: 155 MG/DL (ref 0–199)
CHOLESTEROL/HDL RATIO: 4.1
CO2 SERPL-SCNC: 27 MMOL/L (ref 20–31)
CREAT SERPL-MCNC: 0.9 MG/DL (ref 0.6–0.9)
CREAT UR-MCNC: 236 MG/DL (ref 28–217)
ERYTHROCYTE [DISTWIDTH] IN BLOOD BY AUTOMATED COUNT: 12.8 % (ref 11.8–14.4)
EST. AVERAGE GLUCOSE BLD GHB EST-MCNC: 137 MG/DL
GFR, ESTIMATED: 71 ML/MIN/1.73M2
GLUCOSE P FAST SERPL-MCNC: 131 MG/DL (ref 74–99)
HBA1C MFR BLD: 6.4 % (ref 4–6)
HCT VFR BLD AUTO: 39.9 % (ref 36.3–47.1)
HDLC SERPL-MCNC: 38 MG/DL
HGB BLD-MCNC: 13 G/DL (ref 11.9–15.1)
LDLC SERPL CALC-MCNC: 82 MG/DL (ref 0–100)
MCH RBC QN AUTO: 28.8 PG (ref 25.2–33.5)
MCHC RBC AUTO-ENTMCNC: 32.6 G/DL (ref 28.4–34.8)
MCV RBC AUTO: 88.3 FL (ref 82.6–102.9)
MICROALBUMIN UR-MCNC: <12 MG/L (ref 0–20)
MICROALBUMIN/CREAT UR-RTO: ABNORMAL MCG/MG CREAT (ref 0–25)
NRBC BLD-RTO: 0 PER 100 WBC
PLATELET # BLD AUTO: 345 K/UL (ref 138–453)
PMV BLD AUTO: 10.5 FL (ref 8.1–13.5)
POTASSIUM SERPL-SCNC: 3.7 MMOL/L (ref 3.7–5.3)
PROT SERPL-MCNC: 6.6 G/DL (ref 6.6–8.7)
RBC # BLD AUTO: 4.52 M/UL (ref 3.95–5.11)
SODIUM SERPL-SCNC: 142 MMOL/L (ref 136–145)
TRIGL SERPL-MCNC: 173 MG/DL
TSH SERPL DL<=0.05 MIU/L-ACNC: 1.78 UIU/ML (ref 0.27–4.2)
VLDLC SERPL CALC-MCNC: 35 MG/DL (ref 1–30)
WBC OTHER # BLD: 7.6 K/UL (ref 3.5–11.3)

## 2025-03-05 ENCOUNTER — HOSPITAL ENCOUNTER (OUTPATIENT)
Age: 65
Discharge: HOME | End: 2025-03-07
Payer: MEDICARE

## 2025-03-05 VITALS — HEIGHT: 63 IN | BODY MASS INDEX: 28.35 KG/M2 | WEIGHT: 160 LBS

## 2025-03-05 DIAGNOSIS — Z12.31 ENCOUNTER FOR SCREENING MAMMOGRAM FOR MALIGNANT NEOPLASM OF BREAST: ICD-10-CM

## 2025-03-05 PROCEDURE — 77067 SCR MAMMO BI INCL CAD: CPT

## 2025-03-19 NOTE — PROGRESS NOTES
Nubia Jenkins is a 64 y.o. female who presents in office today with Self medication specific follow up    Chief Complaint   Patient presents with    Anxiety    Depression    Insomnia        History of Present Illness:     HPI    Patient here today for medication follow-up. Going out of town with daughter this weekend.    At her last visit we added Lexapro for anxiety and depression, she was also previously prescribed Cymbalta and trazodone. Feels this dose is working well, denies concerns.     We also switched her Prilosec due to Protonix, due to exacerbated GERD symptoms. Feeling much better, doesn't seem to bother her quite as much, worse when she tomatoes.     Saw neurosurgery for her 1 year postop appointment, who cleared her unless she has further symptoms or concerns.     Care gaps:   Colon CA screening: Colonoscopy November 2024  Vaccines:   Hepatitis C/HIV screens: Hep C nonreactive  Breast CA screening: Completed March 2025  OB/GYN, cervical CA screening: N/A  Depression Screening:     Health Maintenance Due   Topic Date Due    Pneumococcal 50+ years Vaccine (2 of 2 - PCV) 08/24/2010    Diabetic retinal exam  05/26/2022    Diabetic foot exam  02/16/2024        Patient Care Team:  Eleni Jhaveri APRN - CNP as PCP - General (Family Medicine)  Eleni Jhaveri APRN - CNP as PCP - Empaneled Provider  Laxmi Vigil MD as Physician (Dermatology)  Earline Lopez MD as Physician (Gynecology)  Therese Donato, PhD (Psychology)    Reviewed     [x] Past Medical, Family, and Social History was reviewed per writer and does contribute to the patient presenting condition.    [x] Laboratory Results, Vital signs, Imaging, Active Problems, Immunizations, Current/Recently Discontinued Medications, Health Maintenance Activities Due, Referral Notes (if available) were reviewed per writer     [x] Reviewed Depression screening if taken or valid today or any other valid screening tool (others seen below)

## 2025-04-07 SDOH — ECONOMIC STABILITY: FOOD INSECURITY: WITHIN THE PAST 12 MONTHS, YOU WORRIED THAT YOUR FOOD WOULD RUN OUT BEFORE YOU GOT MONEY TO BUY MORE.: NEVER TRUE

## 2025-04-07 SDOH — ECONOMIC STABILITY: FOOD INSECURITY: WITHIN THE PAST 12 MONTHS, THE FOOD YOU BOUGHT JUST DIDN'T LAST AND YOU DIDN'T HAVE MONEY TO GET MORE.: NEVER TRUE

## 2025-04-07 SDOH — ECONOMIC STABILITY: INCOME INSECURITY: IN THE LAST 12 MONTHS, WAS THERE A TIME WHEN YOU WERE NOT ABLE TO PAY THE MORTGAGE OR RENT ON TIME?: NO

## 2025-04-10 ENCOUNTER — OFFICE VISIT (OUTPATIENT)
Dept: FAMILY MEDICINE CLINIC | Age: 65
End: 2025-04-10
Payer: MEDICARE

## 2025-04-10 VITALS
WEIGHT: 165.6 LBS | BODY MASS INDEX: 29.33 KG/M2 | TEMPERATURE: 96.9 F | SYSTOLIC BLOOD PRESSURE: 104 MMHG | OXYGEN SATURATION: 97 % | DIASTOLIC BLOOD PRESSURE: 84 MMHG | HEART RATE: 75 BPM

## 2025-04-10 DIAGNOSIS — E78.00 PURE HYPERCHOLESTEROLEMIA: ICD-10-CM

## 2025-04-10 DIAGNOSIS — F33.1 MODERATE EPISODE OF RECURRENT MAJOR DEPRESSIVE DISORDER (HCC): Primary | ICD-10-CM

## 2025-04-10 DIAGNOSIS — K21.9 GASTROESOPHAGEAL REFLUX DISEASE, UNSPECIFIED WHETHER ESOPHAGITIS PRESENT: ICD-10-CM

## 2025-04-10 DIAGNOSIS — F51.04 PSYCHOPHYSIOLOGICAL INSOMNIA: ICD-10-CM

## 2025-04-10 DIAGNOSIS — F41.9 ANXIETY: ICD-10-CM

## 2025-04-10 PROCEDURE — 99213 OFFICE O/P EST LOW 20 MIN: CPT

## 2025-04-10 PROCEDURE — 1160F RVW MEDS BY RX/DR IN RCRD: CPT

## 2025-04-10 PROCEDURE — 3017F COLORECTAL CA SCREEN DOC REV: CPT

## 2025-04-10 PROCEDURE — 3079F DIAST BP 80-89 MM HG: CPT

## 2025-04-10 PROCEDURE — 1036F TOBACCO NON-USER: CPT

## 2025-04-10 PROCEDURE — 3074F SYST BP LT 130 MM HG: CPT

## 2025-04-10 PROCEDURE — G8400 PT W/DXA NO RESULTS DOC: HCPCS

## 2025-04-10 PROCEDURE — G8427 DOCREV CUR MEDS BY ELIG CLIN: HCPCS

## 2025-04-10 PROCEDURE — 1090F PRES/ABSN URINE INCON ASSESS: CPT

## 2025-04-10 PROCEDURE — G8417 CALC BMI ABV UP PARAM F/U: HCPCS

## 2025-04-10 PROCEDURE — 1159F MED LIST DOCD IN RCRD: CPT

## 2025-04-10 PROCEDURE — 1123F ACP DISCUSS/DSCN MKR DOCD: CPT

## 2025-04-10 RX ORDER — ATORVASTATIN CALCIUM 80 MG/1
80 TABLET, FILM COATED ORAL DAILY
Qty: 90 TABLET | Refills: 1 | Status: SHIPPED | OUTPATIENT
Start: 2025-04-10

## 2025-04-17 DIAGNOSIS — F41.9 ANXIETY: ICD-10-CM

## 2025-04-17 DIAGNOSIS — F33.1 MODERATE EPISODE OF RECURRENT MAJOR DEPRESSIVE DISORDER (HCC): ICD-10-CM

## 2025-04-18 NOTE — TELEPHONE ENCOUNTER
LOV 4/10/25  RTO 1 year; F/U scheduled  LRF 2/26/25    Health Maintenance   Topic Date Due    Pneumococcal 50+ years Vaccine (2 of 2 - PCV) 08/24/2010    Diabetic retinal exam  05/26/2022    Diabetic foot exam  02/16/2024    Flu vaccine (Season Ended) 02/26/2026 (Originally 8/1/2025)    COVID-19 Vaccine (2 - 2024-25 season) 02/26/2026 (Originally 9/1/2024)    Depression Monitoring  02/26/2026    Annual Wellness Visit (Medicare)  02/27/2026    A1C test (Diabetic or Prediabetic)  02/28/2026    Diabetic Alb to Cr ratio (uACR) test  02/28/2026    Lipids  02/28/2026    GFR test (Diabetes, CKD 3-4, OR last GFR 15-59)  02/28/2026    Breast cancer screen  03/05/2027    DTaP/Tdap/Td vaccine (3 - Td or Tdap) 09/13/2028    Respiratory Syncytial Virus (RSV) Pregnant or age 60 yrs+ (1 - 1-dose 75+ series) 06/25/2033    Colorectal Cancer Screen  11/06/2034    DEXA (modify frequency per FRAX score)  Completed    Shingles vaccine  Completed    Hepatitis C screen  Completed    Hepatitis A vaccine  Aged Out    Hepatitis B vaccine  Aged Out    Hib vaccine  Aged Out    Polio vaccine  Aged Out    Meningococcal (ACWY) vaccine  Aged Out    Meningococcal B vaccine  Aged Out    Pneumococcal 0-49 years Vaccine  Discontinued             (applicable per patient's age: Cancer Screenings, Depression Screening, Fall Risk Screening, Immunizations)    Hemoglobin A1C (%)   Date Value   02/28/2025 6.4 (H)   04/09/2024 5.8   07/10/2023 6.1 (A)     AST (U/L)   Date Value   02/28/2025 19     ALT (U/L)   Date Value   02/28/2025 19     BUN (mg/dL)   Date Value   02/28/2025 14      (goal A1C is < 7)   (goal LDL is <100) need 30-50% reduction from baseline     BP Readings from Last 3 Encounters:   04/10/25 104/84   02/26/25 100/64   11/06/24 126/76    (goal /80)      All Future Testing planned in [facility]:  Lab Frequency Next Occurrence   CT CHEST WO CONTRAST Once 06/12/2025       Next Visit Date:  Future Appointments   Date Time Provider Department

## 2025-04-19 RX ORDER — ESCITALOPRAM OXALATE 5 MG/1
5 TABLET ORAL DAILY
Qty: 30 TABLET | Refills: 1 | Status: SHIPPED | OUTPATIENT
Start: 2025-04-19

## 2025-06-19 DIAGNOSIS — F41.9 ANXIETY: ICD-10-CM

## 2025-06-19 DIAGNOSIS — F33.1 MODERATE EPISODE OF RECURRENT MAJOR DEPRESSIVE DISORDER (HCC): ICD-10-CM

## 2025-06-19 NOTE — TELEPHONE ENCOUNTER
LOV 4/10/25   RTO 3/2/26  LRF 4/19/25          Controlled Substance Monitoring:    Acute and Chronic Pain Monitoring:   RX Monitoring Attestation Periodic Controlled Substance Monitoring   8/7/2018   5:01 PM The Prescription Monitoring Report for this patient was reviewed today. No signs of potential drug abuse or diversion identified.

## 2025-06-20 RX ORDER — ESCITALOPRAM OXALATE 5 MG/1
5 TABLET ORAL DAILY
Qty: 90 TABLET | Refills: 1 | Status: SHIPPED | OUTPATIENT
Start: 2025-06-20

## 2025-06-23 DIAGNOSIS — I71.21 ASCENDING AORTIC ANEURYSM, UNSPECIFIED WHETHER RUPTURED: Primary | ICD-10-CM

## 2025-07-02 ENCOUNTER — HOSPITAL ENCOUNTER (OUTPATIENT)
Dept: CT IMAGING | Age: 65
Discharge: HOME | End: 2025-07-04
Payer: MEDICARE

## 2025-07-02 DIAGNOSIS — I71.21 ASCENDING AORTIC ANEURYSM, UNSPECIFIED WHETHER RUPTURED: ICD-10-CM

## 2025-07-02 PROCEDURE — 71250 CT THORAX DX C-: CPT

## 2025-08-11 ENCOUNTER — OFFICE VISIT (OUTPATIENT)
Dept: PRIMARY CARE CLINIC | Age: 65
End: 2025-08-11
Payer: MEDICARE

## 2025-08-11 VITALS
TEMPERATURE: 97.1 F | BODY MASS INDEX: 28.17 KG/M2 | OXYGEN SATURATION: 98 % | WEIGHT: 159 LBS | HEART RATE: 76 BPM | DIASTOLIC BLOOD PRESSURE: 72 MMHG | SYSTOLIC BLOOD PRESSURE: 100 MMHG

## 2025-08-11 DIAGNOSIS — B02.9 HERPES ZOSTER WITHOUT COMPLICATION: Primary | ICD-10-CM

## 2025-08-11 DIAGNOSIS — R21 RASH: ICD-10-CM

## 2025-08-11 DIAGNOSIS — L03.113 CELLULITIS OF RIGHT UPPER EXTREMITY: ICD-10-CM

## 2025-08-11 PROCEDURE — 3017F COLORECTAL CA SCREEN DOC REV: CPT

## 2025-08-11 PROCEDURE — 3078F DIAST BP <80 MM HG: CPT

## 2025-08-11 PROCEDURE — 1036F TOBACCO NON-USER: CPT

## 2025-08-11 PROCEDURE — G8427 DOCREV CUR MEDS BY ELIG CLIN: HCPCS

## 2025-08-11 PROCEDURE — G8417 CALC BMI ABV UP PARAM F/U: HCPCS

## 2025-08-11 PROCEDURE — 1090F PRES/ABSN URINE INCON ASSESS: CPT

## 2025-08-11 PROCEDURE — 3074F SYST BP LT 130 MM HG: CPT

## 2025-08-11 PROCEDURE — 99214 OFFICE O/P EST MOD 30 MIN: CPT

## 2025-08-11 PROCEDURE — G8400 PT W/DXA NO RESULTS DOC: HCPCS

## 2025-08-11 PROCEDURE — 1123F ACP DISCUSS/DSCN MKR DOCD: CPT

## 2025-08-11 RX ORDER — CLOBETASOL PROPIONATE 0.5 MG/G
OINTMENT TOPICAL
Qty: 45 EACH | Refills: 0 | Status: SHIPPED | OUTPATIENT
Start: 2025-08-11

## 2025-08-11 RX ORDER — CEPHALEXIN 500 MG/1
500 CAPSULE ORAL 3 TIMES DAILY
Qty: 15 CAPSULE | Refills: 0 | Status: SHIPPED | OUTPATIENT
Start: 2025-08-11 | End: 2025-08-16

## 2025-08-11 RX ORDER — VALACYCLOVIR HYDROCHLORIDE 1 G/1
1000 TABLET, FILM COATED ORAL 3 TIMES DAILY
Qty: 30 TABLET | Refills: 0 | Status: SHIPPED | OUTPATIENT
Start: 2025-08-11 | End: 2025-08-21

## 2025-08-11 ASSESSMENT — ENCOUNTER SYMPTOMS
COUGH: 0
SORE THROAT: 0

## 2025-08-12 ASSESSMENT — ENCOUNTER SYMPTOMS: COLOR CHANGE: 1

## 2025-08-22 DIAGNOSIS — E11.22 CONTROLLED TYPE 2 DIABETES MELLITUS WITH STAGE 3 CHRONIC KIDNEY DISEASE, WITHOUT LONG-TERM CURRENT USE OF INSULIN (HCC): ICD-10-CM

## 2025-08-22 DIAGNOSIS — N18.30 CONTROLLED TYPE 2 DIABETES MELLITUS WITH STAGE 3 CHRONIC KIDNEY DISEASE, WITHOUT LONG-TERM CURRENT USE OF INSULIN (HCC): ICD-10-CM

## 2025-08-22 DIAGNOSIS — K21.9 GASTROESOPHAGEAL REFLUX DISEASE, UNSPECIFIED WHETHER ESOPHAGITIS PRESENT: ICD-10-CM

## 2025-08-22 RX ORDER — METFORMIN HYDROCHLORIDE 500 MG/1
500 TABLET, EXTENDED RELEASE ORAL DAILY
Qty: 90 TABLET | Refills: 1 | Status: SHIPPED | OUTPATIENT
Start: 2025-08-22

## 2025-08-22 RX ORDER — PANTOPRAZOLE SODIUM 20 MG/1
20 TABLET, DELAYED RELEASE ORAL
Qty: 90 TABLET | Refills: 1 | Status: SHIPPED | OUTPATIENT
Start: 2025-08-22

## (undated) DEVICE — SNARE ENDOSCP AD L240CM LOOP W10MM SHTH DIA2.4MM RND INSUL

## (undated) DEVICE — POLYP TRAP: Brand: TRAPEASE®

## (undated) DEVICE — ADAPTER CLEANING PORPOISE CLEANING

## (undated) DEVICE — Device: Brand: DEFENDO VALVE AND CONNECTOR KIT

## (undated) DEVICE — PERRYSBURG ENDO PACK: Brand: MEDLINE INDUSTRIES, INC.